# Patient Record
Sex: FEMALE | Race: BLACK OR AFRICAN AMERICAN | HISPANIC OR LATINO | Employment: OTHER | URBAN - METROPOLITAN AREA
[De-identification: names, ages, dates, MRNs, and addresses within clinical notes are randomized per-mention and may not be internally consistent; named-entity substitution may affect disease eponyms.]

---

## 2018-06-09 ENCOUNTER — HOSPITAL ENCOUNTER (EMERGENCY)
Facility: OTHER | Age: 43
Discharge: HOME | End: 2018-06-09
Attending: EMERGENCY MEDICINE
Payer: MEDICARE

## 2018-06-09 VITALS
BODY MASS INDEX: 38.89 KG/M2 | WEIGHT: 242 LBS | RESPIRATION RATE: 17 BRPM | TEMPERATURE: 98 F | DIASTOLIC BLOOD PRESSURE: 83 MMHG | HEIGHT: 66 IN | HEART RATE: 92 BPM | SYSTOLIC BLOOD PRESSURE: 151 MMHG | OXYGEN SATURATION: 97 %

## 2018-06-09 DIAGNOSIS — F41.9 ANXIETY: ICD-10-CM

## 2018-06-09 DIAGNOSIS — J45.901 EXACERBATION OF ASTHMA, UNSPECIFIED ASTHMA SEVERITY, UNSPECIFIED WHETHER PERSISTENT: Primary | ICD-10-CM

## 2018-06-09 DIAGNOSIS — R05.9 COUGH: ICD-10-CM

## 2018-06-09 PROCEDURE — 99284 EMERGENCY DEPT VISIT MOD MDM: CPT | Mod: 25

## 2018-06-09 PROCEDURE — 25000003 PHARM REV CODE 250: Performed by: EMERGENCY MEDICINE

## 2018-06-09 PROCEDURE — 25000242 PHARM REV CODE 250 ALT 637 W/ HCPCS: Performed by: EMERGENCY MEDICINE

## 2018-06-09 PROCEDURE — 63600175 PHARM REV CODE 636 W HCPCS: Performed by: EMERGENCY MEDICINE

## 2018-06-09 PROCEDURE — 94640 AIRWAY INHALATION TREATMENT: CPT

## 2018-06-09 PROCEDURE — 96372 THER/PROPH/DIAG INJ SC/IM: CPT

## 2018-06-09 RX ORDER — METHYLPREDNISOLONE SOD SUCC 125 MG
125 VIAL (EA) INJECTION
Status: DISCONTINUED | OUTPATIENT
Start: 2018-06-09 | End: 2018-06-09

## 2018-06-09 RX ORDER — IPRATROPIUM BROMIDE AND ALBUTEROL SULFATE 2.5; .5 MG/3ML; MG/3ML
3 SOLUTION RESPIRATORY (INHALATION)
Status: COMPLETED | OUTPATIENT
Start: 2018-06-09 | End: 2018-06-09

## 2018-06-09 RX ORDER — PREDNISONE 20 MG/1
40 TABLET ORAL DAILY
Qty: 10 TABLET | Refills: 0 | Status: SHIPPED | OUTPATIENT
Start: 2018-06-09 | End: 2018-06-14

## 2018-06-09 RX ORDER — LORAZEPAM 1 MG/1
1 TABLET ORAL
Status: COMPLETED | OUTPATIENT
Start: 2018-06-09 | End: 2018-06-09

## 2018-06-09 RX ORDER — METHYLPREDNISOLONE SOD SUCC 125 MG
125 VIAL (EA) INJECTION
Status: COMPLETED | OUTPATIENT
Start: 2018-06-09 | End: 2018-06-09

## 2018-06-09 RX ADMIN — IPRATROPIUM BROMIDE AND ALBUTEROL SULFATE 3 ML: .5; 3 SOLUTION RESPIRATORY (INHALATION) at 09:06

## 2018-06-09 RX ADMIN — METHYLPREDNISOLONE SODIUM SUCCINATE 125 MG: 125 INJECTION, POWDER, FOR SOLUTION INTRAMUSCULAR; INTRAVENOUS at 10:06

## 2018-06-09 RX ADMIN — LORAZEPAM 1 MG: 1 TABLET ORAL at 10:06

## 2018-06-10 NOTE — ED PROVIDER NOTES
"Encounter Date: 6/9/2018    SCRIBE #1 NOTE: I, Sandra Crawford, am scribing for, and in the presence of, Dr. Srinivasan.       History     Chief Complaint   Patient presents with    Asthma     Pt CO asthma attack Xs 3 days, worsening tonight.      Time seen by provider: 9:52 PM    This is a 42 y.o. Female, with history of asthma, HTN, and DM, who presents with complaint of shortness of breath that began three days ago. SOB worsened one hour ago. Pt reports productive cough and wheezing. She also reports "foaming from the mouth while trying to breath" prior to arrival. She reports using a nebulizer machine and inhaler with no improvement. Pt last used steroids approximately one year ago, and is compliant with medication. She denies prior intubation. She denies use of tobacco, but is exposed to second hand smoke. Pt denies fever, chills, congestion, rhinorrhea, nausea, vomiting, or leg swelling.      The history is provided by the patient.     Review of patient's allergies indicates:  No Known Allergies  Past Medical History:   Diagnosis Date    Anxiety     Diabetes mellitus     Hypertension      Past Surgical History:   Procedure Laterality Date    HYSTERECTOMY       No family history on file.  Social History   Substance Use Topics    Smoking status: Never Smoker    Smokeless tobacco: Not on file    Alcohol use Yes      Comment: social     Review of Systems   Constitutional: Negative for activity change, appetite change, chills, diaphoresis and fever.   HENT: Negative for congestion, rhinorrhea, sore throat and trouble swallowing.    Eyes: Negative for photophobia and visual disturbance.   Respiratory: Positive for cough, shortness of breath and wheezing. Negative for chest tightness.         Positive for dyspnea.    Cardiovascular: Negative for chest pain and leg swelling.   Gastrointestinal: Negative for abdominal pain, nausea and vomiting.   Endocrine: Negative for polydipsia and polyuria.   Genitourinary: " Negative for difficulty urinating and flank pain.   Musculoskeletal: Negative for back pain and neck pain.   Skin: Negative for rash.   Neurological: Negative for weakness and headaches.   Psychiatric/Behavioral: Negative for confusion.       Physical Exam     Initial Vitals [06/09/18 2146]   BP Pulse Resp Temp SpO2   (!) 155/102 98 (!) 28 97.9 °F (36.6 °C) 99 %      MAP       119.67         Physical Exam    Nursing note and vitals reviewed.  Constitutional: She appears well-developed and well-nourished. She is not diaphoretic.   Anxious appearing. Tearful. Obese.    HENT:   Head: Normocephalic and atraumatic.   Right Ear: External ear normal.   Left Ear: External ear normal.   Nose: Nose normal.   Mouth/Throat: Oropharynx is clear and moist.   Eyes: Conjunctivae and EOM are normal. Pupils are equal, round, and reactive to light. No scleral icterus.   Neck: Normal range of motion. Neck supple. No JVD present.   Cardiovascular: Normal rate, regular rhythm, normal heart sounds and intact distal pulses. Exam reveals no gallop and no friction rub.    No murmur heard.  Pulmonary/Chest: No stridor.   Speaking full sentences. Intermittent hyperventilation. Diminished air entry to bilateral bases.   Abdominal: Soft.   Musculoskeletal: Normal range of motion. She exhibits no edema or tenderness.   Neurological: She is alert and oriented to person, place, and time. She has normal strength. No cranial nerve deficit.   Skin: Skin is warm and dry. Capillary refill takes less than 2 seconds. No rash noted. No pallor.   Psychiatric: She has a normal mood and affect. Her behavior is normal. Judgment and thought content normal.         ED Course   Procedures  Labs Reviewed - No data to display       X-Ray Chest 1 View   Final Result      No acute findings in the chest.         Electronically signed by: Bashir Landa MD   Date:    06/09/2018   Time:    22:59        Imaging Results          X-Ray Chest 1 View (Final result)  Result  time 06/09/18 22:59:43    Final result by Bashir Landa MD (06/09/18 22:59:43)                 Impression:      No acute findings in the chest.      Electronically signed by: Bashir Landa MD  Date:    06/09/2018  Time:    22:59             Narrative:    EXAMINATION:  XR CHEST 1 VIEW    CLINICAL HISTORY:  Cough    TECHNIQUE:  Single frontal view of the chest was performed.    COMPARISON:  March 27, 2015.    FINDINGS:  No consolidation, pleural effusion or pneumothorax.    Cardiomediastinal silhouette is unremarkable.                                X-Rays:   Independently Interpreted Readings:   Chest X-Ray: Normal heart size.  No infiltrates.  No acute abnormalities.     Medical Decision Making:   Initial Assessment:   Urgent evaluation a 42-year-old female with asthma here 3 days of worsening shortness of breath, wheezing unimproved with home inhaler treatments, sensation of throat closing this afternoon.  Patient denies fevers, has a history of anxiety, on exam is able to speak full sentences, but intermittently hyperventilating, tearful.  Suspect anxiety component given lack of conversational dyspnea, w acute URI.  Will administer steroids, nebulizers, and reasses.  Clinical Tests:   Radiological Study: Ordered and Reviewed  ED Management:  Patient feeling improved, and calmed after medications, chest x-ray without infiltrate, vital signs without tachycardia or hypoxia.            Scribe Attestation:   Scribe #1: I performed the above scribed service and the documentation accurately describes the services I performed. I attest to the accuracy of the note.    Attending Attestation:           Physician Attestation for Scribe:  Physician Attestation Statement for Scribe #1: I, Dr. Srinivasan, reviewed documentation, as scribed by Sandra Crawford in my presence, and it is both accurate and complete.                    Clinical Impression:     1. Exacerbation of asthma, unspecified asthma severity, unspecified  whether persistent    2. Cough    3. Anxiety            Disposition:   Disposition: Discharged  Condition: Stable                        Odalys Srinivasan MD  06/10/18 0231       Odalys Srinivasan MD  06/10/18 0624

## 2018-06-10 NOTE — ED NOTES
Pt states that she has had a cough and been short of breath for 3 days. She has used her inhaler and nebulizer without relief.  LOC: Pt is awake alert and aware of environment, oriented X3 and speaking in complete sentences  Appearance: Pt is in no acute distress, Pt is well groomed and clean, pt is tearful unsure why  Skin: skin is warm and dry with normal turgor, mucus membranes are moist and pink, skin is intact with no bruising or breakdown  Muskuloskeletal: Pt moves all extremities well, there is no obvious swelling or deformities noted, pulses are intact.  Respiratory: Airway is open and patent, respirations are spontaneous and even, wheezes noted bilateraly, + cough, pt states productive   Cardiac: normal rate, no edema and cap refill is <3sec  Neuro: Pt follows commands easily and has no obvious deficits

## 2018-06-10 NOTE — ED NOTES
Pt laying in bed with HOB elevated, resting with eyes close, breathing unlabored. X-ray at bedside. Denies any discomfort at this time. Will continue to monitor.

## 2018-07-02 DIAGNOSIS — J30.1 ALLERGIC RHINITIS DUE TO POLLEN: Primary | ICD-10-CM

## 2018-07-02 DIAGNOSIS — J45.40 MODERATE PERSISTENT ASTHMA: ICD-10-CM

## 2018-07-02 DIAGNOSIS — R31.29 HEMATURIA, MICROSCOPIC: ICD-10-CM

## 2018-07-31 ENCOUNTER — OFFICE VISIT (OUTPATIENT)
Dept: PULMONOLOGY | Facility: CLINIC | Age: 43
End: 2018-07-31
Payer: MEDICARE

## 2018-07-31 ENCOUNTER — LAB VISIT (OUTPATIENT)
Dept: LAB | Facility: HOSPITAL | Age: 43
End: 2018-07-31
Attending: INTERNAL MEDICINE
Payer: MEDICARE

## 2018-07-31 ENCOUNTER — HOSPITAL ENCOUNTER (OUTPATIENT)
Dept: PULMONOLOGY | Facility: CLINIC | Age: 43
Discharge: HOME | End: 2018-07-31
Payer: MEDICARE

## 2018-07-31 VITALS
BODY MASS INDEX: 40.02 KG/M2 | OXYGEN SATURATION: 99 % | HEART RATE: 66 BPM | SYSTOLIC BLOOD PRESSURE: 136 MMHG | WEIGHT: 249 LBS | DIASTOLIC BLOOD PRESSURE: 88 MMHG | HEIGHT: 66 IN

## 2018-07-31 DIAGNOSIS — R06.02 SOB (SHORTNESS OF BREATH): Primary | ICD-10-CM

## 2018-07-31 DIAGNOSIS — R06.02 SOB (SHORTNESS OF BREATH): ICD-10-CM

## 2018-07-31 DIAGNOSIS — R31.29 HEMATURIA, MICROSCOPIC: ICD-10-CM

## 2018-07-31 DIAGNOSIS — R53.83 FATIGUE, UNSPECIFIED TYPE: ICD-10-CM

## 2018-07-31 DIAGNOSIS — D64.9 ANEMIA, UNSPECIFIED TYPE: ICD-10-CM

## 2018-07-31 DIAGNOSIS — R06.9 UNSPECIFIED ABNORMALITIES OF BREATHING: ICD-10-CM

## 2018-07-31 DIAGNOSIS — J45.40 MODERATE PERSISTENT ASTHMA: ICD-10-CM

## 2018-07-31 DIAGNOSIS — E66.2 MORBID (SEVERE) OBESITY WITH ALVEOLAR HYPOVENTILATION: ICD-10-CM

## 2018-07-31 DIAGNOSIS — J30.1 ALLERGIC RHINITIS DUE TO POLLEN: ICD-10-CM

## 2018-07-31 LAB
ALBUMIN SERPL BCP-MCNC: 3.5 G/DL
ALP SERPL-CCNC: 85 U/L
ALT SERPL W/O P-5'-P-CCNC: 13 U/L
ANION GAP SERPL CALC-SCNC: 8 MMOL/L
AST SERPL-CCNC: 14 U/L
BASOPHILS # BLD AUTO: 0.02 K/UL
BASOPHILS NFR BLD: 0.3 %
BILIRUB SERPL-MCNC: 0.3 MG/DL
BNP SERPL-MCNC: 19 PG/ML
BUN SERPL-MCNC: 7 MG/DL
CALCIUM SERPL-MCNC: 9.3 MG/DL
CHLORIDE SERPL-SCNC: 102 MMOL/L
CO2 SERPL-SCNC: 25 MMOL/L
CREAT SERPL-MCNC: 0.7 MG/DL
DIFFERENTIAL METHOD: ABNORMAL
EOSINOPHIL # BLD AUTO: 0.4 K/UL
EOSINOPHIL NFR BLD: 6 %
ERYTHROCYTE [DISTWIDTH] IN BLOOD BY AUTOMATED COUNT: 15.1 %
EST. GFR  (AFRICAN AMERICAN): >60 ML/MIN/1.73 M^2
EST. GFR  (NON AFRICAN AMERICAN): >60 ML/MIN/1.73 M^2
FERRITIN SERPL-MCNC: 24 NG/ML
GLUCOSE SERPL-MCNC: 139 MG/DL
HCT VFR BLD AUTO: 38.2 %
HGB BLD-MCNC: 11.3 G/DL
IMM GRANULOCYTES # BLD AUTO: 0.02 K/UL
IMM GRANULOCYTES NFR BLD AUTO: 0.3 %
IRON SERPL-MCNC: 81 UG/DL
LYMPHOCYTES # BLD AUTO: 1.9 K/UL
LYMPHOCYTES NFR BLD: 28.1 %
MCH RBC QN AUTO: 25.1 PG
MCHC RBC AUTO-ENTMCNC: 29.6 G/DL
MCV RBC AUTO: 85 FL
MONOCYTES # BLD AUTO: 0.5 K/UL
MONOCYTES NFR BLD: 7.2 %
NEUTROPHILS # BLD AUTO: 3.9 K/UL
NEUTROPHILS NFR BLD: 58.1 %
NRBC BLD-RTO: 0 /100 WBC
PLATELET # BLD AUTO: 244 K/UL
PMV BLD AUTO: 12.5 FL
POST FEV1 FVC: 0.9
POST FEV1: 2.22
POST FVC: 2.47
POTASSIUM SERPL-SCNC: 3.6 MMOL/L
PRE FEV1 FVC: 88
PRE FEV1: 2.31
PRE FVC: 2.64
PREDICTED FEV1 FVC: 82
PREDICTED FEV1: 3.03
PREDICTED FVC: 3.67
PROT SERPL-MCNC: 7.2 G/DL
RBC # BLD AUTO: 4.51 M/UL
SATURATED IRON: 17 %
SODIUM SERPL-SCNC: 135 MMOL/L
T4 FREE SERPL-MCNC: 0.99 NG/DL
TOTAL IRON BINDING CAPACITY: 469 UG/DL
TRANSFERRIN SERPL-MCNC: 317 MG/DL
TSH SERPL DL<=0.005 MIU/L-ACNC: 1.16 UIU/ML
WBC # BLD AUTO: 6.63 K/UL

## 2018-07-31 PROCEDURE — 84443 ASSAY THYROID STIM HORMONE: CPT

## 2018-07-31 PROCEDURE — 94729 DIFFUSING CAPACITY: CPT | Mod: PBBFAC | Performed by: INTERNAL MEDICINE

## 2018-07-31 PROCEDURE — 94060 EVALUATION OF WHEEZING: CPT | Mod: 26,S$PBB,, | Performed by: INTERNAL MEDICINE

## 2018-07-31 PROCEDURE — 94060 EVALUATION OF WHEEZING: CPT | Mod: PBBFAC | Performed by: INTERNAL MEDICINE

## 2018-07-31 PROCEDURE — 83880 ASSAY OF NATRIURETIC PEPTIDE: CPT

## 2018-07-31 PROCEDURE — 84439 ASSAY OF FREE THYROXINE: CPT

## 2018-07-31 PROCEDURE — 94729 DIFFUSING CAPACITY: CPT | Mod: 26,S$PBB,, | Performed by: INTERNAL MEDICINE

## 2018-07-31 PROCEDURE — 99999 PR PBB SHADOW E&M-EST. PATIENT-LVL III: CPT | Mod: PBBFAC,,, | Performed by: INTERNAL MEDICINE

## 2018-07-31 PROCEDURE — 94727 GAS DIL/WSHOT DETER LNG VOL: CPT | Mod: PBBFAC | Performed by: INTERNAL MEDICINE

## 2018-07-31 PROCEDURE — 99213 OFFICE O/P EST LOW 20 MIN: CPT | Mod: PBBFAC,25 | Performed by: INTERNAL MEDICINE

## 2018-07-31 PROCEDURE — 85025 COMPLETE CBC W/AUTO DIFF WBC: CPT

## 2018-07-31 PROCEDURE — 94727 GAS DIL/WSHOT DETER LNG VOL: CPT | Mod: 26,S$PBB,, | Performed by: INTERNAL MEDICINE

## 2018-07-31 PROCEDURE — 83540 ASSAY OF IRON: CPT

## 2018-07-31 PROCEDURE — 36415 COLL VENOUS BLD VENIPUNCTURE: CPT

## 2018-07-31 PROCEDURE — 82728 ASSAY OF FERRITIN: CPT

## 2018-07-31 PROCEDURE — 80053 COMPREHEN METABOLIC PANEL: CPT

## 2018-07-31 PROCEDURE — 99204 OFFICE O/P NEW MOD 45 MIN: CPT | Mod: 25,S$PBB,, | Performed by: INTERNAL MEDICINE

## 2018-07-31 RX ORDER — HYDROCODONE POLISTIREX AND CHLORPHENIRAMINE POLISTIREX 10; 8 MG/5ML; MG/5ML
SUSPENSION, EXTENDED RELEASE ORAL
Refills: 0 | COMMUNITY
Start: 2018-06-21 | End: 2019-04-25

## 2018-07-31 RX ORDER — FLUTICASONE PROPIONATE 50 MCG
SPRAY, SUSPENSION (ML) NASAL
Refills: 0 | COMMUNITY
Start: 2018-06-21 | End: 2020-03-24

## 2018-07-31 NOTE — PROGRESS NOTES
Subjective:       Patient ID: Juan Carlos Turner is a 42 y.o. female.    Chief Complaint: Asthma and Shortness of Breath    HPI   Juan Carlos Turner 42 y.o. female    has a past medical history of Anxiety; Diabetes mellitus; and Hypertension.    has a past surgical history that includes Hysterectomy.   reports that she has never smoked. She does not have any smokeless tobacco history on file. She reports that she drinks alcohol. She reports that she does not use drugs.  Referred by: Jacquelin Alva  Who had concerns including Asthma and Shortness of Breath.  The patient's last visit with me was on Visit date not found.    Rushed to er, for sob, unable to lie flat- seen at [facility] and [facility]  Given dx of asthma    Taking Breo daily no improvement with albuterol or felisha  Using ventolin as needed- not every day  Issues for over a year  Went to [facility]- dx with asthma  Can walk 2 blocks  And gets sob, getting worse  No fever chills, wt changes, nausea, vomiting, diarrhea, constipation, chest pain, tightness, pressure  No prior medical problems, recent arthritis,   and sob   HA+, NS, occ fever    Review of Systems   All other systems reviewed and are negative.      Objective:      Physical Exam   Constitutional: She is oriented to person, place, and time. She appears well-developed and well-nourished. She appears not cachectic. No distress. She is obese.   HENT:   Head: Normocephalic.   Nose: Nose normal. No mucosal edema.   Mouth/Throat: Normal dentition. No oropharyngeal exudate.   Neck: Normal range of motion. Neck supple. No tracheal deviation present.   Cardiovascular: Normal rate, regular rhythm, normal heart sounds and intact distal pulses.  Exam reveals no gallop and no friction rub.    No murmur heard.  Pulmonary/Chest: Normal expansion, symmetric chest wall expansion, effort normal and breath sounds normal. No stridor.   Abdominal: Soft. Bowel sounds are normal.   Musculoskeletal: Normal range of motion.  She exhibits no edema, tenderness or deformity.   Lymphadenopathy: No supraclavicular adenopathy is present.     She has no cervical adenopathy.   Neurological: She is alert and oriented to person, place, and time. No cranial nerve deficit. Gait normal.   Skin: Skin is warm and dry. No rash noted. She is not diaphoretic. No cyanosis or erythema. No pallor. Nails show no clubbing.   Psychiatric: She has a normal mood and affect. Her behavior is normal. Judgment and thought content normal.     Personal Diagnostic Review    No flowsheet data found.      Assessment:       1. SOB (shortness of breath)    2. Unspecified abnormalities of breathing    3. Fatigue, unspecified type    4. Anemia, unspecified type    5. Morbid (severe) obesity with alveolar hypoventilation         Outpatient Encounter Prescriptions as of 7/31/2018   Medication Sig Dispense Refill    amlodipine (NORVASC) 10 MG tablet Take 10 mg by mouth once daily.      escitalopram oxalate (LEXAPRO) 20 MG tablet Take 20 mg by mouth once daily.      fluticasone (FLONASE) 50 mcg/actuation nasal spray SPRAY ONCE IEN ONCE D  0    hydrocodone-chlorpheniramine (TUSSIONEX) 10-8 mg/5 mL suspension TK 10 ML PO Q 12 H PRN FOR COUGH FOR UP TO 10 DAYS  0    metformin (GLUMETZA) 500 MG (MOD) 24 hr tablet Take 1,000 mg by mouth 2 (two) times daily with meals.       [DISCONTINUED] naproxen (NAPROSYN) 500 MG tablet Take 1 tablet (500 mg total) by mouth 2 (two) times daily with meals. 20 tablet 0     No facility-administered encounter medications on file as of 7/31/2018.      Orders Placed This Encounter   Procedures    Home Sleep Studies     Standing Status:   Future     Standing Expiration Date:   7/31/2019    CT Chest Without Contrast     Standing Status:   Future     Standing Expiration Date:   7/31/2019    CBC auto differential     Standing Status:   Future     Number of Occurrences:   1     Standing Expiration Date:   7/31/2019    Comprehensive metabolic panel      Standing Status:   Future     Number of Occurrences:   1     Standing Expiration Date:   7/31/2019    Brain natriuretic peptide     Standing Status:   Future     Number of Occurrences:   1     Standing Expiration Date:   7/31/2019    TSH     Standing Status:   Future     Number of Occurrences:   1     Standing Expiration Date:   9/29/2019    T4, free     Standing Status:   Future     Number of Occurrences:   1     Standing Expiration Date:   9/29/2019    Iron and TIBC     Standing Status:   Future     Number of Occurrences:   1     Standing Expiration Date:   9/29/2019    Ferritin     Standing Status:   Future     Number of Occurrences:   1     Standing Expiration Date:   9/29/2019    2D echo with color flow doppler     Standing Status:   Future     Standing Expiration Date:   7/31/2019    Stress test, pulmonary     Standing Status:   Future     Standing Expiration Date:   7/31/2019     Plan:           I personally reviewed the      1. Pfts wnl, nl spirometry, nl dlco, tlc decreased at 50%    Assessment:  Juan Carlos was seen today for asthma and shortness of breath.    Diagnoses and all orders for this visit:    SOB (shortness of breath)  -     CT Chest Without Contrast; Future  -     2D echo with color flow doppler; Future  -     CBC auto differential; Future  -     Comprehensive metabolic panel; Future  -     Brain natriuretic peptide; Future  -     TSH; Future  -     T4, free; Future  -     Iron and TIBC; Future  -     Ferritin; Future  -     Stress test, pulmonary; Future  -     Home Sleep Studies; Future    Unspecified abnormalities of breathing  -     CT Chest Without Contrast; Future  -     2D echo with color flow doppler; Future  -     CBC auto differential; Future  -     Comprehensive metabolic panel; Future  -     Brain natriuretic peptide; Future  -     TSH; Future  -     T4, free; Future  -     Iron and TIBC; Future  -     Ferritin; Future  -     Stress test, pulmonary; Future  -     Home Sleep  Studies; Future    Fatigue, unspecified type  -     CT Chest Without Contrast; Future  -     2D echo with color flow doppler; Future  -     CBC auto differential; Future  -     Comprehensive metabolic panel; Future  -     Brain natriuretic peptide; Future  -     TSH; Future  -     T4, free; Future  -     Iron and TIBC; Future  -     Ferritin; Future  -     Stress test, pulmonary; Future  -     Home Sleep Studies; Future    Anemia, unspecified type  -     CT Chest Without Contrast; Future  -     2D echo with color flow doppler; Future  -     CBC auto differential; Future  -     Comprehensive metabolic panel; Future  -     Brain natriuretic peptide; Future  -     TSH; Future  -     T4, free; Future  -     Iron and TIBC; Future  -     Ferritin; Future  -     Stress test, pulmonary; Future  -     Home Sleep Studies; Future    Morbid (severe) obesity with alveolar hypoventilation   -     Home Sleep Studies; Future        Plan:  Initiate workup   Return in 4 weeks    Follow-up in about 4 weeks (around 8/28/2018).    There are no Patient Instructions on file for this visit.      There is no immunization history on file for this patient.

## 2018-07-31 NOTE — LETTER
August 2, 2018      Jacquelin Alva NP  [address] [facility] [place] [address]           [facility]  [address]  [place] [address]  Phone: [phone]          Patient: Juan Carlos Turner   MR Number: [id number]   YOB: 1975   Date of Visit: 7/31/2018       Dear Jacquelin Alva:    Thank you for referring Juan Carlos Turner to me for evaluation. Attached you will find relevant portions of my assessment and plan of care.    If you have questions, please do not hesitate to call me. I look forward to following Juan Carlos Turner along with you.    Sincerely,    Chandler Baeza MD    Enclosure  CC:  No Recipients    If you would like to receive this communication electronically, please contact [email or web address] or [phone] to request more information on [facility] [employer] access.    For providers and/or their staff who would like to refer a patient to [facility], please contact us through our one-stop-shop provider referral line, [facility], at [phone].    If you feel you have received this communication in error or would no longer like to receive these types of communications, please e-mail [email or web address]

## 2018-08-03 ENCOUNTER — TELEPHONE (OUTPATIENT)
Dept: PULMONOLOGY | Facility: CLINIC | Age: 43
End: 2018-08-03

## 2018-08-03 NOTE — TELEPHONE ENCOUNTER
I left the pt a voicemail to let her know that she is slightly anemic and Dr Baeza would like for her to start OTC iron- ferrous sulfate 325 tid per Dr Baeza. Also, I left the office number, if the pt has any question's.

## 2018-08-07 ENCOUNTER — HOSPITAL ENCOUNTER (OUTPATIENT)
Dept: PULMONOLOGY | Facility: CLINIC | Age: 43
Discharge: HOME | End: 2018-08-07
Payer: MEDICARE

## 2018-08-07 ENCOUNTER — HOSPITAL ENCOUNTER (OUTPATIENT)
Dept: RADIOLOGY | Facility: HOSPITAL | Age: 43
Discharge: HOME | End: 2018-08-07
Attending: INTERNAL MEDICINE
Payer: MEDICARE

## 2018-08-07 ENCOUNTER — HOSPITAL ENCOUNTER (OUTPATIENT)
Dept: CARDIOLOGY | Facility: CLINIC | Age: 43
Discharge: HOME | End: 2018-08-07
Attending: INTERNAL MEDICINE
Payer: MEDICARE

## 2018-08-07 VITALS — HEIGHT: 66 IN | BODY MASS INDEX: 40.02 KG/M2 | WEIGHT: 249 LBS

## 2018-08-07 DIAGNOSIS — D64.9 ANEMIA, UNSPECIFIED TYPE: ICD-10-CM

## 2018-08-07 DIAGNOSIS — R06.9 UNSPECIFIED ABNORMALITIES OF BREATHING: ICD-10-CM

## 2018-08-07 DIAGNOSIS — R06.02 SOB (SHORTNESS OF BREATH): ICD-10-CM

## 2018-08-07 DIAGNOSIS — R53.83 FATIGUE, UNSPECIFIED TYPE: ICD-10-CM

## 2018-08-07 LAB
AORTIC VALVE REGURGITATION: NORMAL
DIASTOLIC DYSFUNCTION: NO
ESTIMATED PA SYSTOLIC PRESSURE: 26.04
MITRAL VALVE REGURGITATION: NORMAL
RETIRED EF AND QEF - SEE NOTES: 65 (ref 55–65)
TRICUSPID VALVE REGURGITATION: NORMAL

## 2018-08-07 PROCEDURE — 94618 PULMONARY STRESS TESTING: CPT | Mod: 26,S$PBB,, | Performed by: INTERNAL MEDICINE

## 2018-08-07 PROCEDURE — 71250 CT THORAX DX C-: CPT | Mod: TC

## 2018-08-07 PROCEDURE — 71250 CT THORAX DX C-: CPT | Mod: 26,,, | Performed by: RADIOLOGY

## 2018-08-07 PROCEDURE — 93306 TTE W/DOPPLER COMPLETE: CPT | Mod: PBBFAC | Performed by: INTERNAL MEDICINE

## 2018-08-07 PROCEDURE — 94618 PULMONARY STRESS TESTING: CPT | Mod: PBBFAC | Performed by: INTERNAL MEDICINE

## 2018-08-08 NOTE — PROCEDURES
Juan Carlos Turner is a 42 y.o.  female patient, who presents for a 6 minute walk test ordered by Aryan GALLOWAY.  The diagnosis is Shortness of Breath.  The patient's BMI is 40.3 kg/m2.  Predicted distance (lower limit of normal) is 387.5 meters.      Test Results:    The test was completed without stopping.    The total time walked was 360 seconds.  During walking, the patient reported:  Chest pain, Dizziness, Dyspnea, Leg pain. The patient used no assistive devices  during testing.     08/07/2018---------Distance: 389.23 meters (1277 feet)     O2 Sat % Supplemental Oxygen Heart Rate Blood Pressure Yajaira Scale   Pre-exercise  (Resting) 99 % Room Air 84 bpm 126/84 mmHg 0.5   During Exercise 99 % Room Air 110 bpm 128/81 mmHg 5-6   Post-exercise  (Recovery) 99 % Room Air  90 bpm   mmHg       Recovery Time: 68 seconds    Performing nurse/tech: Polo RM      PREVIOUS STUDY:   The patient has not had a previous study.      CLINICAL INTERPRETATION:  Six minute walk distance is 389.23 meters (1277 feet) with heavy dyspnea.  During exercise, there was no significant desaturation while breathing room air.  Blood pressure remained stable and Heart rate increased significantly with walking.  This may represent a tachycardic response to exercise.  The patient reported non-pulmonary symptoms during exercise.  No previous study performed.  Based upon age and body mass index, exercise capacity is normal.

## 2018-08-10 ENCOUNTER — TELEPHONE (OUTPATIENT)
Dept: PULMONOLOGY | Facility: CLINIC | Age: 43
End: 2018-08-10

## 2018-08-10 NOTE — TELEPHONE ENCOUNTER
I spoke to the patient to let her know that echo was normal per Dr Baeza. Pt verbalized understanding.    Pt would like results of ct scan and 6min walk test.

## 2018-08-16 ENCOUNTER — TELEPHONE (OUTPATIENT)
Dept: PULMONOLOGY | Facility: CLINIC | Age: 43
End: 2018-08-16

## 2018-08-16 NOTE — TELEPHONE ENCOUNTER
----- Message from Aftab Arriaza sent at 8/16/2018  2:20 PM CDT -----  Contact: Pt  PT is calling for test results    Pt can be reached at [phone].    Thanks

## 2018-08-20 ENCOUNTER — TELEPHONE (OUTPATIENT)
Dept: PULMONOLOGY | Facility: CLINIC | Age: 43
End: 2018-08-20

## 2018-08-20 NOTE — TELEPHONE ENCOUNTER
I left the pt a voicemail to let her know that both 6mwd was normal. Ct chest- lungs were normal, but the liver showed some congestion that is common but she should followup with her pcp or a liver doctor in the future per Dr Baeza.

## 2018-08-28 ENCOUNTER — TELEPHONE (OUTPATIENT)
Dept: SLEEP MEDICINE | Facility: OTHER | Age: 43
End: 2018-08-28

## 2018-09-10 ENCOUNTER — TELEPHONE (OUTPATIENT)
Dept: SLEEP MEDICINE | Facility: OTHER | Age: 43
End: 2018-09-10

## 2018-09-11 ENCOUNTER — HOSPITAL ENCOUNTER (OUTPATIENT)
Dept: SLEEP MEDICINE | Facility: OTHER | Age: 43
Discharge: HOME | End: 2018-09-11
Attending: INTERNAL MEDICINE
Payer: MEDICARE

## 2018-09-11 DIAGNOSIS — D64.9 ANEMIA, UNSPECIFIED TYPE: ICD-10-CM

## 2018-09-11 DIAGNOSIS — R06.02 SOB (SHORTNESS OF BREATH): ICD-10-CM

## 2018-09-11 DIAGNOSIS — R06.9 UNSPECIFIED ABNORMALITIES OF BREATHING: ICD-10-CM

## 2018-09-11 DIAGNOSIS — E66.2 MORBID (SEVERE) OBESITY WITH ALVEOLAR HYPOVENTILATION: ICD-10-CM

## 2018-09-11 DIAGNOSIS — R53.83 FATIGUE, UNSPECIFIED TYPE: ICD-10-CM

## 2018-09-11 DIAGNOSIS — G47.33 OSA (OBSTRUCTIVE SLEEP APNEA): ICD-10-CM

## 2018-09-11 PROCEDURE — 95800 SLP STDY UNATTENDED: CPT | Mod: 26,,, | Performed by: PSYCHIATRY & NEUROLOGY

## 2018-09-11 PROCEDURE — 95800 SLP STDY UNATTENDED: CPT

## 2018-09-20 ENCOUNTER — OFFICE VISIT (OUTPATIENT)
Dept: SPINE | Facility: CLINIC | Age: 43
End: 2018-09-20
Attending: ANESTHESIOLOGY
Payer: MEDICARE

## 2018-09-20 VITALS
SYSTOLIC BLOOD PRESSURE: 130 MMHG | HEIGHT: 66 IN | HEART RATE: 83 BPM | BODY MASS INDEX: 40.02 KG/M2 | DIASTOLIC BLOOD PRESSURE: 78 MMHG | TEMPERATURE: 99 F | WEIGHT: 249 LBS

## 2018-09-20 DIAGNOSIS — M51.16 LUMBAR DISC HERNIATION WITH RADICULOPATHY: ICD-10-CM

## 2018-09-20 PROCEDURE — 99214 OFFICE O/P EST MOD 30 MIN: CPT | Mod: PBBFAC | Performed by: ANESTHESIOLOGY

## 2018-09-20 PROCEDURE — 99999 PR PBB SHADOW E&M-EST. PATIENT-LVL IV: CPT | Mod: PBBFAC,,, | Performed by: ANESTHESIOLOGY

## 2018-09-20 PROCEDURE — 99204 OFFICE O/P NEW MOD 45 MIN: CPT | Mod: S$PBB,,, | Performed by: ANESTHESIOLOGY

## 2018-09-20 RX ORDER — CYCLOBENZAPRINE HCL 10 MG
10 TABLET ORAL 3 TIMES DAILY PRN
Qty: 30 TABLET | Refills: 0 | Status: SHIPPED | OUTPATIENT
Start: 2018-09-20 | End: 2018-09-30

## 2018-09-20 RX ORDER — SERTRALINE HYDROCHLORIDE 100 MG/1
TABLET, FILM COATED ORAL
COMMUNITY
Start: 2018-09-11 | End: 2019-05-08

## 2018-09-20 RX ORDER — METHYLPREDNISOLONE 4 MG/1
TABLET ORAL
Qty: 1 PACKAGE | Refills: 0 | Status: SHIPPED | OUTPATIENT
Start: 2018-09-20 | End: 2018-10-11

## 2018-09-20 RX ORDER — ATORVASTATIN CALCIUM 80 MG/1
80 TABLET, FILM COATED ORAL DAILY
COMMUNITY
Start: 2018-06-15 | End: 2020-03-10

## 2018-09-20 RX ORDER — ALBUTEROL SULFATE 0.83 MG/ML
2.5 SOLUTION RESPIRATORY (INHALATION)
Refills: 3 | COMMUNITY
Start: 2018-08-15

## 2018-09-20 RX ORDER — ALBUTEROL SULFATE 90 UG/1
1-2 AEROSOL, METERED RESPIRATORY (INHALATION)
COMMUNITY
Start: 2018-09-11

## 2018-09-20 NOTE — LETTER
September 21, 2018      Jacquelin Alva NP  [address] [facility] [place] [address]           [facility]  [address], [address]  [facility] [address]  Phone: [phone]  Fax: [phone]          Patient: Juan Carlos [facility]ner   MR Number: [id number]   YOB: 1975   Date of Visit: 9/20/2018       Dear Jacquelin Alva:    Thank you for referring Juan Carlos Turner to me for evaluation. Attached you will find relevant portions of my assessment and plan of care.    If you have questions, please do not hesitate to call me. I look forward to following Juan Carlos Turner along with you.    Sincerely,    Ed Pina MD    Enclosure  CC:  No Recipients    If you would like to receive this communication electronically, please contact [email or web address] or [phone] to request more information on [facility] access.    For providers and/or their staff who would like to refer a patient to [facility], please contact us through our one-stop-shop provider referral line, [facility], at [phone].    If you feel you have received this communication in error or would no longer like to receive these types of communications, please e-mail [email or web address]

## 2018-09-20 NOTE — PROGRESS NOTES
Chronic Pain - New Consult    Referring Physician: Jacquelin Alva NP    Chief Complaint:   Chief Complaint   Patient presents with    Low-back Pain        SUBJECTIVE: Disclaimer: This note has been generated using voice-recognition software. There may be typographical errors that have been missed during proof-reading    Initial encounter:    Juan Carlos Turner presents to the clinic for the evaluation of lower back pain. The pain started 3 years ago and symptoms have been worsening.    Brief history:  Previous MRI 1 year ago, stated to have disk herniations, but imaging not brought with the patient today    Pain Description:    The pain is located in the lower back area and radiates to the legs and buttock.      At BEST  10/10     At WORST  10/10 on the WORST day.      On average pain is rated as 10/10.     Today the pain is rated as 10/10    The pain is described as shooting, throbbing and tight band      Symptoms interfere with daily activity, sleeping and work.     Exacerbating factors: Sitting, Standing, Laying, Walking, Night Time, Morning, Lifting and Getting out of bed/chair.      Mitigating factors medications and physical therapy.     Patient denies night fever/night sweats, urinary incontinence, bowel incontinence, significant weight loss, significant motor weakness and loss of sensations.  Patient denies any suicidal or homicidal ideations    Pain Medications:  Current:  Tylenol      Tried in Past:  NSAIDs -Aspirin  TCA -Never  SNRI -Never  Anti-convulsants -Never  Muscle Relaxants -Never  Opioids-tramadol    Physical Therapy/Home Exercise: yes       report:  Reviewed and consistent with medication use as prescribed.    Pain Procedures: No    Chiropractor -never  Acupuncture - never  TENS unit -never  Spinal decompression -never  Joint replacement -never, previous right knee arthroscopy    Imaging:  None available for review.      Past Medical History:   Diagnosis Date    Anxiety     Diabetes  mellitus     Hypertension      Past Surgical History:   Procedure Laterality Date    HYSTERECTOMY       Social History     Socioeconomic History    Marital status: Single     Spouse name: Not on file    Number of children: Not on file    Years of education: Not on file    Highest education level: Not on file   Social Needs    Financial resource strain: Not on file    Food insecurity - worry: Not on file    Food insecurity - inability: Not on file    Transportation needs - medical: Not on file    Transportation needs - non-medical: Not on file   Occupational History    Not on file   Tobacco Use    Smoking status: Never Smoker   Substance and Sexual Activity    Alcohol use: Yes     Comment: social    Drug use: No    Sexual activity: Not on file   Other Topics Concern    Not on file   Social History Narrative    Not on file     History reviewed. No pertinent family history.    Review of patient's allergies indicates:  No Known Allergies    Current Outpatient Medications   Medication Sig    albuterol (PROVENTIL) 2.5 mg /3 mL (0.083 %) nebulizer solution     amlodipine (NORVASC) 10 MG tablet Take 10 mg by mouth once daily.    atorvastatin (LIPITOR) 80 MG tablet     escitalopram oxalate (LEXAPRO) 20 MG tablet Take 20 mg by mouth once daily.    fluticasone (FLONASE) 50 mcg/actuation nasal spray SPRAY ONCE IEN ONCE D    hydrocodone-chlorpheniramine (TUSSIONEX) 10-8 mg/5 mL suspension TK 10 ML PO Q 12 H PRN FOR COUGH FOR UP TO 10 DAYS    metformin (GLUMETZA) 500 MG (MOD) 24 hr tablet Take 1,000 mg by mouth 2 (two) times daily with meals.     ONETOUCH DELICA LANCETS 33 gauge Misc TEST ONCE D    ONETOUCH ULTRA BLUE TEST STRIP Strp TEST ONCE D BEFORE BREAKFAST    ONETOUCH ULTRAMINI kit UTD    sertraline (ZOLOFT) 100 MG tablet     VENTOLIN HFA 90 mcg/actuation inhaler      No current facility-administered medications for this visit.        REVIEW OF SYSTEMS:    GENERAL:  No weight loss, malaise or  "fevers.  HEENT:   No recent changes in vision or hearing  NECK:  Negative for lumps, no difficulty with swallowing.  RESPIRATORY:  Negative for cough, wheezing or shortness of breath, patient denies any recent URI.  CARDIOVASCULAR:  Negative for chest pain, leg swelling or palpitations.  GI:  Negative for abdominal discomfort, blood in stools or black stools or change in bowel habits.  MUSCULOSKELETAL:  See HPI.  SKIN:  Negative for lesions, rash, and itching.  PSYCH:  No mood disorder or recent psychosocial stressors.  Patients sleep is  disturbed secondary to pain.  HEMATOLOGY/LYMPHOLOGY:  Negative for prolonged bleeding, bruising easily or swollen nodes.  Patient is not currently taking any anti-coagulants  ENDO: DM2  NEURO:   No history of headaches, syncope, paralysis, seizures or tremors.  All other reviewed and negative other than HPI.    OBJECTIVE:    /78   Pulse 83   Temp 98.6 °F (37 °C)   Ht 5' 6" (1.676 m)   Wt 112.9 kg (249 lb)   BMI 40.19 kg/m²     PHYSICAL EXAMINATION:    GENERAL: Well appearing, in no acute distress, alert and oriented x3.  PSYCH:  Mood and affect appropriate.  SKIN: Skin color, texture, turgor normal, no rashes or lesions. Wearing right knee brace  HEAD/FACE:  Normocephalic, atraumatic. Cranial nerves grossly intact.  CV: RRR with palpation of the radial artery.  PULM: No evidence of respiratory difficulty, symmetric chest rise.  BACK: Patient acutely uncomfortable, deferred straight leg raising. There is pain with palpation over the facet joints of the lumbar spine bilaterally. There is decreased range of motion with extension to 15 degrees, and facet loading maneuvers cause reproducible pain.    EXTREMITIES: Peripheral joint ROM is full and pain free without obvious instability or laxity in all four extremities. No deformities, edema, or skin discoloration. Good capillary refill.  MUSCULOSKELETAL: Hip, and knee provocative maneuvers are negative.  There is  pain with " palpation over the sacroiliac joints bilaterally.  There is no pain to palpation over the greater trochanteric bursa bilaterally.   Strength examination not performed secondary to acute discomfort.  No atrophy or tone abnormalities are noted.  NEURO: Bilateral lower extremity coordination and muscle stretch reflexes are physiologic and symmetric.  Plantar response are downgoing. No clonus.  No loss of sensation is noted.  GAIT: Antalgic, ambulates without assistance     ASSESSMENT: 42 y.o. year old female with pain, consistent with     Encounter Diagnosis   Name Primary?    Lumbar disc herniation with radiculopathy        PLAN:   Medrol dose andreas    Flexeril trial    Flex/ext xray lumbar spine, MRI    F/u in 1 week to review imaging and response to medrol dose andreas    In the future patient may be a candidate for epidural steroid injection vs PT.    Ed Pina 09/21/2018     The above plan and management options were discussed at length with patient. Patient is in agreement with the above and verbalized understanding. It will be communicated with the referring physician via electronic record, fax, or mail.

## 2018-09-21 ENCOUNTER — TELEPHONE (OUTPATIENT)
Dept: CRITICAL CARE MEDICINE | Facility: HOSPITAL | Age: 43
End: 2018-09-21

## 2018-09-21 DIAGNOSIS — G47.33 OSA (OBSTRUCTIVE SLEEP APNEA): Primary | ICD-10-CM

## 2018-09-21 NOTE — TELEPHONE ENCOUNTER
Please let patient know that sleep study showed sleep apnea and we are referring her to a sleep specialist.   Sincerely,  Suni Baeza

## 2018-09-26 ENCOUNTER — HOSPITAL ENCOUNTER (OUTPATIENT)
Dept: RADIOLOGY | Facility: OTHER | Age: 43
Discharge: HOME | End: 2018-09-26
Attending: ANESTHESIOLOGY
Payer: MEDICARE

## 2018-09-26 ENCOUNTER — TELEPHONE (OUTPATIENT)
Dept: PULMONOLOGY | Facility: CLINIC | Age: 43
End: 2018-09-26

## 2018-09-26 DIAGNOSIS — M51.16 LUMBAR DISC HERNIATION WITH RADICULOPATHY: ICD-10-CM

## 2018-09-26 PROCEDURE — 72148 MRI LUMBAR SPINE W/O DYE: CPT | Mod: 26,,, | Performed by: RADIOLOGY

## 2018-09-26 PROCEDURE — 72110 X-RAY EXAM L-2 SPINE 4/>VWS: CPT | Mod: TC,FY

## 2018-09-26 PROCEDURE — 72148 MRI LUMBAR SPINE W/O DYE: CPT | Mod: TC

## 2018-09-26 PROCEDURE — 72110 X-RAY EXAM L-2 SPINE 4/>VWS: CPT | Mod: 26,,, | Performed by: RADIOLOGY

## 2018-09-26 NOTE — TELEPHONE ENCOUNTER
I spoke to the pt to let her know that sleep study showed sleep apnea and we are referring her to a sleep specialist per Dr Baeza. Pt verbalized understanding and pt scheduled for 10/22/18 at 11:20am at [facility], [place], operated by [facility] with Dr Guerra.

## 2018-10-03 ENCOUNTER — OFFICE VISIT (OUTPATIENT)
Dept: PAIN MEDICINE | Facility: CLINIC | Age: 43
End: 2018-10-03
Payer: MEDICARE

## 2018-10-03 VITALS
HEIGHT: 66 IN | TEMPERATURE: 98 F | WEIGHT: 248.25 LBS | SYSTOLIC BLOOD PRESSURE: 136 MMHG | DIASTOLIC BLOOD PRESSURE: 88 MMHG | BODY MASS INDEX: 39.9 KG/M2 | HEART RATE: 84 BPM

## 2018-10-03 DIAGNOSIS — M54.16 LUMBAR RADICULOPATHY: Primary | ICD-10-CM

## 2018-10-03 DIAGNOSIS — M54.17 LUMBOSACRAL RADICULOPATHY: Primary | ICD-10-CM

## 2018-10-03 DIAGNOSIS — M48.061 SPINAL STENOSIS OF LUMBAR REGION, UNSPECIFIED WHETHER NEUROGENIC CLAUDICATION PRESENT: ICD-10-CM

## 2018-10-03 DIAGNOSIS — M47.816 LUMBAR SPONDYLOSIS: ICD-10-CM

## 2018-10-03 PROCEDURE — 99213 OFFICE O/P EST LOW 20 MIN: CPT | Mod: S$PBB,,, | Performed by: NURSE PRACTITIONER

## 2018-10-03 PROCEDURE — 99213 OFFICE O/P EST LOW 20 MIN: CPT | Mod: PBBFAC | Performed by: NURSE PRACTITIONER

## 2018-10-03 PROCEDURE — 99999 PR PBB SHADOW E&M-EST. PATIENT-LVL III: CPT | Mod: PBBFAC,,, | Performed by: NURSE PRACTITIONER

## 2018-10-03 NOTE — PROGRESS NOTES
Chronic Pain - New Consult    Referring Physician: No ref. provider found    Chief Complaint:   Chief Complaint   Patient presents with    Follow-up     1 week        SUBJECTIVE: Disclaimer: This note has been generated using voice-recognition software. There may be typographical errors that have been missed during proof-reading    Interval History 10/3/2018:  The patient returns for follow up and imaging review.  She continues to report pain across the lower back.  She reports radiation down there posterolateral aspect of both legs, right greater than left with accompanied numbness.  She also reports pain and numbness to both upper extremities.  She denies any significant weakness.  She reports no relief with recent steroid pack.  She has been taking [place] from [facility] and requests a refill of this.  Her recent MRI shows disc bulges with mild NF narrowing.  Her pain today is 10/10.  The patient denies any bowel or bladder incontinence or signs of saddle paresthesia.  The patient denies any major medical changes since last office visit.    Initial encounter:    Juan Carlos Turner presents to the clinic for the evaluation of lower back pain. The pain started 3 years ago and symptoms have been worsening.    Brief history:  Previous MRI 1 year ago, stated to have disk herniations, but imaging not brought with the patient today    Pain Description:    The pain is located in the lower back area and radiates to the legs and buttock.      At BEST  10/10     At WORST  10/10 on the WORST day.      On average pain is rated as 10/10.     Today the pain is rated as 10/10    The pain is described as shooting, throbbing and tight band      Symptoms interfere with daily activity, sleeping and work.     Exacerbating factors: Sitting, Standing, Laying, Walking, Night Time, Morning, Lifting and Getting out of bed/chair.      Mitigating factors medications and physical therapy.     Patient denies night fever/night sweats, urinary  incontinence, bowel incontinence, significant weight loss, significant motor weakness and loss of sensations.  Patient denies any suicidal or homicidal ideations    Pain Medications:  Current:  Tylenol      Tried in Past:  NSAIDs -Aspirin  TCA -Never  SNRI -Never  Anti-convulsants - gabapentin and Lyrica- side effects  Muscle Relaxants - flexeril (no help)  Opioids-tramadol and norco    Physical Therapy/Home Exercise: yes       report:  Reviewed and consistent with medication use as prescribed.    Pain Procedures:   None    Chiropractor -never  Acupuncture - never  TENS unit -never  Spinal decompression -never  Joint replacement -never, previous right knee arthroscopy    Imaging:      Narrative     EXAMINATION:  MRI LUMBAR SPINE WITHOUT CONTRAST    CLINICAL HISTORY:  lower back pain with bilateral lower extremity radiculopathy; Intervertebral disc disorders with radiculopathy, lumbar region    TECHNIQUE:  Multiplanar, multisequence MR images were acquired from the thoracolumbar junction to the sacrum without the administration of contrast.    COMPARISON:  Same-day radiograph    FINDINGS:  Alignment: Normal.    Vertebrae: Normal marrow signal. No fracture.    Discs: Normal height and signal.    Cord: Normal.  Conus terminates at L1-L2    Degenerative findings:    T12-L1: Unremarkable.    L1-L2: Unremarkable.    L2-L3: Minimal circumferential disc bulge.  No compressive sequelae.    L3-L4: Circumferential disc bulge and minimal facet hypertrophy.  Mild bilateral neural foraminal narrowing.  No canal stenosis.    L4-L5: Circumferential disc bulge and mild facet hypertrophy.  Mild bilateral neural foraminal narrowing.  No canal stenosis.    L5-S1: Minimal circumferential disc bulge without compressive sequelae.    Paraspinal muscles & soft tissues: Unremarkable.      Impression       Mild degenerative change at L3-4 and L4-5 as above.     Narrative     EXAMINATION:  XR LUMBAR SPINE 5 VIEW WITH FLEX AND  EXT    CLINICAL HISTORY:  evaluate for facet arthropathy and r/o instability;  Intervertebral disc disorders with radiculopathy, lumbar region    TECHNIQUE:  Five views of the lumbar spine plus flexion extension views were performed.    COMPARISON:  None.    FINDINGS:  Five non-rib-bearing lumbar type vertebral bodies.  No acute fracture.  Alignment is maintained.  No instability.  No facet arthropathy.  No significant intervertebral disc height loss by radiograph.      Impression       No significant degenerative findings or instability.           Past Medical History:   Diagnosis Date    Anxiety     Diabetes mellitus     Hypertension      Past Surgical History:   Procedure Laterality Date    HYSTERECTOMY       Social History     Socioeconomic History    Marital status: Single     Spouse name: Not on file    Number of children: Not on file    Years of education: Not on file    Highest education level: Not on file   Social Needs    Financial resource strain: Not on file    Food insecurity - worry: Not on file    Food insecurity - inability: Not on file    Transportation needs - medical: Not on file    Transportation needs - non-medical: Not on file   Occupational History    Not on file   Tobacco Use    Smoking status: Never Smoker   Substance and Sexual Activity    Alcohol use: Yes     Comment: social    Drug use: No    Sexual activity: Not on file   Other Topics Concern    Not on file   Social History Narrative    Not on file     History reviewed. No pertinent family history.    Review of patient's allergies indicates:  No Known Allergies    Current Outpatient Medications   Medication Sig    albuterol (PROVENTIL) 2.5 mg /3 mL (0.083 %) nebulizer solution     amlodipine (NORVASC) 10 MG tablet Take 10 mg by mouth once daily.    atorvastatin (LIPITOR) 80 MG tablet     escitalopram oxalate (LEXAPRO) 20 MG tablet Take 20 mg by mouth once daily.    fluticasone (FLONASE) 50 mcg/actuation nasal  "spray SPRAY ONCE IEN ONCE D    hydrocodone-chlorpheniramine (TUSSIONEX) 10-8 mg/5 mL suspension TK 10 ML PO Q 12 H PRN FOR COUGH FOR UP TO 10 DAYS    metformin (GLUMETZA) 500 MG (MOD) 24 hr tablet Take 1,000 mg by mouth 2 (two) times daily with meals.     methylPREDNISolone (MEDROL DOSEPACK) 4 mg tablet use as directed    ONETOUCH DELICA LANCETS 33 gauge Misc TEST ONCE D    ONETOUCH ULTRA BLUE TEST STRIP Strp TEST ONCE D BEFORE BREAKFAST    ONETOUCH ULTRAMINI kit UTD    sertraline (ZOLOFT) 100 MG tablet     VENTOLIN HFA 90 mcg/actuation inhaler      No current facility-administered medications for this visit.        REVIEW OF SYSTEMS:    GENERAL:  No weight loss, malaise or fevers.  HEENT:   No recent changes in vision or hearing  NECK:  Negative for lumps, no difficulty with swallowing.  RESPIRATORY:  Negative for cough, wheezing or shortness of breath, patient denies any recent URI.  CARDIOVASCULAR:  Negative for chest pain, leg swelling or palpitations.  GI:  Negative for abdominal discomfort, blood in stools or black stools or change in bowel habits.  MUSCULOSKELETAL:  See HPI.  SKIN:  Negative for lesions, rash, and itching.  PSYCH:  No mood disorder or recent psychosocial stressors.  Patient'sleep is disturbed secondary to pain.  HEMATOLOGY/LYMPHOLOGY:  Negative for prolonged bleeding, bruising easily or swollen nodes.  Patient is not currently taking any anti-coagulants  ENDO: DM2  NEURO:   No history of headaches, syncope, paralysis, seizures or tremors.  All other reviewed and negative other than HPI.    OBJECTIVE:    /88   Pulse 84   Temp 98.4 °F (36.9 °C)   Ht 5' 6" (1.676 m)   Wt 112.6 kg (248 lb 3.8 oz)   BMI 40.07 kg/m²     PHYSICAL EXAMINATION:    GENERAL: Well appearing, in no acute distress, alert and oriented x3.  PSYCH:  Mood and affect appropriate.  SKIN: Skin color, texture, turgor normal, no rashes or lesions.   HEAD/FACE:  Normocephalic, atraumatic. Cranial nerves grossly " intact.  CV: RRR with palpation of the radial artery.  PULM: No evidence of respiratory difficulty, symmetric chest rise.  BACK: Negative straight leg raising. There is pain with palpation over the facet joints of the lumbar spine bilaterally. There is decreased range of motion with extension to 15 degrees, and facet loading maneuvers cause reproducible pain.    EXTREMITIES: Peripheral joint ROM is full and pain free without obvious instability or laxity in all four extremities. No deformities, edema, or skin discoloration. Good capillary refill.  MUSCULOSKELETAL: Hip, and knee provocative maneuvers are negative.  There is pain with palpation over the sacroiliac joints bilaterally.  There is no pain to palpation over the greater trochanteric bursa bilaterally.  5/5 strength in right ankle with plantar and dorsiflexion. 5/5 strength in left ankle with plantar and dorsiflexion. 5/5 strength with right knee flexion and extension. 5/5 strength with left knee flexion and extension. 5/5 strength in right EHL, 5/5 strength in left EHL.  No atrophy or tone abnormalities are noted.  NEURO: Bilateral lower extremity coordination and muscle stretch reflexes are physiologic and symmetric.  Plantar response are downgoing. No clonus.  No loss of sensation is noted.  GAIT: Antalgic, ambulates without assistance.    ASSESSMENT: 42 y.o. year old female with pain, consistent with     Encounter Diagnoses   Name Primary?    Lumbosacral radiculopathy Yes    Lumbar spondylosis     Spinal stenosis of lumbar region, unspecified whether neurogenic claudication present        PLAN:     - Recent lumbar MRI and XRAYs reviewed in detail with patient today.    - D/C Flexeril due to limited benefit.  She declined any other adjuvant medications today.    - She requested Norco and I informed her that we do not recommend opioid medications for her pain.    - Schedule for L4/5 [place] TESS.    - Consider PT in the future.    - RTC in 2 weeks.      The  above plan and management options were discussed at length with patient. Patient is in agreement with the above and verbalized understanding.     Selena Mcgarry    10/03/2018

## 2018-10-19 ENCOUNTER — OFFICE VISIT (OUTPATIENT)
Dept: PAIN MEDICINE | Facility: CLINIC | Age: 43
End: 2018-10-19
Payer: MEDICARE

## 2018-10-19 DIAGNOSIS — M54.16 LUMBAR RADICULOPATHY: Primary | ICD-10-CM

## 2018-10-19 PROCEDURE — 99213 OFFICE O/P EST LOW 20 MIN: CPT | Mod: PBBFAC | Performed by: NURSE PRACTITIONER

## 2018-10-19 PROCEDURE — 99999 PR PBB SHADOW E&M-EST. PATIENT-LVL III: CPT | Mod: PBBFAC,,, | Performed by: NURSE PRACTITIONER

## 2018-10-19 PROCEDURE — 99499 UNLISTED E&M SERVICE: CPT | Mod: S$PBB,,, | Performed by: NURSE PRACTITIONER

## 2018-10-19 NOTE — H&P (VIEW-ONLY)
Chronic Pain - New Consult    Referring Physician: No ref. provider found    Chief Complaint:   Chief Complaint   Patient presents with    Low-back Pain        SUBJECTIVE: Disclaimer: This note has been generated using voice-recognition software. There may be typographical errors that have been missed during proof-reading    Interval History 10/3/2018:  The patient returns for follow up and imaging review.  She continues to report pain across the lower back.  She reports radiation down there posterolateral aspect of both legs, right greater than left with accompanied numbness.  She also reports pain and numbness to both upper extremities.  She denies any significant weakness.  She reports no relief with recent steroid pack.  She has been taking [place] from [facility] and requests a refill of this.  Her recent MRI shows disc bulges with mild NF narrowing.  Her pain today is 10/10.  The patient denies any bowel or bladder incontinence or signs of saddle paresthesia.  The patient denies any major medical changes since last office visit.    Initial encounter:    Juan Carlos Turner presents to the clinic for the evaluation of lower back pain. The pain started 3 years ago and symptoms have been worsening.    Brief history:  Previous MRI 1 year ago, stated to have disk herniations, but imaging not brought with the patient today    Pain Description:    The pain is located in the lower back area and radiates to the legs and buttock.      At BEST  10/10     At WORST  10/10 on the WORST day.      On average pain is rated as 10/10.     Today the pain is rated as 10/10    The pain is described as shooting, throbbing and tight band      Symptoms interfere with daily activity, sleeping and work.     Exacerbating factors: Sitting, Standing, Laying, Walking, Night Time, Morning, Lifting and Getting out of bed/chair.      Mitigating factors medications and physical therapy.     Patient denies night fever/night sweats, urinary  incontinence, bowel incontinence, significant weight loss, significant motor weakness and loss of sensations.  Patient denies any suicidal or homicidal ideations    Pain Medications:  Current:  Tylenol      Tried in Past:  NSAIDs -Aspirin  TCA -Never  SNRI -Never  Anti-convulsants - gabapentin and Lyrica- side effects  Muscle Relaxants - flexeril (no help)  Opioids-tramadol and norco    Physical Therapy/Home Exercise: yes       report:  Reviewed and consistent with medication use as prescribed.    Pain Procedures:   None    Chiropractor -never  Acupuncture - never  TENS unit -never  Spinal decompression -never  Joint replacement -never, previous right knee arthroscopy    Imaging:      Narrative     EXAMINATION:  MRI LUMBAR SPINE WITHOUT CONTRAST    CLINICAL HISTORY:  lower back pain with bilateral lower extremity radiculopathy; Intervertebral disc disorders with radiculopathy, lumbar region    TECHNIQUE:  Multiplanar, multisequence MR images were acquired from the thoracolumbar junction to the sacrum without the administration of contrast.    COMPARISON:  Same-day radiograph    FINDINGS:  Alignment: Normal.    Vertebrae: Normal marrow signal. No fracture.    Discs: Normal height and signal.    Cord: Normal.  Conus terminates at L1-L2    Degenerative findings:    T12-L1: Unremarkable.    L1-L2: Unremarkable.    L2-L3: Minimal circumferential disc bulge.  No compressive sequelae.    L3-L4: Circumferential disc bulge and minimal facet hypertrophy.  Mild bilateral neural foraminal narrowing.  No canal stenosis.    L4-L5: Circumferential disc bulge and mild facet hypertrophy.  Mild bilateral neural foraminal narrowing.  No canal stenosis.    L5-S1: Minimal circumferential disc bulge without compressive sequelae.    Paraspinal muscles & soft tissues: Unremarkable.      Impression       Mild degenerative change at L3-4 and L4-5 as above.     Narrative     EXAMINATION:  XR LUMBAR SPINE 5 VIEW WITH FLEX AND  EXT    CLINICAL HISTORY:  evaluate for facet arthropathy and r/o instability;  Intervertebral disc disorders with radiculopathy, lumbar region    TECHNIQUE:  Five views of the lumbar spine plus flexion extension views were performed.    COMPARISON:  None.    FINDINGS:  Five non-rib-bearing lumbar type vertebral bodies.  No acute fracture.  Alignment is maintained.  No instability.  No facet arthropathy.  No significant intervertebral disc height loss by radiograph.      Impression       No significant degenerative findings or instability.           Past Medical History:   Diagnosis Date    Anxiety     Diabetes mellitus     Hypertension      Past Surgical History:   Procedure Laterality Date    HYSTERECTOMY       Social History     Socioeconomic History    Marital status: Single     Spouse name: Not on file    Number of children: Not on file    Years of education: Not on file    Highest education level: Not on file   Social Needs    Financial resource strain: Not on file    Food insecurity - worry: Not on file    Food insecurity - inability: Not on file    Transportation needs - medical: Not on file    Transportation needs - non-medical: Not on file   Occupational History    Not on file   Tobacco Use    Smoking status: Never Smoker   Substance and Sexual Activity    Alcohol use: Yes     Comment: social    Drug use: No    Sexual activity: Not on file   Other Topics Concern    Not on file   Social History Narrative    Not on file     No family history on file.    Review of patient's allergies indicates:  No Known Allergies    Current Outpatient Medications   Medication Sig    albuterol (PROVENTIL) 2.5 mg /3 mL (0.083 %) nebulizer solution     amlodipine (NORVASC) 10 MG tablet Take 10 mg by mouth once daily.    atorvastatin (LIPITOR) 80 MG tablet     escitalopram oxalate (LEXAPRO) 20 MG tablet Take 20 mg by mouth once daily.    fluticasone (FLONASE) 50 mcg/actuation nasal spray SPRAY ONCE IEN  "ONCE D    hydrocodone-chlorpheniramine (TUSSIONEX) 10-8 mg/5 mL suspension TK 10 ML PO Q 12 H PRN FOR COUGH FOR UP TO 10 DAYS    metformin (GLUMETZA) 500 MG (MOD) 24 hr tablet Take 1,000 mg by mouth 2 (two) times daily with meals.     ONETOUCH DELICA LANCETS 33 gauge Misc TEST ONCE D    ONETOUCH ULTRA BLUE TEST STRIP Strp TEST ONCE D BEFORE BREAKFAST    ONETOUCH ULTRAMINI kit UTD    sertraline (ZOLOFT) 100 MG tablet     VENTOLIN HFA 90 mcg/actuation inhaler      No current facility-administered medications for this visit.        REVIEW OF SYSTEMS:    GENERAL:  No weight loss, malaise or fevers.  HEENT:   No recent changes in vision or hearing  NECK:  Negative for lumps, no difficulty with swallowing.  RESPIRATORY:  Negative for cough, wheezing or shortness of breath, patient denies any recent URI.  CARDIOVASCULAR:  Negative for chest pain, leg swelling or palpitations.  GI:  Negative for abdominal discomfort, blood in stools or black stools or change in bowel habits.  MUSCULOSKELETAL:  See HPI.  SKIN:  Negative for lesions, rash, and itching.  PSYCH:  No mood disorder or recent psychosocial stressors.  Patient'sleep is disturbed secondary to pain.  HEMATOLOGY/LYMPHOLOGY:  Negative for prolonged bleeding, bruising easily or swollen nodes.  Patient is not currently taking any anti-coagulants  ENDO: DM2  NEURO:   No history of headaches, syncope, paralysis, seizures or tremors.  All other reviewed and negative other than HPI.    OBJECTIVE:    Resp 18   Ht 5' 6" (1.676 m)   Wt 113 kg (249 lb 1.6 oz)   BMI 40.21 kg/m²     PHYSICAL EXAMINATION:    GENERAL: Well appearing, in no acute distress, alert and oriented x3.  PSYCH:  Mood and affect appropriate.  SKIN: Skin color, texture, turgor normal, no rashes or lesions.   HEAD/FACE:  Normocephalic, atraumatic. Cranial nerves grossly intact.  CV: RRR with palpation of the radial artery.  PULM: No evidence of respiratory difficulty, symmetric chest rise.  BACK: " Negative straight leg raising. There is pain with palpation over the facet joints of the lumbar spine bilaterally. There is decreased range of motion with extension to 15 degrees, and facet loading maneuvers cause reproducible pain.    EXTREMITIES: Peripheral joint ROM is full and pain free without obvious instability or laxity in all four extremities. No deformities, edema, or skin discoloration. Good capillary refill.  MUSCULOSKELETAL: Hip, and knee provocative maneuvers are negative.  There is pain with palpation over the sacroiliac joints bilaterally.  There is no pain to palpation over the greater trochanteric bursa bilaterally.  5/5 strength in right ankle with plantar and dorsiflexion. 5/5 strength in left ankle with plantar and dorsiflexion. 5/5 strength with right knee flexion and extension. 5/5 strength with left knee flexion and extension. 5/5 strength in right EHL, 5/5 strength in left EHL.  No atrophy or tone abnormalities are noted.  NEURO: Bilateral lower extremity coordination and muscle stretch reflexes are physiologic and symmetric.  Plantar response are downgoing. No clonus.  No loss of sensation is noted.  GAIT: Antalgic, ambulates without assistance.    ASSESSMENT: 42 y.o. year old female with pain, consistent with     No diagnosis found.    PLAN:     - Recent lumbar MRI and XRAYs reviewed in detail with patient today.    - D/C Flexeril due to limited benefit.  She declined any other adjuvant medications today.    - She requested Norco and I informed her that we do not recommend opioid medications for her pain.    - Schedule for L4/5 IL TESS.    - Consider PT in the future.    - RTC in 2 weeks.      The above plan and management options were discussed at length with patient. Patient is in agreement with the above and verbalized understanding.     Selena Mcgarry    10/19/2018

## 2018-10-19 NOTE — PROGRESS NOTES
Chronic Pain - New Consult    Referring Physician: No ref. provider found    Chief Complaint:   Chief Complaint   Patient presents with    Low-back Pain        SUBJECTIVE: Disclaimer: This note has been generated using voice-recognition software. There may be typographical errors that have been missed during proof-reading    Interval History 10/3/2018:  The patient returns for follow up and imaging review.  She continues to report pain across the lower back.  She reports radiation down there posterolateral aspect of both legs, right greater than left with accompanied numbness.  She also reports pain and numbness to both upper extremities.  She denies any significant weakness.  She reports no relief with recent steroid pack.  She has been taking [place] from [facility] and requests a refill of this.  Her recent MRI shows disc bulges with mild NF narrowing.  Her pain today is 10/10.  The patient denies any bowel or bladder incontinence or signs of saddle paresthesia.  The patient denies any major medical changes since last office visit.    Initial encounter:    Juan Carlos Turner presents to the clinic for the evaluation of lower back pain. The pain started 3 years ago and symptoms have been worsening.    Brief history:  Previous MRI 1 year ago, stated to have disk herniations, but imaging not brought with the patient today    Pain Description:    The pain is located in the lower back area and radiates to the legs and buttock.      At BEST  10/10     At WORST  10/10 on the WORST day.      On average pain is rated as 10/10.     Today the pain is rated as 10/10    The pain is described as shooting, throbbing and tight band      Symptoms interfere with daily activity, sleeping and work.     Exacerbating factors: Sitting, Standing, Laying, Walking, Night Time, Morning, Lifting and Getting out of bed/chair.      Mitigating factors medications and physical therapy.     Patient denies night fever/night sweats, urinary  incontinence, bowel incontinence, significant weight loss, significant motor weakness and loss of sensations.  Patient denies any suicidal or homicidal ideations    Pain Medications:  Current:  Tylenol      Tried in Past:  NSAIDs -Aspirin  TCA -Never  SNRI -Never  Anti-convulsants - gabapentin and Lyrica- side effects  Muscle Relaxants - flexeril (no help)  Opioids-tramadol and norco    Physical Therapy/Home Exercise: yes       report:  Reviewed and consistent with medication use as prescribed.    Pain Procedures:   None    Chiropractor -never  Acupuncture - never  TENS unit -never  Spinal decompression -never  Joint replacement -never, previous right knee arthroscopy    Imaging:      Narrative     EXAMINATION:  MRI LUMBAR SPINE WITHOUT CONTRAST    CLINICAL HISTORY:  lower back pain with bilateral lower extremity radiculopathy; Intervertebral disc disorders with radiculopathy, lumbar region    TECHNIQUE:  Multiplanar, multisequence MR images were acquired from the thoracolumbar junction to the sacrum without the administration of contrast.    COMPARISON:  Same-day radiograph    FINDINGS:  Alignment: Normal.    Vertebrae: Normal marrow signal. No fracture.    Discs: Normal height and signal.    Cord: Normal.  Conus terminates at L1-L2    Degenerative findings:    T12-L1: Unremarkable.    L1-L2: Unremarkable.    L2-L3: Minimal circumferential disc bulge.  No compressive sequelae.    L3-L4: Circumferential disc bulge and minimal facet hypertrophy.  Mild bilateral neural foraminal narrowing.  No canal stenosis.    L4-L5: Circumferential disc bulge and mild facet hypertrophy.  Mild bilateral neural foraminal narrowing.  No canal stenosis.    L5-S1: Minimal circumferential disc bulge without compressive sequelae.    Paraspinal muscles & soft tissues: Unremarkable.      Impression       Mild degenerative change at L3-4 and L4-5 as above.     Narrative     EXAMINATION:  XR LUMBAR SPINE 5 VIEW WITH FLEX AND  EXT    CLINICAL HISTORY:  evaluate for facet arthropathy and r/o instability;  Intervertebral disc disorders with radiculopathy, lumbar region    TECHNIQUE:  Five views of the lumbar spine plus flexion extension views were performed.    COMPARISON:  None.    FINDINGS:  Five non-rib-bearing lumbar type vertebral bodies.  No acute fracture.  Alignment is maintained.  No instability.  No facet arthropathy.  No significant intervertebral disc height loss by radiograph.      Impression       No significant degenerative findings or instability.           Past Medical History:   Diagnosis Date    Anxiety     Diabetes mellitus     Hypertension      Past Surgical History:   Procedure Laterality Date    HYSTERECTOMY       Social History     Socioeconomic History    Marital status: Single     Spouse name: Not on file    Number of children: Not on file    Years of education: Not on file    Highest education level: Not on file   Social Needs    Financial resource strain: Not on file    Food insecurity - worry: Not on file    Food insecurity - inability: Not on file    Transportation needs - medical: Not on file    Transportation needs - non-medical: Not on file   Occupational History    Not on file   Tobacco Use    Smoking status: Never Smoker   Substance and Sexual Activity    Alcohol use: Yes     Comment: social    Drug use: No    Sexual activity: Not on file   Other Topics Concern    Not on file   Social History Narrative    Not on file     No family history on file.    Review of patient's allergies indicates:  No Known Allergies    Current Outpatient Medications   Medication Sig    albuterol (PROVENTIL) 2.5 mg /3 mL (0.083 %) nebulizer solution     amlodipine (NORVASC) 10 MG tablet Take 10 mg by mouth once daily.    atorvastatin (LIPITOR) 80 MG tablet     escitalopram oxalate (LEXAPRO) 20 MG tablet Take 20 mg by mouth once daily.    fluticasone (FLONASE) 50 mcg/actuation nasal spray SPRAY ONCE IEN  "ONCE D    hydrocodone-chlorpheniramine (TUSSIONEX) 10-8 mg/5 mL suspension TK 10 ML PO Q 12 H PRN FOR COUGH FOR UP TO 10 DAYS    metformin (GLUMETZA) 500 MG (MOD) 24 hr tablet Take 1,000 mg by mouth 2 (two) times daily with meals.     ONETOUCH DELICA LANCETS 33 gauge Misc TEST ONCE D    ONETOUCH ULTRA BLUE TEST STRIP Strp TEST ONCE D BEFORE BREAKFAST    ONETOUCH ULTRAMINI kit UTD    sertraline (ZOLOFT) 100 MG tablet     VENTOLIN HFA 90 mcg/actuation inhaler      No current facility-administered medications for this visit.        REVIEW OF SYSTEMS:    GENERAL:  No weight loss, malaise or fevers.  HEENT:   No recent changes in vision or hearing  NECK:  Negative for lumps, no difficulty with swallowing.  RESPIRATORY:  Negative for cough, wheezing or shortness of breath, patient denies any recent URI.  CARDIOVASCULAR:  Negative for chest pain, leg swelling or palpitations.  GI:  Negative for abdominal discomfort, blood in stools or black stools or change in bowel habits.  MUSCULOSKELETAL:  See HPI.  SKIN:  Negative for lesions, rash, and itching.  PSYCH:  No mood disorder or recent psychosocial stressors.  Patient'sleep is disturbed secondary to pain.  HEMATOLOGY/LYMPHOLOGY:  Negative for prolonged bleeding, bruising easily or swollen nodes.  Patient is not currently taking any anti-coagulants  ENDO: DM2  NEURO:   No history of headaches, syncope, paralysis, seizures or tremors.  All other reviewed and negative other than HPI.    OBJECTIVE:    Resp 18   Ht 5' 6" (1.676 m)   Wt 113 kg (249 lb 1.6 oz)   BMI 40.21 kg/m²     PHYSICAL EXAMINATION:    GENERAL: Well appearing, in no acute distress, alert and oriented x3.  PSYCH:  Mood and affect appropriate.  SKIN: Skin color, texture, turgor normal, no rashes or lesions.   HEAD/FACE:  Normocephalic, atraumatic. Cranial nerves grossly intact.  CV: RRR with palpation of the radial artery.  PULM: No evidence of respiratory difficulty, symmetric chest rise.  BACK: " Negative straight leg raising. There is pain with palpation over the facet joints of the lumbar spine bilaterally. There is decreased range of motion with extension to 15 degrees, and facet loading maneuvers cause reproducible pain.    EXTREMITIES: Peripheral joint ROM is full and pain free without obvious instability or laxity in all four extremities. No deformities, edema, or skin discoloration. Good capillary refill.  MUSCULOSKELETAL: Hip, and knee provocative maneuvers are negative.  There is pain with palpation over the sacroiliac joints bilaterally.  There is no pain to palpation over the greater trochanteric bursa bilaterally.  5/5 strength in right ankle with plantar and dorsiflexion. 5/5 strength in left ankle with plantar and dorsiflexion. 5/5 strength with right knee flexion and extension. 5/5 strength with left knee flexion and extension. 5/5 strength in right EHL, 5/5 strength in left EHL.  No atrophy or tone abnormalities are noted.  NEURO: Bilateral lower extremity coordination and muscle stretch reflexes are physiologic and symmetric.  Plantar response are downgoing. No clonus.  No loss of sensation is noted.  GAIT: Antalgic, ambulates without assistance.    ASSESSMENT: 42 y.o. year old female with pain, consistent with     No diagnosis found.    PLAN:     - Recent lumbar MRI and XRAYs reviewed in detail with patient today.    - D/C Flexeril due to limited benefit.  She declined any other adjuvant medications today.    - She requested Norco and I informed her that we do not recommend opioid medications for her pain.    - Schedule for L4/5 IL TESS.    - Consider PT in the future.    - RTC in 2 weeks.      The above plan and management options were discussed at length with patient. Patient is in agreement with the above and verbalized understanding.     Selena Mcgarry    10/19/2018

## 2018-10-23 ENCOUNTER — HOSPITAL ENCOUNTER (OUTPATIENT)
Facility: OTHER | Age: 43
Discharge: HOME | End: 2018-10-23
Attending: ANESTHESIOLOGY | Admitting: ANESTHESIOLOGY
Payer: MEDICARE

## 2018-10-23 VITALS
OXYGEN SATURATION: 98 % | TEMPERATURE: 99 F | WEIGHT: 251 LBS | DIASTOLIC BLOOD PRESSURE: 67 MMHG | HEART RATE: 76 BPM | SYSTOLIC BLOOD PRESSURE: 128 MMHG | HEIGHT: 66 IN | BODY MASS INDEX: 40.34 KG/M2 | RESPIRATION RATE: 18 BRPM

## 2018-10-23 DIAGNOSIS — M51.16 LUMBAR DISC HERNIATION WITH RADICULOPATHY: Primary | ICD-10-CM

## 2018-10-23 LAB — POCT GLUCOSE: 144 MG/DL (ref 70–110)

## 2018-10-23 PROCEDURE — 62323 NJX INTERLAMINAR LMBR/SAC: CPT | Performed by: ANESTHESIOLOGY

## 2018-10-23 PROCEDURE — 25500020 PHARM REV CODE 255: Performed by: ANESTHESIOLOGY

## 2018-10-23 PROCEDURE — 63600175 PHARM REV CODE 636 W HCPCS: Performed by: ANESTHESIOLOGY

## 2018-10-23 PROCEDURE — 25000003 PHARM REV CODE 250: Performed by: STUDENT IN AN ORGANIZED HEALTH CARE EDUCATION/TRAINING PROGRAM

## 2018-10-23 PROCEDURE — 25000003 PHARM REV CODE 250: Performed by: ANESTHESIOLOGY

## 2018-10-23 PROCEDURE — 99152 MOD SED SAME PHYS/QHP 5/>YRS: CPT | Mod: ,,, | Performed by: ANESTHESIOLOGY

## 2018-10-23 PROCEDURE — A4216 STERILE WATER/SALINE, 10 ML: HCPCS | Performed by: ANESTHESIOLOGY

## 2018-10-23 PROCEDURE — 82947 ASSAY GLUCOSE BLOOD QUANT: CPT | Performed by: ANESTHESIOLOGY

## 2018-10-23 PROCEDURE — 62323 NJX INTERLAMINAR LMBR/SAC: CPT | Mod: ,,, | Performed by: ANESTHESIOLOGY

## 2018-10-23 RX ORDER — METHYLPREDNISOLONE ACETATE 40 MG/ML
INJECTION, SUSPENSION INTRA-ARTICULAR; INTRALESIONAL; INTRAMUSCULAR; SOFT TISSUE
Status: DISCONTINUED | OUTPATIENT
Start: 2018-10-23 | End: 2018-10-23 | Stop reason: HOSPADM

## 2018-10-23 RX ORDER — BUPIVACAINE HYDROCHLORIDE 2.5 MG/ML
INJECTION, SOLUTION EPIDURAL; INFILTRATION; INTRACAUDAL
Status: DISCONTINUED | OUTPATIENT
Start: 2018-10-23 | End: 2018-10-23 | Stop reason: HOSPADM

## 2018-10-23 RX ORDER — FENTANYL CITRATE 50 UG/ML
INJECTION, SOLUTION INTRAMUSCULAR; INTRAVENOUS
Status: DISCONTINUED | OUTPATIENT
Start: 2018-10-23 | End: 2018-10-23 | Stop reason: HOSPADM

## 2018-10-23 RX ORDER — DEXAMETHASONE SODIUM PHOSPHATE 10 MG/ML
INJECTION INTRAMUSCULAR; INTRAVENOUS
Status: DISCONTINUED | OUTPATIENT
Start: 2018-10-23 | End: 2018-10-23 | Stop reason: HOSPADM

## 2018-10-23 RX ORDER — SODIUM CHLORIDE 9 MG/ML
INJECTION, SOLUTION INTRAVENOUS CONTINUOUS
Status: DISCONTINUED | OUTPATIENT
Start: 2018-10-23 | End: 2018-10-23 | Stop reason: HOSPADM

## 2018-10-23 RX ORDER — LIDOCAINE HYDROCHLORIDE 10 MG/ML
INJECTION INFILTRATION; PERINEURAL
Status: DISCONTINUED | OUTPATIENT
Start: 2018-10-23 | End: 2018-10-23 | Stop reason: HOSPADM

## 2018-10-23 RX ORDER — MIDAZOLAM HYDROCHLORIDE 1 MG/ML
INJECTION INTRAMUSCULAR; INTRAVENOUS
Status: DISCONTINUED | OUTPATIENT
Start: 2018-10-23 | End: 2018-10-23 | Stop reason: HOSPADM

## 2018-10-23 RX ORDER — SODIUM CHLORIDE 9 MG/ML
INJECTION, SOLUTION INTRAMUSCULAR; INTRAVENOUS; SUBCUTANEOUS
Status: DISCONTINUED | OUTPATIENT
Start: 2018-10-23 | End: 2018-10-23 | Stop reason: HOSPADM

## 2018-10-23 RX ADMIN — SODIUM CHLORIDE: 0.9 INJECTION, SOLUTION INTRAVENOUS at 11:10

## 2018-10-23 NOTE — OP NOTE
Lumbar Interlaminar Epidural Steroid Injection under Fluoroscopic Guidance.   Time-out taken to identify patient and procedure prior to starting the procedure.     10/23/2018    PROCEDURE: Interlaminar epidural steroid injection under fluoroscopic guidance.     Pre-Op diagnosis: Lumbar radiculopathy [M54.16]    Post-Op diagnosis: Lumbar radiculopathy [M54.16]    PHYSICIAN: SIDDHARTHA CHAPMAN     Assistants:  Fuentes Austin MD PGY-3  I was present and supervising all critical portions of the procedure      ESTIMATED BLOOD LOSS: none.     COMPLICATIONS: none.     SPECIMENS: none    TECHNIQUE: With the patient laying in a prone position, the area was prepped and draped in the usual sterile fashion using ChloraPrep and a fenestrated drape. 1% lidocaine was given using a 27-gauge needle by raising a wheal and going down to the hub of the needle over the L4/5 interlaminar space.  The interlaminar space was then approached with a 3.5 inch 18-gauge Touhy needle was introduced under fluoroscopic guidance in the AP and Lateral view. Once the Ligamentum flavum was encountered loss of resistance to saline was used to enter the epidural space. With positive loss of resistance and negative CSF or Blood, 3mL contrast dye Omnipaque (300mg/ml) was injected to confirm placement and there was no vascular runoff. Then 1ml 40mg/ml Depomedrol + 1mL 0.25% Bupivicaine + 8mL preservative free normal saline was injected slowly. Displacement of the radio opaque contrast after injection of the medication confirmed that the medication went into the area of the epidural space.  The patient tolerated the procedure well.     Conscious sedation provided by M.D    The patient was monitored with continuous pulse oximetry, EKG, and intermittent blood pressure monitors.  The patient was hemodynamically stable throughout the entire process was responsive to voice, and breathing spontaneously.  Supplemental O2 was provided at 2L/min via nasal cannula.   Patient was comfortable for the duration of the procedure. (See nurse documentation and case log for sedation time)    There was a total of 2mg IV Midazolam and 100mcg fentanyl was titrated for the procedure      The patient was monitored after the procedure.   They were given post-procedure and discharge instructions to follow at home.  The patient was discharged in a stable condition.

## 2018-10-23 NOTE — DISCHARGE SUMMARY
Discharge Note  Short Stay      SUMMARY     Admit Date: 10/23/2018    Attending Physician: Ed Pina      Discharge Physician: Ed Pina      Discharge Date: 10/23/2018 11:41 AM    Procedure(s) (LRB):  Injection, Steroid, Epidural LUMBAR L4/5 TESS (N/A)    Final Diagnosis: Lumbar radiculopathy [M54.16]    Disposition: Home or self care    Patient Instructions:   Current Discharge Medication List      CONTINUE these medications which have NOT CHANGED    Details   albuterol (PROVENTIL) 2.5 mg /3 mL (0.083 %) nebulizer solution Refills: 3      amlodipine (NORVASC) 10 MG tablet Take 10 mg by mouth once daily.      atorvastatin (LIPITOR) 80 MG tablet       escitalopram oxalate (LEXAPRO) 20 MG tablet Take 20 mg by mouth once daily.      fluticasone (FLONASE) 50 mcg/actuation nasal spray SPRAY ONCE IEN ONCE D  Refills: 0      hydrocodone-chlorpheniramine (TUSSIONEX) 10-8 mg/5 mL suspension TK 10 ML PO Q 12 H PRN FOR COUGH FOR UP TO 10 DAYS  Refills: 0      metformin (GLUMETZA) 500 MG (MOD) 24 hr tablet Take 1,000 mg by mouth 2 (two) times daily with meals.       ONETOUCH DELICA LANCETS 33 gauge Misc TEST ONCE D  Refills: 3      ONETOUCH ULTRA BLUE TEST STRIP Strp TEST ONCE D BEFORE BREAKFAST  Refills: 3      ONETOUCH ULTRAMINI kit UTD  Refills: 0      sertraline (ZOLOFT) 100 MG tablet       VENTOLIN HFA 90 mcg/actuation inhaler                  Discharge Diagnosis: Lumbar radiculopathy [M54.16]  Condition on Discharge: Stable with no complications to procedure   Diet on Discharge: Same as before.  Activity: as per instruction sheet.  Discharge to: Home with a responsible adult.  Follow up: 2-4 weeks

## 2018-10-23 NOTE — DISCHARGE INSTRUCTIONS
Adult Procedural Sedation Instructions    Recovery After Procedural Sedation (Adult)  You have been given medicine by vein to make you sleep during your surgery. This may have included both a pain medicine and sleeping medicine. Most of the effects have worn off. But you may still have some drowsiness for the next 6 to 8 hours.  Home care  Follow these guidelines when you get home:  · For the next 8 hours, you should be watched by a responsible adult. This person should make sure your condition is not getting worse.  · Don't drink any alcohol for the next 24 hours.  · Don't drive, operate dangerous machinery, or make important business or personal decisions during the next 24 hours.  Note: Your healthcare provider may tell you not to take any medicine by mouth for pain or sleep in the next 4 hours. These medicines may react with the medicines you were given in the hospital. This could cause a much stronger response than usual.  Follow-up care  Follow up with your healthcare provider if you are not alert and back to your usual level of activity within 12 hours.  When to seek medical advice  Call your healthcare provider right away if any of these occur:  · Drowsiness gets worse  · Weakness or dizziness gets worse  · Repeated vomiting  · You can't be awakened   Date Last Reviewed: 10/18/2016  © 2301-3130 [employer]. [address] [place] [address], [place] [address]. All rights reserved. This information is not intended as a substitute for professional medical care. Always follow your healthcare professional's instructions.       Thank you for allowing us to care for you today. You may receive a survey about the care we provided. Your feedback is valuable and helps us provide excellent care throughout the community.     Home Care Instructions for Pain Management:    1. DIET:   You may resume your normal diet today.   2. BATHING:   You may shower with luke warm water. No tub baths or anything that will soak  injection sites under water for the next 24 hours.  3. DRESSING:   You may remove your bandage today.   4. ACTIVITY LEVEL:   You may resume your normal activities 24 hrs after your procedure. Nothing strenuous today.  5. MEDICATIONS:   You may resume your normal medications today. To restart blood thinners, ask your doctor.  6. DRIVING    If you have received any sedatives by mouth today, you may not drive for 12 hours.    If you have received any sedation through your IV, you may not drive for 24 hrs.   7. SPECIAL INSTRUCTIONS:   No heat to the injection site for 24 hrs including, hot bath or shower, heating pad, moist heat, or hot tubs.    Use ice pack to injection site for any pain or discomfort.  Apply ice packs for 20 minute intervals as needed.    IF you have diabetes, be sure to monitor your blood sugar more closely. IF your injection contained steroids your blood sugar levels may become higher than normal.    If you are still having pain upon discharge:  Your pain may improve over the next 48 hours. The anesthetic (numbing medication) works immediately to 48 hours. IF your injection contained a steroid (anti-inflammatory medication), it takes approximately 3 days to start feeling relief and 7-10 days to see your greatest results from the medication. It is possible you may need subsequent injections. This would be discussed at your follow up appointment with pain management or your referring doctor.      PLEASE CALL YOUR DOCTOR IF:  1. Redness or swelling around the injection site.  2. Fever of 101 degrees or more  3. Drainage (pus) from the injection site.  4. For any continuous bleeding (some dried blood over the incision is normal.)    FOR EMERGENCIES:   If any unusual problems or difficulties occur during clinic hours, call [phone] or [phone].

## 2018-10-23 NOTE — INTERVAL H&P NOTE
The patient has been examined and the H&P has been reviewed:    I concur with the findings and no changes have occurred since H&P was written. Ms. Turner presents for lumbar IESI for lumbar radiculopathy mainly the right side. She denies changes in her health history.      Anesthesia/Surgery risks, benefits and alternative options discussed and understood by patient/family.          There are no hospital problems to display for this patient.

## 2018-11-16 ENCOUNTER — OFFICE VISIT (OUTPATIENT)
Dept: PAIN MEDICINE | Facility: CLINIC | Age: 43
End: 2018-11-16
Payer: MEDICARE

## 2018-11-16 VITALS
HEIGHT: 66 IN | TEMPERATURE: 98 F | SYSTOLIC BLOOD PRESSURE: 132 MMHG | BODY MASS INDEX: 39.68 KG/M2 | DIASTOLIC BLOOD PRESSURE: 84 MMHG | HEART RATE: 90 BPM | WEIGHT: 246.88 LBS | RESPIRATION RATE: 18 BRPM

## 2018-11-16 DIAGNOSIS — M47.816 FACET DEGENERATION OF LUMBAR REGION: ICD-10-CM

## 2018-11-16 DIAGNOSIS — M79.604 PAIN IN BOTH LOWER EXTREMITIES: ICD-10-CM

## 2018-11-16 DIAGNOSIS — M54.16 LUMBAR RADICULOPATHY: Primary | ICD-10-CM

## 2018-11-16 DIAGNOSIS — M47.816 LUMBAR SPONDYLOSIS: ICD-10-CM

## 2018-11-16 DIAGNOSIS — M79.605 PAIN IN BOTH LOWER EXTREMITIES: ICD-10-CM

## 2018-11-16 PROCEDURE — 99999 PR PBB SHADOW E&M-EST. PATIENT-LVL IV: CPT | Mod: PBBFAC,,, | Performed by: NURSE PRACTITIONER

## 2018-11-16 PROCEDURE — 99214 OFFICE O/P EST MOD 30 MIN: CPT | Mod: PBBFAC | Performed by: NURSE PRACTITIONER

## 2018-11-16 PROCEDURE — 99213 OFFICE O/P EST LOW 20 MIN: CPT | Mod: S$PBB,,, | Performed by: NURSE PRACTITIONER

## 2018-11-16 NOTE — PROGRESS NOTES
Chronic Pain - Established Visit    Referring Physician: No ref. provider found    Chief Complaint:   No chief complaint on file.       SUBJECTIVE: Disclaimer: This note has been generated using voice-recognition software. There may be typographical errors that have been missed during proof-reading    Interval History 11/16/2018:  The patient presents for injection follow up.  She is s/p L4-5 IL TESS.  She reports benefit only for the day of the procedure.  She continues to report pain across the lower back.  She is pointing a bit higher today.  She is also having pain down the back of both legs and the front of the right leg to the knee.  She has had multiple knee scopes in the past.  She has had treatment at both [facility] and [facility] in the past.  We were able to obtain documentation from [facility].  She reports being told that her problem may be vascular in nature.  She would like me to refer her to a vascular doctor here at [facility].  She does feel as though her right leg swelling sometimes.  She has tried Gabapentin, Lyrica and Cymbalta in the past with limited benefit.  She feels as though opioid medications have provided her the most benefit in the past.  Her pain today is 8/10.    Interval History 10/3/2018:  The patient returns for follow up and imaging review.  She continues to report pain across the lower back.  She reports radiation down there posterolateral aspect of both legs, right greater than left with accompanied numbness.  She also reports pain and numbness to both upper extremities.  She denies any significant weakness.  She reports no relief with recent steroid pack.  She has been taking [place] from [facility] and requests a refill of this.  Her recent MRI shows disc bulges with mild NF narrowing.  Her pain today is 10/10.  The patient denies any bowel or bladder incontinence or signs of saddle paresthesia.  The patient denies any major medical changes since last office visit.    Initial  encounter:    Juan Carlos Turner presents to the clinic for the evaluation of lower back pain. The pain started 3 years ago and symptoms have been worsening.    Brief history:  Previous MRI 1 year ago, stated to have disk herniations, but imaging not brought with the patient today    Pain Description:    The pain is located in the lower back area and radiates to the legs and buttock.      At BEST  10/10     At WORST  10/10 on the WORST day.      On average pain is rated as 10/10.     Today the pain is rated as 10/10    The pain is described as shooting, throbbing and tight band      Symptoms interfere with daily activity, sleeping and work.     Exacerbating factors: Sitting, Standing, Laying, Walking, Night Time, Morning, Lifting and Getting out of bed/chair.      Mitigating factors medications and physical therapy.     Patient denies night fever/night sweats, urinary incontinence, bowel incontinence, significant weight loss, significant motor weakness and loss of sensations.  Patient denies any suicidal or homicidal ideations    Pain Medications:  Current:  Tylenol    Tried in Past:  NSAIDs -Aspirin  TCA -Never  SNRI  Cymbalta  Anti-convulsants - gabapentin and Lyrica- side effects  Muscle Relaxants - flexeril and robaxin (no help)  Opioids-tramadol and norco    Physical Therapy/Home Exercise: yes       report:  Reviewed and consistent with medication use as prescribed.    Pain Procedures:   10/23/18 L4/5 IL TESS- no relief    Chiropractor -never  Acupuncture - never  TENS unit -never  Spinal decompression -never  Joint replacement -never, previous right knee arthroscopy    Imaging:      Narrative     EXAMINATION:  MRI LUMBAR SPINE WITHOUT CONTRAST    CLINICAL HISTORY:  lower back pain with bilateral lower extremity radiculopathy; Intervertebral disc disorders with radiculopathy, lumbar region    TECHNIQUE:  Multiplanar, multisequence MR images were acquired from the thoracolumbar junction to the sacrum without  the administration of contrast.    COMPARISON:  Same-day radiograph    FINDINGS:  Alignment: Normal.    Vertebrae: Normal marrow signal. No fracture.    Discs: Normal height and signal.    Cord: Normal.  Conus terminates at L1-L2    Degenerative findings:    T12-L1: Unremarkable.    L1-L2: Unremarkable.    L2-L3: Minimal circumferential disc bulge.  No compressive sequelae.    L3-L4: Circumferential disc bulge and minimal facet hypertrophy.  Mild bilateral neural foraminal narrowing.  No canal stenosis.    L4-L5: Circumferential disc bulge and mild facet hypertrophy.  Mild bilateral neural foraminal narrowing.  No canal stenosis.    L5-S1: Minimal circumferential disc bulge without compressive sequelae.    Paraspinal muscles & soft tissues: Unremarkable.      Impression       Mild degenerative change at L3-4 and L4-5 as above.     Narrative     EXAMINATION:  XR LUMBAR SPINE 5 VIEW WITH FLEX AND EXT    CLINICAL HISTORY:  evaluate for facet arthropathy and r/o instability;  Intervertebral disc disorders with radiculopathy, lumbar region    TECHNIQUE:  Five views of the lumbar spine plus flexion extension views were performed.    COMPARISON:  None.    FINDINGS:  Five non-rib-bearing lumbar type vertebral bodies.  No acute fracture.  Alignment is maintained.  No instability.  No facet arthropathy.  No significant intervertebral disc height loss by radiograph.      Impression       No significant degenerative findings or instability.           Past Medical History:   Diagnosis Date    Anxiety     Diabetes mellitus     Hypertension      Past Surgical History:   Procedure Laterality Date    EPIDURAL STEROID INJECTION N/A 10/23/2018    Procedure: Injection, Steroid, Epidural LUMBAR L4/5 TESS;  Surgeon: Ed Pina MD;  Location: [facility] PAIN [facility]GT;  Service: Pain Management;  Laterality: N/A;    HYSTERECTOMY      Injection, Steroid, Epidural LUMBAR L4/5 TESS N/A 10/23/2018    Performed by Ed Pina MD at [facility]  PAIN MGT     Social History     Socioeconomic History    Marital status: Single     Spouse name: Not on file    Number of children: Not on file    Years of education: Not on file    Highest education level: Not on file   Social Needs    Financial resource strain: Not on file    Food insecurity - worry: Not on file    Food insecurity - inability: Not on file    Transportation needs - medical: Not on file    Transportation needs - non-medical: Not on file   Occupational History    Not on file   Tobacco Use    Smoking status: Never Smoker   Substance and Sexual Activity    Alcohol use: Yes     Comment: social    Drug use: No    Sexual activity: Not on file   Other Topics Concern    Not on file   Social History Narrative    Not on file     No family history on file.    Review of patient's allergies indicates:  No Known Allergies    Current Outpatient Medications   Medication Sig    albuterol (PROVENTIL) 2.5 mg /3 mL (0.083 %) nebulizer solution     amlodipine (NORVASC) 10 MG tablet Take 10 mg by mouth once daily.    atorvastatin (LIPITOR) 80 MG tablet     escitalopram oxalate (LEXAPRO) 20 MG tablet Take 20 mg by mouth once daily.    fluticasone (FLONASE) 50 mcg/actuation nasal spray SPRAY ONCE IEN ONCE D    hydrocodone-chlorpheniramine (TUSSIONEX) 10-8 mg/5 mL suspension TK 10 ML PO Q 12 H PRN FOR COUGH FOR UP TO 10 DAYS    metformin (GLUMETZA) 500 MG (MOD) 24 hr tablet Take 1,000 mg by mouth 2 (two) times daily with meals.     ONETOUCH DELICA LANCETS 33 gauge Misc TEST ONCE D    ONETOUCH ULTRA BLUE TEST STRIP Strp TEST ONCE D BEFORE BREAKFAST    ONETOUCH ULTRAMINI kit UTD    sertraline (ZOLOFT) 100 MG tablet     VENTOLIN HFA 90 mcg/actuation inhaler      No current facility-administered medications for this visit.        REVIEW OF SYSTEMS:    GENERAL:  No weight loss, malaise or fevers.  HEENT:   No recent changes in vision or hearing  NECK:  Negative for lumps, no difficulty with  "swallowing.  RESPIRATORY:  Negative for cough, wheezing or shortness of breath, patient denies any recent URI.  CARDIOVASCULAR:  Negative for chest pain, leg swelling or palpitations.  GI:  Negative for abdominal discomfort, blood in stools or black stools or change in bowel habits.  MUSCULOSKELETAL:  See HPI.  SKIN:  Negative for lesions, rash, and itching.  PSYCH:  No mood disorder or recent psychosocial stressors.  Patient'sleep is disturbed secondary to pain.  HEMATOLOGY/LYMPHOLOGY:  Negative for prolonged bleeding, bruising easily or swollen nodes.  Patient is not currently taking any anti-coagulants  ENDO: DM2  NEURO:   No history of headaches, syncope, paralysis, seizures or tremors.  All other reviewed and negative other than HPI.    OBJECTIVE:    /84   Pulse 90   Resp 18   Ht 5' 6" (1.676 m)   Wt 112 kg (246 lb 14.4 oz)   BMI 39.85 kg/m²     PHYSICAL EXAMINATION:    GENERAL: Well appearing, in no acute distress, alert and oriented x3.  PSYCH:  Mood and affect appropriate.  SKIN: Skin color, texture, turgor normal, no rashes or lesions.   HEAD/FACE:  Normocephalic, atraumatic. Cranial nerves grossly intact.  CV: RRR with palpation of the radial artery.  PULM: No evidence of respiratory difficulty, symmetric chest rise.  BACK: Negative straight leg raising. There is pain with palpation over the facet joints of the thoracic and lumbar spine bilaterally. There is decreased range of motion with extension to 15 degrees, and facet loading maneuvers cause reproducible pain.    EXTREMITIES: Peripheral joint ROM is full and pain free without obvious instability or laxity in all four extremities. No deformities, edema, or skin discoloration. Good capillary refill.  MUSCULOSKELETAL:  Pain with extension of right knee.  There is pain with palpation over the sacroiliac joints bilaterally.  There is no pain to palpation over the greater trochanteric bursa bilaterally.  5/5 strength in right ankle with plantar " and dorsiflexion. 5/5 strength in left ankle with plantar and dorsiflexion. 5/5 strength with right knee flexion and extension. 5/5 strength with left knee flexion and extension. 5/5 strength in right EHL, 5/5 strength in left EHL.  No atrophy or tone abnormalities are noted.  NEURO: Bilateral lower extremity coordination and muscle stretch reflexes are physiologic and symmetric.  Plantar response are downgoing. No clonus.  No loss of sensation is noted.  GAIT: Normal.    ASSESSMENT: 42 y.o. year old female with lower back and BLE pain, consistent with     Encounter Diagnoses   Name Primary?    Lumbar radiculopathy Yes    Lumbar spondylosis     Facet degeneration of lumbar region     Pain in both lower extremities        PLAN:     - Recent lumbar MRI and XRAYs reviewed.    - She declined any additional adjuvant medications today.  She declined restarting Gabapentin and titrating to higher dose.  She declined additional NSAID.    - She requested Norco or Tramadol and I again informed her that this was not recommended.    - Will refer to vascular medicine to rule out vascular cause of leg pain, per her request.    - She has an orthopedist at [facility] which she may see again.    - She declined PT.    - RTC in 1 month or sooner if needed.  She requests to see Dr Pina.      The above plan and management options were discussed at length with patient. Patient is in agreement with the above and verbalized understanding.     Selena Mcgarry    11/16/2018

## 2018-11-21 DIAGNOSIS — I82.4Z3 DEEP VEIN THROMBOSIS (DVT) OF DISTAL VEIN OF BOTH LOWER EXTREMITIES, UNSPECIFIED CHRONICITY: Primary | ICD-10-CM

## 2018-11-21 DIAGNOSIS — M79.605 BILATERAL LEG PAIN: Primary | ICD-10-CM

## 2018-11-21 DIAGNOSIS — M79.604 BILATERAL LEG PAIN: Primary | ICD-10-CM

## 2018-11-21 DIAGNOSIS — I87.2 VENOUS INSUFFICIENCY: ICD-10-CM

## 2018-12-14 ENCOUNTER — TELEPHONE (OUTPATIENT)
Dept: PAIN MEDICINE | Facility: CLINIC | Age: 43
End: 2018-12-14

## 2018-12-14 NOTE — TELEPHONE ENCOUNTER
Attempted to contact patient to reschedule appointment on 12/17/18 due to provider being in surgery, no answer, left detailed voice message and requested patient to return call to clinic and reschedule.

## 2019-01-10 ENCOUNTER — OFFICE VISIT (OUTPATIENT)
Dept: SLEEP MEDICINE | Facility: CLINIC | Age: 44
End: 2019-01-10
Payer: MEDICARE

## 2019-01-10 VITALS
WEIGHT: 246.94 LBS | BODY MASS INDEX: 39.69 KG/M2 | SYSTOLIC BLOOD PRESSURE: 114 MMHG | HEIGHT: 66 IN | DIASTOLIC BLOOD PRESSURE: 79 MMHG | HEART RATE: 68 BPM

## 2019-01-10 DIAGNOSIS — G47.33 OBSTRUCTIVE SLEEP APNEA: Primary | ICD-10-CM

## 2019-01-10 DIAGNOSIS — E66.9 OBESITY (BMI 30-39.9): ICD-10-CM

## 2019-01-10 PROCEDURE — 99203 PR OFFICE/OUTPT VISIT, NEW, LEVL III, 30-44 MIN: ICD-10-PCS | Mod: S$GLB,,, | Performed by: NURSE PRACTITIONER

## 2019-01-10 PROCEDURE — 99999 PR PBB SHADOW E&M-EST. PATIENT-LVL IV: CPT | Mod: PBBFAC,,, | Performed by: NURSE PRACTITIONER

## 2019-01-10 PROCEDURE — 99203 OFFICE O/P NEW LOW 30 MIN: CPT | Mod: S$GLB,,, | Performed by: NURSE PRACTITIONER

## 2019-01-10 PROCEDURE — 3008F PR BODY MASS INDEX (BMI) DOCUMENTED: ICD-10-PCS | Mod: CPTII,S$GLB,, | Performed by: NURSE PRACTITIONER

## 2019-01-10 PROCEDURE — 99999 PR PBB SHADOW E&M-EST. PATIENT-LVL IV: ICD-10-PCS | Mod: PBBFAC,,, | Performed by: NURSE PRACTITIONER

## 2019-01-10 PROCEDURE — 3008F BODY MASS INDEX DOCD: CPT | Mod: CPTII,S$GLB,, | Performed by: NURSE PRACTITIONER

## 2019-01-10 NOTE — LETTER
January 10, 2019      Zahra Bautista NP  [address] [facility] [place] [address]           [facility]  [address]  [facility] [address]  Phone: [phone]          Patient: Juan Carlos Turner   MR Number: [id number]   YOB: 1975   Date of Visit: 1/10/2019       Dear Zahra Bautista:    Thank you for referring Juan Carlos Turner to me for evaluation. Attached you will find relevant portions of my assessment and plan of care.    If you have questions, please do not hesitate to call me. I look forward to following Juan Carlos Turner along with you.    Sincerely,    Paula Guerra NP    Enclosure  CC:  No Recipients    If you would like to receive this communication electronically, please contact [email or web address] or [phone] to request more information on [facility] access.    For providers and/or their staff who would like to refer a patient to [facility], please contact us through our one-stop-shop provider referral line, [facility], at [phone].    If you feel you have received this communication in error or would no longer like to receive these types of communications, please e-mail [email or web address]

## 2019-01-10 NOTE — PATIENT INSTRUCTIONS
[facility] will call you regarding  in approximately 4 - 7 business days. If you have not heard from them after that time frame, please call them directly at [phone] to follow-up     After initial set up of CPAP, you have a 30-day window to exchange your first mask at no additional cost.     After obtaining your CPAP machine, please make a follow-up appointment to see me back in Sleep Clinic approximately 5 weeks from the date you received your machine. This appointment is recommended no sooner than 31 days from set up date, but no later than 90 days.

## 2019-01-10 NOTE — PROGRESS NOTES
"Juan Carlos Turner  was seen as a new patient at the request of  Zahra Bautista NP for the evaluation of obstructive sleep apnea.    CHIEF COMPLAINT:    Chief Complaint   Patient presents with    Sleep Apnea       01/10/2019 YOANDY Guerra NP: Initial HISTORY OF PRESENT ILLNESS: Juan Carlos Turner is a 43 y.o. female is here for sleep evaluation.       Patient complaints include: snoring, interrupted sleep, unrefreshing sleep, daytime fatigue, and sleepiness.   Discussed results  Pt ready to try CPAP    10/22/2018 checked in 11:50 for 11:20 am NP appt; 09/11/2018 HST AHI 13, RDI 42, low O2 82.6% % time < 90% SpO2 0.3%; "pt told Jeremías she came today just to get machine.     Denies symptoms of restless legs or kicking during sleep.    EPWORTH SLEEPINESS SCALE 1/10/2019   Sitting and reading 3   Watching TV 3   Sitting, inactive in a public place (e.g. a theatre or a meeting) 3   As a passenger in a car for an hour without a break 3   Lying down to rest in the afternoon when circumstances permit 3   Sitting and talking to someone 3   Sitting quietly after a lunch without alcohol 3   In a car, while stopped for a few minutes in traffic 3   Total score 24       SLEEP ROUTINE:  Sleep Clinic New Patient 1/10/2019   What time do you go to bed on a week day? (Give a range) 1-2am   What time do you go to bed on a day off? (Give a range) 1-2am   How long does it take you to fall asleep? (Give a range) 5-10 mins   On average, how many times per night do you wake up? 3   How long does it take you to fall back into sleep? (Give a range) 30 mins-3 hrs   What time do you wake up to start your day on a week day? (Give a range) 5-6am   What time do you wake up to start your day on a day off? (Give a range) 5-6am   What time do you get out of bed? (Give a range) 5:20- 6:20am   On average, how many hours do you sleep? 5   On average, how many naps do you take per day? 3-5   Rate your sleep quality from 0 to 5 (0-poor, 5-great). 0   Have you " experienced:  Weight gain?   How much weight have you lost or gained (in lbs.) in the last year? 50lbs   On average, how many times per night do you go to the bathroom?  3   Have you ever had a sleep study/CPAP machine/surgery for sleep apnea? Yes   Have you ever had a CPAP machine for sleep apnea? No   Have you ever had surgery for sleep apnea? No          PAST MEDICAL HISTORY:    Active Ambulatory Problems     Diagnosis Date Noted    Allergic rhinitis due to pollen     Hematuria, microscopic     Moderate persistent asthma     SOB (shortness of breath)     Anemia     Fatigue     Unspecified abnormalities of breathing     Morbid (severe) obesity with alveolar hypoventilation     JOSUE (obstructive sleep apnea)     Lumbar disc herniation with radiculopathy 09/20/2018     Resolved Ambulatory Problems     Diagnosis Date Noted    No Resolved Ambulatory Problems     Past Medical History:   Diagnosis Date    Anxiety     Diabetes mellitus     Hypertension                 PAST SURGICAL HISTORY:    Past Surgical History:   Procedure Laterality Date    HYSTERECTOMY      Injection, Steroid, Epidural LUMBAR L4/5 TESS N/A 10/23/2018    Performed by Ed [facility]ina [facility] at [facility]         FAMILY HISTORY:                History reviewed. No pertinent family history.    SOCIAL HISTORY:          Tobacco:   Social History     Tobacco Use   Smoking Status Never Smoker       Alcohol use:    Social History     Substance and Sexual Activity   Alcohol Use Yes    Comment: social                 ALLERGIES:  Review of patient's allergies indicates:  No Known Allergies    CURRENT MEDICATIONS:    Current Outpatient Medications   Medication Sig Dispense Refill    albuterol (PROVENTIL) 2.5 mg /3 mL (0.083 %) nebulizer solution   3    amlodipine (NORVASC) 10 MG tablet Take 10 mg by mouth once daily.      atorvastatin (LIPITOR) 80 MG tablet       escitalopram oxalate (LEXAPRO) 20 MG tablet Take 20 mg by mouth once daily.    "   fluticasone (FLONASE) 50 mcg/actuation nasal spray SPRAY ONCE IEN ONCE D  0    hydrocodone-chlorpheniramine (TUSSIONEX) 10-8 mg/5 mL suspension TK 10 ML PO Q 12 H PRN FOR COUGH FOR UP TO 10 DAYS  0    metformin (GLUMETZA) 500 MG (MOD) 24 hr tablet Take 1,000 mg by mouth 2 (two) times daily with meals.       ONETOUCH DELICA LANCETS 33 gauge Misc TEST ONCE D  3    ONETOUCH ULTRA BLUE TEST STRIP Strp TEST ONCE D BEFORE BREAKFAST  3    ONETOUCH ULTRAMINI kit UTD  0    sertraline (ZOLOFT) 100 MG tablet       VENTOLIN HFA 90 mcg/actuation inhaler        No current facility-administered medications for this visit.                   REVIEW OF SYSTEMS:     Sleep related symptoms as per HPI.  Sleep Clinic ROS  1/10/2019   Difficulty breathing through the nose?  Yes   Sore throat or dry mouth in the morning? Yes   Irregular or very fast heart beat?  Sometimes   Shortness of breath?  Yes   Acid reflux? No   Body aches and pains?  Yes   Morning headaches? Yes   Dizziness? Yes   Mood changes?  Sometimes   Do you exercise?  No   Do you feel like moving your legs a lot?  Yes       Otherwise, a balance of 10 systems reviewed is negative.          PHYSICAL EXAM:  Vitals:    01/10/19 0953   BP: 114/79   Pulse: 68   Weight: 112 kg (246 lb 14.6 oz)   Height: 5' 6" (1.676 m)   PainSc:   6   PainLoc: Knee     Body mass index is 39.85 kg/m².     GENERAL: Obese development, well groomed  NECK: Supple.  No thyromegaly. No palpable nodes.    SKIN: On face and neck: No abrasions, no rashes, no lesions.  No subcutaneous nodules are palpable.  RESPIRATORY:   Normal chest expansion and non-labored breathing at rest.  CARDIOVASCULAR: Normal rate  EXTREMITIES: No edema. No clubbing. No cyanosis. Station normal. Gait normal.        NEURO/PSYCH: Oriented to time, place and person. Normal attention span and concentration. Affect is full. Mood is normal.                                              ASSESSMENT:    JOSUE, mild AHI, severe RDI. " The patient symptomatically has snoring, interrupted sleep, unrefreshing sleep, and daytime fatigue with findings of crowded oral airway and elevated body mass index. Medical co-morbidities:asthma and obesity.  This warrants treatment for obstructive sleep apnea.      Obesity, BMI > 35, discussed relationship between JOSUE and weight     PLAN:    Education: During our discussion today, we talked about the etiology of obstructive sleep apnea as well as the potential ramifications of untreated sleep apnea, which could include daytime sleepiness, hypertension, heart disease and/or stroke. We discussed potential treatment options, which could include weight loss, body positioning, continuous positive airway pressure (CPAP), OA, EPAP, or referral for surgical consideration.     Treatment:  -trial APAP 6 - 20 cm @ [facility]. RTC 31 - 90 days after PAP . Pt to make f/u appt approximately 5 weeks from set up date either via [facility] or by contacting Sleep Clinic  -If patient has difficulty with CPAP adherence or ongoing JOSUE symptoms despite CPAP adherence, then consider an in-lab titration sleep study in order to determine optimal fixed CPAP setting.  -Counseling regarding benefits of healthy eating and physical activity (150 minutes of moderate-intensity aerobic activity per week) for weight reduction which can improve overall health.     Precautions: The patient was advised to abstain from driving should they feel sleepy  or drowsy.     Thank you for allowing me the opportunity to participate in the care of your patient.

## 2019-01-15 ENCOUNTER — OFFICE VISIT (OUTPATIENT)
Dept: PULMONOLOGY | Facility: CLINIC | Age: 44
End: 2019-01-15
Payer: MEDICARE

## 2019-01-15 VITALS
BODY MASS INDEX: 40.39 KG/M2 | DIASTOLIC BLOOD PRESSURE: 78 MMHG | OXYGEN SATURATION: 98 % | WEIGHT: 251.31 LBS | SYSTOLIC BLOOD PRESSURE: 122 MMHG | HEIGHT: 66 IN | HEART RATE: 75 BPM

## 2019-01-15 DIAGNOSIS — E66.2 MORBID (SEVERE) OBESITY WITH ALVEOLAR HYPOVENTILATION: Primary | ICD-10-CM

## 2019-01-15 DIAGNOSIS — D64.9 ANEMIA, UNSPECIFIED TYPE: ICD-10-CM

## 2019-01-15 DIAGNOSIS — G47.33 OSA (OBSTRUCTIVE SLEEP APNEA): ICD-10-CM

## 2019-01-15 PROCEDURE — 99999 PR PBB SHADOW E&M-EST. PATIENT-LVL III: ICD-10-PCS | Mod: PBBFAC,,, | Performed by: INTERNAL MEDICINE

## 2019-01-15 PROCEDURE — 3008F BODY MASS INDEX DOCD: CPT | Mod: CPTII,S$GLB,, | Performed by: INTERNAL MEDICINE

## 2019-01-15 PROCEDURE — 3008F PR BODY MASS INDEX (BMI) DOCUMENTED: ICD-10-PCS | Mod: CPTII,S$GLB,, | Performed by: INTERNAL MEDICINE

## 2019-01-15 PROCEDURE — 99213 OFFICE O/P EST LOW 20 MIN: CPT | Mod: S$GLB,,, | Performed by: INTERNAL MEDICINE

## 2019-01-15 PROCEDURE — 99213 PR OFFICE/OUTPT VISIT, EST, LEVL III, 20-29 MIN: ICD-10-PCS | Mod: S$GLB,,, | Performed by: INTERNAL MEDICINE

## 2019-01-15 PROCEDURE — 99999 PR PBB SHADOW E&M-EST. PATIENT-LVL III: CPT | Mod: PBBFAC,,, | Performed by: INTERNAL MEDICINE

## 2019-01-16 ENCOUNTER — TELEPHONE (OUTPATIENT)
Dept: SLEEP MEDICINE | Facility: CLINIC | Age: 44
End: 2019-01-16

## 2019-01-16 NOTE — TELEPHONE ENCOUNTER
----- Message from Duong Nava sent at 1/16/2019  8:37 AM CST -----  Contact: pt  Name of Who is Calling: pt      What is the request in detail: states she was told by staff that she would be contacted by company for her Cpap machine. Pt states she is unable to sleep. Pt states she have not received a call as of yet      Can the clinic reply by [facility]: no      What Number to Call Back if not [facility]: [phone]

## 2019-01-16 NOTE — PROGRESS NOTES
Subjective:       Patient ID: Juan Carlos Turner is a 43 y.o. female.    Chief Complaint: Shortness of Breath    HPI   Juan Carlos Turner 43 y.o. female    has a past medical history of Anxiety, Diabetes mellitus, and Hypertension.    has a past surgical history that includes Hysterectomy and epidural steroid injection (N/A, 10/23/2018).   reports that  has never smoked. She does not have any smokeless tobacco history on file. She reports that she drinks alcohol. She reports that she does not use drugs.  Referred by: No ref. provider found  Who had concerns including Shortness of Breath.  The patient's last visit with me was on 7/31/2018.    Here for follow up of studies  Feeling well, no issues except waking up choking  All workup negative except + sleep study severe RDI  Waiting for cpap machine    No fever chills, ns, wt changes, nausea, vomiting, diarrhea, constipation, chest pain, tightness, pressure. Remainder of review of systems is negative.  Otherwise asymptomatic from pulmonary standpoint.    Review of Systems   All other systems reviewed and are negative.      Objective:      Physical Exam   Vitals reviewed.    Personal Diagnostic Review    No flowsheet data found.      Assessment:       1. Morbid (severe) obesity with alveolar hypoventilation    2. JOSUE (obstructive sleep apnea)    3. Anemia, unspecified type        Outpatient Encounter Medications as of 1/15/2019   Medication Sig Dispense Refill    amlodipine (NORVASC) 10 MG tablet Take 10 mg by mouth once daily.      atorvastatin (LIPITOR) 80 MG tablet       escitalopram oxalate (LEXAPRO) 20 MG tablet Take 20 mg by mouth once daily.      fluticasone (FLONASE) 50 mcg/actuation nasal spray SPRAY ONCE IEN ONCE D  0    hydrocodone-chlorpheniramine (TUSSIONEX) 10-8 mg/5 mL suspension TK 10 ML PO Q 12 H PRN FOR COUGH FOR UP TO 10 DAYS  0    metformin (GLUMETZA) 500 MG (MOD) 24 hr tablet Take 1,000 mg by mouth 2 (two) times daily with meals.        ONETOUCH DELICA LANCETS 33 gauge Misc TEST ONCE D  3    ONETOUCH ULTRA BLUE TEST STRIP Strp TEST ONCE D BEFORE BREAKFAST  3    ONETOUCH ULTRAMINI kit UTD  0    sertraline (ZOLOFT) 100 MG tablet       albuterol (PROVENTIL) 2.5 mg /3 mL (0.083 %) nebulizer solution   3    VENTOLIN HFA 90 mcg/actuation inhaler        No facility-administered encounter medications on file as of 1/15/2019.      No orders of the defined types were placed in this encounter.      Plan:             I personally reviewed the      1. PFT wnl  2. 6MWD wnl  3. CXR   4. CXR report   5. CT chest wnl  6. CT chest report     Assessment:  Juan Carlos was seen today for shortness of breath.    Diagnoses and all orders for this visit:    Morbid (severe) obesity with alveolar hypoventilation    JOSUE (obstructive sleep apnea)    Anemia, unspecified type        Plan:  Problem List Items Addressed This Visit     Anemia    Overview     Start iron bid with stool softener         Morbid (severe) obesity with alveolar hypoventilation - Primary    JOSUE (obstructive sleep apnea)    Overview     Per sleep clinic                   Follow-up if symptoms worsen or fail to improve.    Patient Instructions   Start iron 325mg twice a day- and take with a stool softener like dulcolax (generic)        There is no immunization history on file for this patient.

## 2019-01-30 ENCOUNTER — OFFICE VISIT (OUTPATIENT)
Dept: SPINE | Facility: CLINIC | Age: 44
End: 2019-01-30
Attending: ANESTHESIOLOGY
Payer: MEDICARE

## 2019-01-30 ENCOUNTER — LAB VISIT (OUTPATIENT)
Dept: LAB | Facility: OTHER | Age: 44
End: 2019-01-30
Attending: ANESTHESIOLOGY
Payer: MEDICARE

## 2019-01-30 VITALS
SYSTOLIC BLOOD PRESSURE: 124 MMHG | HEIGHT: 66 IN | DIASTOLIC BLOOD PRESSURE: 76 MMHG | HEART RATE: 83 BPM | WEIGHT: 249 LBS | BODY MASS INDEX: 40.02 KG/M2 | TEMPERATURE: 96 F

## 2019-01-30 DIAGNOSIS — M51.16 LUMBAR DISC HERNIATION WITH RADICULOPATHY: ICD-10-CM

## 2019-01-30 DIAGNOSIS — I73.9 VASCULAR CLAUDICATION: ICD-10-CM

## 2019-01-30 DIAGNOSIS — E11.42 TYPE 2 DIABETES MELLITUS WITH PERIPHERAL NEUROPATHY: ICD-10-CM

## 2019-01-30 DIAGNOSIS — M51.16 LUMBAR DISC HERNIATION WITH RADICULOPATHY: Primary | ICD-10-CM

## 2019-01-30 DIAGNOSIS — I73.9 PVD (PERIPHERAL VASCULAR DISEASE) WITH CLAUDICATION: ICD-10-CM

## 2019-01-30 LAB
ESTIMATED AVG GLUCOSE: 148 MG/DL
HBA1C MFR BLD HPLC: 6.8 %

## 2019-01-30 PROCEDURE — 3008F PR BODY MASS INDEX (BMI) DOCUMENTED: ICD-10-PCS | Mod: CPTII,S$GLB,, | Performed by: ANESTHESIOLOGY

## 2019-01-30 PROCEDURE — 83036 HEMOGLOBIN GLYCOSYLATED A1C: CPT

## 2019-01-30 PROCEDURE — 99214 PR OFFICE/OUTPT VISIT, EST, LEVL IV, 30-39 MIN: ICD-10-PCS | Mod: S$GLB,,, | Performed by: ANESTHESIOLOGY

## 2019-01-30 PROCEDURE — 3008F BODY MASS INDEX DOCD: CPT | Mod: CPTII,S$GLB,, | Performed by: ANESTHESIOLOGY

## 2019-01-30 PROCEDURE — 99999 PR PBB SHADOW E&M-EST. PATIENT-LVL IV: CPT | Mod: PBBFAC,,, | Performed by: ANESTHESIOLOGY

## 2019-01-30 PROCEDURE — 36415 COLL VENOUS BLD VENIPUNCTURE: CPT

## 2019-01-30 PROCEDURE — 99999 PR PBB SHADOW E&M-EST. PATIENT-LVL IV: ICD-10-PCS | Mod: PBBFAC,,, | Performed by: ANESTHESIOLOGY

## 2019-01-30 PROCEDURE — 99214 OFFICE O/P EST MOD 30 MIN: CPT | Mod: S$GLB,,, | Performed by: ANESTHESIOLOGY

## 2019-01-30 RX ORDER — PREGABALIN 75 MG/1
CAPSULE ORAL
Qty: 60 CAPSULE | Refills: 2 | Status: SHIPPED | OUTPATIENT
Start: 2019-01-30 | End: 2019-05-08

## 2019-01-30 NOTE — PROGRESS NOTES
Chronic Pain - Established Visit    Referring Physician: No ref. provider found    Chief Complaint:   No chief complaint on file.       SUBJECTIVE: Disclaimer: This note has been generated using voice-recognition software. There may be typographical errors that have been missed during proof-reading  Interval history 01/30/2019:  Since previous encounter the patient is at significant pain previous epidural injection did not help but MRI imaging did not reveal any severe stenosis or nerve impingement.  The question of a disc tear at L1-2 but overall facet arthropathy.  There is no evidence of instability with flexion extension, the patient has never tried Lyrica in the past.  Vascular studies were ordered including a ultrasound of the venous system and resting ankle-brachial index but these were not performed back in November of 2018.  Interval History 11/16/2018:  The patient presents for injection follow up.  She is s/p L4-5 IL TESS.  She reports benefit only for the day of the procedure.  She continues to report pain across the lower back.  She is pointing a bit higher today.  She is also having pain down the back of both legs and the front of the right leg to the knee.  She has had multiple knee scopes in the past.  She has had treatment at both [place] and [facility] in the past.  We were able to obtain documentation from [facility].  She reports being told that her problem may be vascular in nature.  She would like me to refer her to a vascular doctor here at [facility].  She does feel as though her right leg swelling sometimes.  She has tried Gabapentin, Lyrica and Cymbalta in the past with limited benefit.  She feels as though opioid medications have provided her the most benefit in the past.  Her pain today is 8/10.    Interval History 10/3/2018:  The patient returns for follow up and imaging review.  She continues to report pain across the lower back.  She reports radiation down there posterolateral aspect of both  legs, right greater than left with accompanied numbness.  She also reports pain and numbness to both upper extremities.  She denies any significant weakness.  She reports no relief with recent steroid pack.  She has been taking [place] from [facility] and requests a refill of this.  Her recent MRI shows disc bulges with mild NF narrowing.  Her pain today is 10/10.  The patient denies any bowel or bladder incontinence or signs of saddle paresthesia.  The patient denies any major medical changes since last office visit.    Initial encounter:    Juan Carlos Turner presents to the clinic for the evaluation of lower back pain. The pain started 3 years ago and symptoms have been worsening.    Brief history:  Previous MRI 1 year ago, stated to have disk herniations, but imaging not brought with the patient today    Pain Description:    The pain is located in the lower back area and radiates to the legs and buttock.      At BEST  10/10     At WORST  10/10 on the WORST day.      On average pain is rated as 10/10.     Today the pain is rated as 10/10    The pain is described as shooting, throbbing and tight band      Symptoms interfere with daily activity, sleeping and work.     Exacerbating factors: Sitting, Standing, Laying, Walking, Night Time, Morning, Lifting and Getting out of bed/chair.      Mitigating factors medications and physical therapy.     Patient denies night fever/night sweats, urinary incontinence, bowel incontinence, significant weight loss, significant motor weakness and loss of sensations.  Patient denies any suicidal or homicidal ideations    Pain Medications:  Current:  Tylenol    Tried in Past:  NSAIDs -Aspirin  TCA -Never  SNRI  Cymbalta  Anti-convulsants - gabapentin and Lyrica- side effects  Muscle Relaxants - flexeril and robaxin (no help)  Opioids-tramadol and norco    Physical Therapy/Home Exercise: yes       report:  Reviewed and consistent with medication use as prescribed.    Pain Procedures:    10/23/18 L4/5 IL TESS- no relief    Chiropractor -never  Acupuncture - never  TENS unit -never  Spinal decompression -never  Joint replacement -never, previous right knee arthroscopy    Imaging:      Narrative     EXAMINATION:  MRI LUMBAR SPINE WITHOUT CONTRAST    CLINICAL HISTORY:  lower back pain with bilateral lower extremity radiculopathy; Intervertebral disc disorders with radiculopathy, lumbar region    TECHNIQUE:  Multiplanar, multisequence MR images were acquired from the thoracolumbar junction to the sacrum without the administration of contrast.    COMPARISON:  Same-day radiograph    FINDINGS:  Alignment: Normal.    Vertebrae: Normal marrow signal. No fracture.    Discs: Normal height and signal.    Cord: Normal.  Conus terminates at L1-L2    Degenerative findings:    T12-L1: Unremarkable.    L1-L2: Unremarkable.    L2-L3: Minimal circumferential disc bulge.  No compressive sequelae.    L3-L4: Circumferential disc bulge and minimal facet hypertrophy.  Mild bilateral neural foraminal narrowing.  No canal stenosis.    L4-L5: Circumferential disc bulge and mild facet hypertrophy.  Mild bilateral neural foraminal narrowing.  No canal stenosis.    L5-S1: Minimal circumferential disc bulge without compressive sequelae.    Paraspinal muscles & soft tissues: Unremarkable.      Impression       Mild degenerative change at L3-4 and L4-5 as above.     Narrative     EXAMINATION:  XR LUMBAR SPINE 5 VIEW WITH FLEX AND EXT    CLINICAL HISTORY:  evaluate for facet arthropathy and r/o instability;  Intervertebral disc disorders with radiculopathy, lumbar region    TECHNIQUE:  Five views of the lumbar spine plus flexion extension views were performed.    COMPARISON:  None.    FINDINGS:  Five non-rib-bearing lumbar type vertebral bodies.  No acute fracture.  Alignment is maintained.  No instability.  No facet arthropathy.  No significant intervertebral disc height loss by radiograph.      Impression       No significant  degenerative findings or instability.           Past Medical History:   Diagnosis Date    Anxiety     Diabetes mellitus     Hypertension      Past Surgical History:   Procedure Laterality Date    HYSTERECTOMY      Injection, Steroid, Epidural LUMBAR L4/5 TESS N/A 10/23/2018    Performed by Ed Pina MD at [facility]     Social History     Socioeconomic History    Marital status: Single     Spouse name: Not on file    Number of children: Not on file    Years of education: Not on file    Highest education level: Not on file   Social Needs    Financial resource strain: Not on file    Food insecurity - worry: Not on file    Food insecurity - inability: Not on file    Transportation needs - medical: Not on file    Transportation needs - non-medical: Not on file   Occupational History    Not on file   Tobacco Use    Smoking status: Never Smoker   Substance and Sexual Activity    Alcohol use: Yes     Comment: social    Drug use: No    Sexual activity: Not on file   Other Topics Concern    Not on file   Social History Narrative    Not on file     No family history on file.    Review of patient's allergies indicates:  No Known Allergies    Current Outpatient Medications   Medication Sig    albuterol (PROVENTIL) 2.5 mg /3 mL (0.083 %) nebulizer solution     amlodipine (NORVASC) 10 MG tablet Take 10 mg by mouth once daily.    atorvastatin (LIPITOR) 80 MG tablet     escitalopram oxalate (LEXAPRO) 20 MG tablet Take 20 mg by mouth once daily.    fluticasone (FLONASE) 50 mcg/actuation nasal spray SPRAY ONCE IEN ONCE D    hydrocodone-chlorpheniramine (TUSSIONEX) 10-8 mg/5 mL suspension TK 10 ML PO Q 12 H PRN FOR COUGH FOR UP TO 10 DAYS    metformin (GLUMETZA) 500 MG (MOD) 24 hr tablet Take 1,000 mg by mouth 2 (two) times daily with meals.     ONETOUCH DELICA LANCETS 33 gauge Misc TEST ONCE D    ONETOUCH ULTRA BLUE TEST STRIP Strp TEST ONCE D BEFORE BREAKFAST    ONETOUCH ULTRAMINI kit UTD  "   sertraline (ZOLOFT) 100 MG tablet     VENTOLIN HFA 90 mcg/actuation inhaler      No current facility-administered medications for this visit.        REVIEW OF SYSTEMS:    GENERAL:  No weight loss, malaise or fevers.  HEENT:   No recent changes in vision or hearing  NECK:  Negative for lumps, no difficulty with swallowing.  RESPIRATORY:  Negative for cough, wheezing or shortness of breath, patient denies any recent URI.  CARDIOVASCULAR:  Negative for chest pain, leg swelling or palpitations.  GI:  Negative for abdominal discomfort, blood in stools or black stools or change in bowel habits.  MUSCULOSKELETAL:  See HPI.  SKIN:  Negative for lesions, rash, and itching.  PSYCH:  No mood disorder or recent psychosocial stressors.  Patient'sleep is disturbed secondary to pain.  HEMATOLOGY/LYMPHOLOGY:  Negative for prolonged bleeding, bruising easily or swollen nodes.  Patient is not currently taking any anti-coagulants  ENDO: DM2  NEURO:   No history of headaches, syncope, paralysis, seizures or tremors.  All other reviewed and negative other than HPI.    OBJECTIVE:    /76   Pulse 83   Temp 96.3 °F (35.7 °C)   Ht 5' 6" (1.676 m)   Wt 112.9 kg (249 lb)   BMI 40.19 kg/m²     PHYSICAL EXAMINATION:    GENERAL: Well appearing, in no acute distress, alert and oriented x3.  PSYCH:  Mood and affect appropriate.  SKIN: Skin color, texture, turgor normal, no rashes or lesions.   HEAD/FACE:  Normocephalic, atraumatic. Cranial nerves grossly intact.  CV: RRR with palpation of the radial artery.  PULM: No evidence of respiratory difficulty, symmetric chest rise.  BACK: Negative straight leg raising. There is pain with palpation over the facet joints of the thoracic and lumbar spine bilaterally. There is decreased range of motion with extension to 15 degrees, and facet loading maneuvers cause reproducible pain.    EXTREMITIES: Peripheral joint ROM is full and pain free without obvious instability or laxity in all four " extremities. No deformities, edema, or skin discoloration. Good capillary refill.  MUSCULOSKELETAL:  Pain with extension of right knee.  There is pain with palpation over the sacroiliac joints bilaterally.  There is no pain to palpation over the greater trochanteric bursa bilaterally.  5/5 strength in right ankle with plantar and dorsiflexion. 5/5 strength in left ankle with plantar and dorsiflexion. 5/5 strength with right knee flexion and extension. 5/5 strength with left knee flexion and extension. 5/5 strength in right EHL, 5/5 strength in left EHL.  No atrophy or tone abnormalities are noted.  NEURO: Bilateral lower extremity coordination and muscle stretch reflexes are physiologic and symmetric.  Plantar response are downgoing. No clonus.  No loss of sensation is noted.  GAIT: Normal.    ASSESSMENT: 43 y.o. year old female with lower back and BLE pain, consistent with     Encounter Diagnoses   Name Primary?    Lumbar disc herniation with radiculopathy Yes    Vascular claudication     Type 2 diabetes mellitus with peripheral neuropathy     PVD (peripheral vascular disease) with claudication        PLAN:     - Recent lumbar MRI and XRAYs reviewed.    -reordered ankle brachial index to evaluate for vascular claudication/peripheral vascular disease  continue follow-up with vascular surgery     - discontinue use of gabapentin start Lyrica 75 mg at night for 3 days then increase to b.i.d., in the future this can be escalated further    - She requested Norco or Tramadol previously and this was not recommended.    - She has an [facility] at [facility] which she may see again.    - She declined PT.    - RTC in 1 month or sooner if needed.     -HbA1c    -if there is no vascular etiology given her lack of response to epidural injection and also no surgical problem on imaging, we will consider spinal cord stimulation trial with Abbott vs PDN study with Jade    Greater than 25 minutes spent in total in todays visit with the  patient, with more than half that time direct face to face counseling and education with the patient today. We discussed the disease process, prognosis, treatment plan, and risks and benefits.     Ed Pina    01/30/2019

## 2019-02-04 ENCOUNTER — HOSPITAL ENCOUNTER (OUTPATIENT)
Dept: VASCULAR SURGERY | Facility: CLINIC | Age: 44
Discharge: HOME | End: 2019-02-04
Attending: ANESTHESIOLOGY
Payer: MEDICARE

## 2019-02-04 DIAGNOSIS — I73.9 VASCULAR CLAUDICATION: ICD-10-CM

## 2019-02-04 DIAGNOSIS — M51.16 LUMBAR DISC HERNIATION WITH RADICULOPATHY: ICD-10-CM

## 2019-02-04 DIAGNOSIS — I73.9 PVD (PERIPHERAL VASCULAR DISEASE) WITH CLAUDICATION: ICD-10-CM

## 2019-02-04 DIAGNOSIS — E11.42 TYPE 2 DIABETES MELLITUS WITH PERIPHERAL NEUROPATHY: ICD-10-CM

## 2019-02-04 PROCEDURE — 93923 PR NON-INVASIVE PHYSIOLOGIC STUDY EXTREMITY 3 LEVELS: ICD-10-PCS | Mod: S$GLB,,, | Performed by: SURGERY

## 2019-02-04 PROCEDURE — 93923 UPR/LXTR ART STDY 3+ LVLS: CPT | Mod: S$GLB,,, | Performed by: SURGERY

## 2019-02-08 ENCOUNTER — TELEPHONE (OUTPATIENT)
Dept: PAIN MEDICINE | Facility: CLINIC | Age: 44
End: 2019-02-08

## 2019-02-14 ENCOUNTER — OFFICE VISIT (OUTPATIENT)
Dept: SLEEP MEDICINE | Facility: CLINIC | Age: 44
End: 2019-02-14
Payer: MEDICARE

## 2019-02-14 VITALS
WEIGHT: 246.69 LBS | BODY MASS INDEX: 39.65 KG/M2 | DIASTOLIC BLOOD PRESSURE: 80 MMHG | HEIGHT: 66 IN | HEART RATE: 78 BPM | SYSTOLIC BLOOD PRESSURE: 124 MMHG

## 2019-02-14 DIAGNOSIS — E66.9 OBESITY (BMI 30.0-34.9): ICD-10-CM

## 2019-02-14 DIAGNOSIS — G47.33 OSA (OBSTRUCTIVE SLEEP APNEA): Primary | ICD-10-CM

## 2019-02-14 PROCEDURE — 3008F BODY MASS INDEX DOCD: CPT | Mod: CPTII,S$GLB,, | Performed by: NURSE PRACTITIONER

## 2019-02-14 PROCEDURE — 99999 PR PBB SHADOW E&M-EST. PATIENT-LVL IV: CPT | Mod: PBBFAC,,, | Performed by: NURSE PRACTITIONER

## 2019-02-14 PROCEDURE — 99214 PR OFFICE/OUTPT VISIT, EST, LEVL IV, 30-39 MIN: ICD-10-PCS | Mod: S$GLB,,, | Performed by: NURSE PRACTITIONER

## 2019-02-14 PROCEDURE — 3008F PR BODY MASS INDEX (BMI) DOCUMENTED: ICD-10-PCS | Mod: CPTII,S$GLB,, | Performed by: NURSE PRACTITIONER

## 2019-02-14 PROCEDURE — 99999 PR PBB SHADOW E&M-EST. PATIENT-LVL IV: ICD-10-PCS | Mod: PBBFAC,,, | Performed by: NURSE PRACTITIONER

## 2019-02-14 PROCEDURE — 99214 OFFICE O/P EST MOD 30 MIN: CPT | Mod: S$GLB,,, | Performed by: NURSE PRACTITIONER

## 2019-02-14 RX ORDER — OXYBUTYNIN CHLORIDE 5 MG/1
TABLET ORAL
COMMUNITY
Start: 2019-01-30 | End: 2019-05-08

## 2019-02-14 RX ORDER — DULOXETIN HYDROCHLORIDE 60 MG/1
CAPSULE, DELAYED RELEASE ORAL
COMMUNITY
Start: 2019-01-30 | End: 2019-02-14

## 2019-02-14 RX ORDER — GLIPIZIDE 5 MG/1
TABLET ORAL
COMMUNITY
Start: 2019-01-30 | End: 2019-05-08

## 2019-02-14 RX ORDER — METFORMIN HYDROCHLORIDE 1000 MG/1
1000 TABLET ORAL
COMMUNITY
Start: 2019-01-30

## 2019-02-14 NOTE — PROGRESS NOTES
"Juan Carlos Turner  was seen in follow-up for JOSUE management and CPAP equipment check.     CHIEF COMPLAINT:    Chief Complaint   Patient presents with    Sleep Apnea     INTERVAL HISTORY:    02/14/2019 YOANDY Guerra NP: Pt returns after set up of PAP machine on 01/18/2019 @ [facility]. Pt sleep complaints of snoring, interrupted sleep, unrefreshing sleep, daytime fatigue, and sleepiness resolves with PAP use. Denies oral/nasal drying with AmaraView FF mask. Denies mask leaks. Denies rainout.  Denies pressure intolerance or air hunger. Denies congestion. Denies aerophagia.     Some nights she does not use machine because she sleeps some place else. Didn't know she can unplug machine and travel with it.     Dreamstation APAP 6 - 20 cm, 01/17/2019 - 02/13/2019, 21 days, > 4 hours: 60.7%, Ave AHI: 1.6, Ave LL: 1.9%, Ave PB: 0.1%, 90%tile pressure: 9.8 cm      01/10/2019 YOANDY Guerra NP: Initial HISTORY OF PRESENT ILLNESS: Juan Carlos Turner is a 43 y.o. female is here for sleep evaluation.       Patient complaints include: snoring, interrupted sleep, unrefreshing sleep, daytime fatigue, and sleepiness.   Discussed results  Pt ready to try CPAP    10/22/2018 checked in 11:50 for 11:20 am NP appt; 09/11/2018 HST AHI 13, RDI 42, low O2 82.6% % time < 90% SpO2 0.3%; "pt told Jeremías she came today just to get machine.     Denies symptoms of restless legs or kicking during sleep.    EPWORTH SLEEPINESS SCALE 1/10/2019   Sitting and reading 3   Watching TV 3   Sitting, inactive in a public place (e.g. a theatre or a meeting) 3   As a passenger in a car for an hour without a break 3   Lying down to rest in the afternoon when circumstances permit 3   Sitting and talking to someone 3   Sitting quietly after a lunch without alcohol 3   In a car, while stopped for a few minutes in traffic 3   Total score 24       SLEEP ROUTINE:  Sleep Clinic New Patient 1/10/2019   What time do you go to bed on a week day? (Give a range) 1-2am   What time do you " go to bed on a day off? (Give a range) 1-2am   How long does it take you to fall asleep? (Give a range) 5-10 mins   On average, how many times per night do you wake up? 3   How long does it take you to fall back into sleep? (Give a range) 30 mins-3 hrs   What time do you wake up to start your day on a week day? (Give a range) 5-6am   What time do you wake up to start your day on a day off? (Give a range) 5-6am   What time do you get out of bed? (Give a range) 5:20- 6:20am   On average, how many hours do you sleep? 5   On average, how many naps do you take per day? 3-5   Rate your sleep quality from 0 to 5 (0-poor, 5-great). 0   Have you experienced:  Weight gain?   How much weight have you lost or gained (in lbs.) in the last year? 50lbs   On average, how many times per night do you go to the bathroom?  3   Have you ever had a sleep study/CPAP machine/surgery for sleep apnea? Yes   Have you ever had a CPAP machine for sleep apnea? No   Have you ever had surgery for sleep apnea? No          PAST MEDICAL HISTORY:    Active Ambulatory Problems     Diagnosis Date Noted    Allergic rhinitis due to pollen     Hematuria, microscopic     Moderate persistent asthma     Anemia     Fatigue     Unspecified abnormalities of breathing     Morbid (severe) obesity with alveolar hypoventilation     JOSUE (obstructive sleep apnea)     Lumbar disc herniation with radiculopathy 09/20/2018    Vascular claudication 01/30/2019    Type 2 diabetes mellitus with peripheral neuropathy 01/30/2019    PVD (peripheral vascular disease) with claudication 01/30/2019     Resolved Ambulatory Problems     Diagnosis Date Noted    SOB (shortness of breath)      Past Medical History:   Diagnosis Date    Anxiety     Diabetes mellitus     Hypertension                 PAST SURGICAL HISTORY:    Past Surgical History:   Procedure Laterality Date    HYSTERECTOMY      Injection, Steroid, Epidural LUMBAR L4/5 TESS N/A 10/23/2018    [facility]         FAMILY HISTORY:                No family history on file.    SOCIAL HISTORY:          Tobacco:   Social History     Tobacco Use   Smoking Status Never Smoker       Alcohol use:    Social History     Substance and Sexual Activity   Alcohol Use Yes    Comment: social                 ALLERGIES:  Review of patient's allergies indicates:  No Known Allergies    CURRENT MEDICATIONS:    Current Outpatient Medications   Medication Sig Dispense Refill    albuterol (PROVENTIL) 2.5 mg /3 mL (0.083 %) nebulizer solution   3    amlodipine (NORVASC) 10 MG tablet Take 10 mg by mouth once daily.      atorvastatin (LIPITOR) 80 MG tablet       escitalopram oxalate (LEXAPRO) 20 MG tablet Take 20 mg by mouth once daily.      fluticasone (FLONASE) 50 mcg/actuation nasal spray SPRAY ONCE IEN ONCE D  0    hydrocodone-chlorpheniramine (TUSSIONEX) 10-8 mg/5 mL suspension TK 10 ML PO Q 12 H PRN FOR COUGH FOR UP TO 10 DAYS  0    metformin (GLUMETZA) 500 MG (MOD) 24 hr tablet Take 1,000 mg by mouth 2 (two) times daily with meals.       ONETOUCH DELICA LANCETS 33 gauge Misc TEST ONCE D  3    ONETOUCH ULTRA BLUE TEST STRIP Strp TEST ONCE D BEFORE BREAKFAST  3    ONETOUCH ULTRAMINI kit UTD  0    pregabalin (LYRICA) 75 MG capsule Take 1 at night for 3 days, then increase to twice a day 60 capsule 2    sertraline (ZOLOFT) 100 MG tablet       VENTOLIN HFA 90 mcg/actuation inhaler        No current facility-administered medications for this visit.                   REVIEW OF SYSTEMS:     Sleep related symptoms as per HPI.  Sleep Clinic ROS  1/10/2019   Difficulty breathing through the nose?  Yes   Sore throat or dry mouth in the morning? Yes   Irregular or very fast heart beat?  Sometimes   Shortness of breath?  Yes   Acid reflux? No   Body aches and pains?  Yes   Morning headaches? Yes   Dizziness? Yes   Mood changes?  Sometimes   Do you exercise?  No   Do you feel like moving your legs a  "lot?  Yes       Otherwise, a balance of 10 systems reviewed is negative.          PHYSICAL EXAM:  /80   Pulse 78   Ht 5' 6" (1.676 m)   Wt 111.9 kg (246 lb 11.1 oz)   BMI 39.82 kg/m²   GENERAL: Obese development, well groomed                                            ASSESSMENT:    JOSUE, mild AHI, severe RDI. The patient symptomatically has snoring, interrupted sleep, unrefreshing sleep, and daytime fatigue improving with CPAP. The patient has not yet met minimum 70% usage immediately after set up but experiencing symptomatic benefit.  Medical co-morbidities:asthma and obesity.  This warrants treatment for obstructive sleep apnea.      Obesity, BMI > 35, discussed relationship between JOSUE and weight; contemplating/planning diet/exercise for weight loss     PLAN:    Treatment:  -increase mask on time w/ APAP 6-20 cm. Encouraged travelling with CPAP prn. RTC 9 weeks CPAP compliance.   -Reviewed insurance guidelines for adherence to therapy defined as use of PAP = 4 hours per night on 70% of nights during a consecutive 30-day period anytime during the first 3 months of initial usage.   -Counseling regarding benefits of healthy eating and physical activity (150 minutes of moderate-intensity aerobic activity per week) for weight reduction which can improve overall health.   -Referral to Medical Fitness Program     Education: During our discussion today, we talked about the etiology of obstructive sleep apnea as well as the potential ramifications of untreated sleep apnea, which could include daytime sleepiness, hypertension, heart disease and/or stroke. We discussed potential treatment options, which could include weight loss, body positioning, continuous positive airway pressure (CPAP), OA, EPAP, or referral for surgical consideration.     Precautions: The patient was advised to abstain from driving should they feel sleepy  or drowsy.           "

## 2019-03-06 ENCOUNTER — TELEPHONE (OUTPATIENT)
Dept: BARIATRICS | Facility: CLINIC | Age: 44
End: 2019-03-06

## 2019-03-06 NOTE — TELEPHONE ENCOUNTER
Called pt, pt stated she didn't know she had an appt. pt stated she doesn't have a car and will have to arrage transportation. rescheduled, confirmed time, date, and location. pt acknowledged.

## 2019-03-19 ENCOUNTER — TELEPHONE (OUTPATIENT)
Dept: BARIATRICS | Facility: CLINIC | Age: 44
End: 2019-03-19

## 2019-03-19 NOTE — TELEPHONE ENCOUNTER
Called pt, advised appt next week is just consult, no sx. will discuss program with md, confirmed time, date, and location. pt acknowledged.

## 2019-03-19 NOTE — TELEPHONE ENCOUNTER
----- Message from Steve Alfredo sent at 3/18/2019  4:52 PM CDT -----  Contact: Patient  Type: Needs Medical Advice    Who Called:  Patient  Best Call Back Number: [phone]  Additional Information: Patient would like to discuss upcoming appointment. Thanks!

## 2019-03-22 ENCOUNTER — TELEPHONE (OUTPATIENT)
Dept: PULMONOLOGY | Facility: CLINIC | Age: 44
End: 2019-03-22

## 2019-03-22 NOTE — TELEPHONE ENCOUNTER
----- Message from Chandler Baeza MD sent at 3/22/2019  9:47 AM CDT -----  Contact: [facility]   No, I do not need to see her. Workup is normal except sleep apnea.   RIMA Dumont  ----- Message -----  From: Dominique Hassan MA  Sent: 3/21/2019   1:23 PM  To: MD Dr Aryan Osborne,  Do you need to see this pt again? You saw pt in January.  Thanks, Dominique  ----- Message -----  From: Marianne Rodriguez  Sent: 3/21/2019  10:16 AM  To: Aryan [facility]EAL[facility] Staff    Hi Dr Baeza and staff,     Caroline GOODEN is referring this EST pt to see you for dx SOB    I am not able to schedule an appt.     Can you contact the pt to schedule?     The refrl, records are in media mgr     Thank you,  Marianne Rodriguez   [facility]    [id number]

## 2019-03-27 ENCOUNTER — OFFICE VISIT (OUTPATIENT)
Dept: BARIATRICS | Facility: CLINIC | Age: 44
End: 2019-03-27
Payer: MEDICARE

## 2019-03-27 VITALS
RESPIRATION RATE: 16 BRPM | HEART RATE: 77 BPM | DIASTOLIC BLOOD PRESSURE: 91 MMHG | HEIGHT: 66 IN | BODY MASS INDEX: 39.77 KG/M2 | SYSTOLIC BLOOD PRESSURE: 149 MMHG | TEMPERATURE: 98 F | WEIGHT: 247.44 LBS

## 2019-03-27 DIAGNOSIS — E61.1 IRON DEFICIENCY: ICD-10-CM

## 2019-03-27 DIAGNOSIS — E66.09 CLASS 2 OBESITY DUE TO EXCESS CALORIES WITHOUT SERIOUS COMORBIDITY WITH BODY MASS INDEX (BMI) OF 39.0 TO 39.9 IN ADULT: ICD-10-CM

## 2019-03-27 DIAGNOSIS — E66.01 MORBID OBESITY: Primary | ICD-10-CM

## 2019-03-27 DIAGNOSIS — G47.33 OSA (OBSTRUCTIVE SLEEP APNEA): ICD-10-CM

## 2019-03-27 DIAGNOSIS — E55.9 VITAMIN D DEFICIENCY: ICD-10-CM

## 2019-03-27 DIAGNOSIS — I10 HYPERTENSION, UNSPECIFIED TYPE: ICD-10-CM

## 2019-03-27 DIAGNOSIS — E03.9 HYPOTHYROIDISM, UNSPECIFIED TYPE: ICD-10-CM

## 2019-03-27 DIAGNOSIS — E53.8 FOLATE DEFICIENCY: ICD-10-CM

## 2019-03-27 DIAGNOSIS — E11.42 TYPE 2 DIABETES MELLITUS WITH PERIPHERAL NEUROPATHY: ICD-10-CM

## 2019-03-27 DIAGNOSIS — E53.8 B12 DEFICIENCY: ICD-10-CM

## 2019-03-27 PROBLEM — E66.812 CLASS 2 OBESITY DUE TO EXCESS CALORIES WITHOUT SERIOUS COMORBIDITY WITH BODY MASS INDEX (BMI) OF 39.0 TO 39.9 IN ADULT: Status: ACTIVE | Noted: 2019-03-27

## 2019-03-27 PROCEDURE — 3008F PR BODY MASS INDEX (BMI) DOCUMENTED: ICD-10-PCS | Mod: CPTII,S$GLB,, | Performed by: SURGERY

## 2019-03-27 PROCEDURE — 99205 PR OFFICE/OUTPT VISIT, NEW, LEVL V, 60-74 MIN: ICD-10-PCS | Mod: S$GLB,,, | Performed by: SURGERY

## 2019-03-27 PROCEDURE — 3044F PR MOST RECENT HEMOGLOBIN A1C LEVEL <7.0%: ICD-10-PCS | Mod: CPTII,S$GLB,, | Performed by: SURGERY

## 2019-03-27 PROCEDURE — 99999 PR PBB SHADOW E&M-EST. PATIENT-LVL IV: ICD-10-PCS | Mod: PBBFAC,,, | Performed by: SURGERY

## 2019-03-27 PROCEDURE — 3077F PR MOST RECENT SYSTOLIC BLOOD PRESSURE >= 140 MM HG: ICD-10-PCS | Mod: CPTII,S$GLB,, | Performed by: SURGERY

## 2019-03-27 PROCEDURE — 3080F DIAST BP >= 90 MM HG: CPT | Mod: CPTII,S$GLB,, | Performed by: SURGERY

## 2019-03-27 PROCEDURE — 3077F SYST BP >= 140 MM HG: CPT | Mod: CPTII,S$GLB,, | Performed by: SURGERY

## 2019-03-27 PROCEDURE — 3080F PR MOST RECENT DIASTOLIC BLOOD PRESSURE >= 90 MM HG: ICD-10-PCS | Mod: CPTII,S$GLB,, | Performed by: SURGERY

## 2019-03-27 PROCEDURE — 3008F BODY MASS INDEX DOCD: CPT | Mod: CPTII,S$GLB,, | Performed by: SURGERY

## 2019-03-27 PROCEDURE — 99999 PR PBB SHADOW E&M-EST. PATIENT-LVL IV: CPT | Mod: PBBFAC,,, | Performed by: SURGERY

## 2019-03-27 PROCEDURE — 3044F HG A1C LEVEL LT 7.0%: CPT | Mod: CPTII,S$GLB,, | Performed by: SURGERY

## 2019-03-27 PROCEDURE — 99205 OFFICE O/P NEW HI 60 MIN: CPT | Mod: S$GLB,,, | Performed by: SURGERY

## 2019-03-27 RX ORDER — FLUTICASONE FUROATE AND VILANTEROL TRIFENATATE 100; 25 UG/1; UG/1
POWDER RESPIRATORY (INHALATION)
COMMUNITY
Start: 2019-03-07 | End: 2022-01-20

## 2019-03-27 NOTE — PROGRESS NOTES
Initial Consult    Chief Complaint   Patient presents with    Obesity       History of Present Illness:  Patient is a 43 y.o. female who is referred for evaluation of surgical treatment of morbid obesity. Her Body mass index is 39.94 kg/m². She has known comorbidities of diabetes mellitus and hypertension. She has attended the bariatric seminar and is most interested in gastric sleeve surgery.      Past attempts at weight loss include: Unsupervised: gym membership;  Supervised:  Dietitian;  Diet pills: green tea pills;  Exercise attempts: walking, group classes, water aerobics      Weight history:   At current weight:  5 years  Obese for 5 years.  More than 35 pounds overweight for 5 years.  More than 100 pounds overweight for 5 years.  Started dieting at 43 years old.  Maximum weight reached: 246 pounds  Most weight lost was 3 pounds through exercise for 1 month.  She describes Her eating habits as sweet eater, snacker/grazer.    JOSUE screening: has and wears a mask    Reflux screening: heartburn controlled with diet    Review of patient's allergies indicates:  No Known Allergies    Current Outpatient Medications   Medication Sig Dispense Refill    amlodipine (NORVASC) 10 MG tablet Take 10 mg by mouth once daily.      atorvastatin (LIPITOR) 80 MG tablet       BREO ELLIPTA 100-25 mcg/dose diskus inhaler       escitalopram oxalate (LEXAPRO) 20 MG tablet Take 20 mg by mouth once daily.      fluticasone (FLONASE) 50 mcg/actuation nasal spray SPRAY ONCE IEN ONCE D  0    glipiZIDE (GLUCOTROL) 5 MG tablet       hydrocodone-chlorpheniramine (TUSSIONEX) 10-8 mg/5 mL suspension TK 10 ML PO Q 12 H PRN FOR COUGH FOR UP TO 10 DAYS  0    metFORMIN (GLUCOPHAGE) 1000 MG tablet       ONETOUCH DELICA LANCETS 33 gauge Misc TEST ONCE D  3    ONETOUCH ULTRA BLUE TEST STRIP Strp TEST ONCE D BEFORE BREAKFAST  3    ONETOUCH ULTRAMINI kit UTD  0    oxybutynin (DITROPAN) 5 MG Tab       pregabalin (LYRICA) 75 MG capsule Take 1  at night for 3 days, then increase to twice a day 60 capsule 2    sertraline (ZOLOFT) 100 MG tablet       albuterol (PROVENTIL) 2.5 mg /3 mL (0.083 %) nebulizer solution   3    VENTOLIN HFA 90 mcg/actuation inhaler        No current facility-administered medications for this visit.        Past Medical History:   Diagnosis Date    Anxiety     DDD (degenerative disc disease), cervical     Diabetes mellitus     Hypertension     JOSUE on CPAP      Past Surgical History:   Procedure Laterality Date    HYSTERECTOMY      laparoscopic    Injection, Steroid, Epidural LUMBAR L4/5 TESS N/A 10/23/2018    Performed by Ed [facility] at [facility]     Family History   Problem Relation Age of Onset    Diabetes Father      Social History     Tobacco Use    Smoking status: Never Smoker    Smokeless tobacco: Never Used   Substance Use Topics    Alcohol use: Yes     Comment: social    Drug use: No        Chart review:    1/1/5/19: Pulmonary Dr. Baeza: treated for morbid obesity, josue, anemia    Lab review:    Lab Results   Component Value Date    TSH 1.286 03/29/2019     Lab Results   Component Value Date    CHOL 203 (H) 03/29/2019     Lab Results   Component Value Date    HDL 55 03/29/2019     Lab Results   Component Value Date    LDLCALC 135.4 03/29/2019     Lab Results   Component Value Date    TRIG 63 03/29/2019     Lab Results   Component Value Date    CHOLHDL 27.1 03/29/2019     Lab Results   Component Value Date    HGBA1C 6.8 (H) 03/29/2019       Radiology review:    7/2018: CT chest: images independently reviewed: hepatic steatosis    Review of Systems:  Review of Systems   Constitutional: Positive for diaphoresis. Negative for chills and fever.   HENT: Negative for congestion, sinus pressure, sneezing, sore throat, tinnitus and voice change.    Eyes: Negative for pain, redness and itching.   Respiratory: Negative for apnea, cough, choking, chest tightness, shortness of breath, wheezing and stridor.   "  Cardiovascular: Negative for chest pain, palpitations and leg swelling.   Gastrointestinal: Negative for abdominal pain, anal bleeding, constipation, diarrhea, nausea and vomiting.   Endocrine: Positive for heat intolerance. Negative for cold intolerance.   Genitourinary: Negative for difficulty urinating and dysuria.   Musculoskeletal: Positive for arthralgias and back pain. Negative for gait problem.   Skin: Negative for rash and wound.   Allergic/Immunologic: Positive for environmental allergies. Negative for food allergies.   Neurological: Negative for dizziness, light-headedness and headaches.   Hematological: Negative for adenopathy. Bruises/bleeds easily.   Psychiatric/Behavioral: Negative for agitation and confusion.       Physical:     Vital Signs (Most Recent)  Temp: 97.9 °F (36.6 °C) (03/27/19 1103)  Pulse: 77 (03/27/19 1103)  Resp: 16 (03/27/19 1103)  BP: (!) 149/91 (03/27/19 1103)  5' 6" (1.676 m)  112.2 kg (247 lb 7.5 oz)     Body comp:  Fat Percent:  48.3 %  Fat Mass:  117.8 lb  FFM:  126.2 lb  TBW: 92.4 lb  TBW %:  37.9 %  BMR: 1803 kcal          Physical Exam:  Physical Exam   Constitutional: She is oriented to person, place, and time. She appears well-developed and well-nourished. No distress.   HENT:   Head: Normocephalic and atraumatic.   Mouth/Throat: No oropharyngeal exudate.   Eyes: Pupils are equal, round, and reactive to light. Conjunctivae and EOM are normal. No scleral icterus.   Neck: Normal range of motion. Neck supple. No JVD present. No tracheal deviation present. No thyromegaly present.   Cardiovascular: Normal rate, regular rhythm and normal heart sounds. Exam reveals no gallop and no friction rub.   No murmur heard.  Pulmonary/Chest: Effort normal and breath sounds normal. No stridor. No respiratory distress. She has no wheezes. She has no rales. She exhibits no tenderness.   Abdominal: Soft. Bowel sounds are normal. She exhibits no distension and no mass. There is no tenderness. " There is no rebound and no guarding.   Umbilical hernia  Well healed lap scars   Musculoskeletal: Normal range of motion. She exhibits no edema or tenderness.   Lymphadenopathy:     She has no cervical adenopathy.   Neurological: She is alert and oriented to person, place, and time. No cranial nerve deficit.   Skin: Skin is warm and dry. No rash noted. She is not diaphoretic. No erythema.   Psychiatric: She has a normal mood and affect. Her behavior is normal.   Nursing note and vitals reviewed.      ASSESSMENT/PLAN:        1. Morbid obesity  BMP    CBC w/ Auto Differential    H. Pylori Antibody, IGG    Hepatic Panel    Lipid Profile    Magnesium    Phosphorus    Vitamin B1    Ambulatory consult to Cardiology    EKG 12-lead    FL Upper GI Without KUB   2. Class 2 obesity due to excess calories without serious comorbidity with body mass index (BMI) of 39.0 to 39.9 in adult     3. Type 2 diabetes mellitus with peripheral neuropathy  Hg A1c    Ambulatory consult to Nutrition Services   4. JOSUE (obstructive sleep apnea)     5. Hypertension, unspecified type     6. Hypothyroidism, unspecified type  T3    T4    TSH    Free T4   7. B12 deficiency  Vitamin B12   8. Vitamin D deficiency  Vitamin D 25 Hydroxy   9. Folate deficiency  Folate Serum   10. Iron deficiency  Iron & TIBC       Plan:  Juan Carlos Turner has morbid obesity as their Body mass index is 39.94 kg/m². She would benefit from weight loss surgery and has chosen gastric sleeve surgery as the preferred procedure. She understands that this is a tool and lifestyle change will be necessary to keep weight off. I went over possible complications of all surgeries with the patient and she is agreeable to surgery.    She will need:    Labs  EKG  UGI   dietary consult  psych consult   Seminar    I will obtain the following clearances prior to surgery: Cardiology         Diet plan: high protein low carb- mainly meats and vegetables  Exercise plan: Cardiovascular exercise,  get HR over 100 for 20 minutes 3 times per week.  Start multivitamin

## 2019-03-28 ENCOUNTER — TELEPHONE (OUTPATIENT)
Dept: BARIATRICS | Facility: CLINIC | Age: 44
End: 2019-03-28

## 2019-03-28 NOTE — TELEPHONE ENCOUNTER
----- Message from Isabela Ochoa sent at 3/28/2019  8:21 AM CDT -----  Type: Needs Medical Advice    Who Called:  Patient  Best Call Back Number: [phone]  Additional Information: Went to schedule her upper GI at the main campus but was told office did not put in the order. Please call to advise.

## 2019-03-29 ENCOUNTER — HOSPITAL ENCOUNTER (OUTPATIENT)
Dept: CARDIOLOGY | Facility: OTHER | Age: 44
Discharge: HOME | End: 2019-03-29
Attending: SURGERY
Payer: MEDICARE

## 2019-03-29 DIAGNOSIS — E66.01 MORBID OBESITY: ICD-10-CM

## 2019-03-29 DIAGNOSIS — E66.01 MORBID OBESITY: Primary | ICD-10-CM

## 2019-03-29 PROCEDURE — 93010 ELECTROCARDIOGRAM REPORT: CPT | Mod: ,,, | Performed by: INTERNAL MEDICINE

## 2019-03-29 PROCEDURE — 93005 ELECTROCARDIOGRAM TRACING: CPT

## 2019-03-29 PROCEDURE — 93010 EKG 12-LEAD: ICD-10-PCS | Mod: ,,, | Performed by: INTERNAL MEDICINE

## 2019-04-01 ENCOUNTER — TELEPHONE (OUTPATIENT)
Dept: RADIOLOGY | Facility: HOSPITAL | Age: 44
End: 2019-04-01

## 2019-04-02 ENCOUNTER — HOSPITAL ENCOUNTER (OUTPATIENT)
Dept: RADIOLOGY | Facility: HOSPITAL | Age: 44
Discharge: HOME | End: 2019-04-02
Attending: SURGERY
Payer: MEDICARE

## 2019-04-02 DIAGNOSIS — E66.01 MORBID OBESITY: ICD-10-CM

## 2019-04-02 PROCEDURE — 74240 X-RAY XM UPR GI TRC 1CNTRST: CPT | Mod: TC,FY

## 2019-04-02 PROCEDURE — 74240 FL UPPER GI WITHOUT KUB: ICD-10-PCS | Mod: 26,,, | Performed by: RADIOLOGY

## 2019-04-02 PROCEDURE — 74240 X-RAY XM UPR GI TRC 1CNTRST: CPT | Mod: 26,,, | Performed by: RADIOLOGY

## 2019-04-10 ENCOUNTER — CLINICAL SUPPORT (OUTPATIENT)
Dept: BARIATRICS | Facility: CLINIC | Age: 44
End: 2019-04-10
Payer: MEDICARE

## 2019-04-10 VITALS — WEIGHT: 244.69 LBS | HEIGHT: 66 IN | BODY MASS INDEX: 39.32 KG/M2

## 2019-04-10 DIAGNOSIS — I10 HYPERTENSION, UNSPECIFIED TYPE: ICD-10-CM

## 2019-04-10 DIAGNOSIS — E11.42 TYPE 2 DIABETES MELLITUS WITH PERIPHERAL NEUROPATHY: ICD-10-CM

## 2019-04-10 DIAGNOSIS — Z71.3 DIETARY COUNSELING: ICD-10-CM

## 2019-04-10 DIAGNOSIS — E66.2 MORBID (SEVERE) OBESITY WITH ALVEOLAR HYPOVENTILATION: ICD-10-CM

## 2019-04-10 DIAGNOSIS — E66.09 CLASS 2 OBESITY DUE TO EXCESS CALORIES WITHOUT SERIOUS COMORBIDITY WITH BODY MASS INDEX (BMI) OF 39.0 TO 39.9 IN ADULT: ICD-10-CM

## 2019-04-10 PROCEDURE — 97802 MEDICAL NUTRITION INDIV IN: CPT | Mod: S$GLB,,, | Performed by: DIETITIAN, REGISTERED

## 2019-04-10 PROCEDURE — 99999 PR PBB SHADOW E&M-EST. PATIENT-LVL II: CPT | Mod: PBBFAC,,, | Performed by: DIETITIAN, REGISTERED

## 2019-04-10 PROCEDURE — 97802 PR MED NUTR THER, 1ST, INDIV, EA 15 MIN: ICD-10-PCS | Mod: S$GLB,,, | Performed by: DIETITIAN, REGISTERED

## 2019-04-10 PROCEDURE — 99999 PR PBB SHADOW E&M-EST. PATIENT-LVL II: ICD-10-PCS | Mod: PBBFAC,,, | Performed by: DIETITIAN, REGISTERED

## 2019-04-10 NOTE — PROGRESS NOTES
"NUTRITIONAL CONSULT    Referring Physician: Dr. Montanez  Reason for MNT Referral: Initial assessment for sleeve gastrectomy work-up    PAST MEDICAL HISTORY:   43 y.o. female  Body mass index is 39.5 kg/m²..    Past attempts at weight loss include: Unsupervised: gym membership;  Supervised:  Dietitian;  Diet pills: green tea pills;  Exercise attempts: walking, group classes, water aerobics       Weight history:   At current weight:  5 years  Obese for 5 years.  More than 35 pounds overweight for 5 years.  More than 100 pounds overweight for 5 years.  Started dieting at 43 years old.  Maximum weight reached: 246 pounds  Most weight lost was 3 pounds through exercise for 1 month.          Past Medical History:   Diagnosis Date    Anxiety     DDD (degenerative disc disease), cervical     Diabetes mellitus     Hypertension     JOSUE on CPAP        CLINICAL DATA:  43 y.o.-year-old Black or  female.  Height: 5'6"  Weight: 244 lbs  IBW:  130 lbs+/- 10%  BMI: 39.50  The patient's goal weight (50% EBW): 180 lbs  Personal goal weight: < 190 lbs    Goal for Bariatric Surgery: to improve health, to improve quality of life, to lose weight and to prevent future medical conditions    NUTRITIONAL NEEDS:  1803 Calories (using BMR/ bariatric sx)  60-80 Grams Protein per bariatric surgery    NUTRITION & HEALTH HISTORY:  Greatest challenge: dining out frequency, starchy CHO, portion control and emotional eating    Current diet recall: Breakfast: strawberries, 2 eggs, 1 sausage link; Lunch: premier shake; snack: strawberries; Dinner: skips     Current Diet:  Meal pattern:  2 , skips dinner often  Protein supplements:  Once/ day  Snackin-1 / day  Vegetables: Likes a variety. Eats daily.  Fruits: Likes a variety. Eats daily.  Beverages: water and diet soda  Dining out: Reduced lately. Never. Mostly restaurants.  Cooking at home: Daily. Mostly baked and grilled meat, fish and vegetables.    Exercise:  Past exercise: " Fair    Current exercise: walking 20 mins/day  Restrictions to exercise: none    Vitamins / Minerals / Herbs:   None       Labs:    no recent, none available at this time    Food Allergies:   none    Social:  Disabled.  Lives with adult  children  Grocery shopping and food prep boyfriend or self  Patient believes the household will be supportive after surgery.  Alcohol: None.  Smoking: None.    ASSESSMENT:  · Patient reports attempts at weight loss, only to regain lost weight.  · Patient demonstrated knowledge of healthy eating behaviors and exercise patterns; admits to not eating healthy and not exercising at this point.  · Patient states willingness and demonstrates willingness to change lifestyle and make behavior modifications as evidenced by weight loss, dietary changes, including protein drinks, increased vegetables and reduced dining out.    Insurance requires medically supervised diet prior to consideration for bariatric surgery.    Barriers to Education: none    Stage of change: determination    NUTRITION DIAGNOSIS:    Obesity related to Food and nutrition related knowledge deficit as evidence by BMI.    BARIATRIC DIET DISCUSSION/PLAN:  Discussed diet after surgery and related to patient's food record.  Reviewed nutrition guidelines for before and after surgery.  Answered all questions.  Resume work-up for surgery.  Continue to review Bariatric Nutrition Guidebook at home and call with any questions.  Work on Bariatric Nutrition Checklist.  Increase exercise.  Start shopping for bariatric vitamins & minerals.  Return to clinic.  No skipping meals, Eliminate diet soda    RECOMMENDATIONS:  Patient is a good candidate for bariatric surgery.    Needs additional visit(s) with MD..    Patient verbalized understanding.    Expect good  compliance after surgery at this time.    Communicated nutrition plan with bariatric team.    SESSION TIME:  60 minutes

## 2019-04-11 ENCOUNTER — OFFICE VISIT (OUTPATIENT)
Dept: SLEEP MEDICINE | Facility: CLINIC | Age: 44
End: 2019-04-11
Payer: MEDICARE

## 2019-04-11 VITALS
WEIGHT: 247.81 LBS | SYSTOLIC BLOOD PRESSURE: 122 MMHG | DIASTOLIC BLOOD PRESSURE: 72 MMHG | HEIGHT: 66 IN | HEART RATE: 81 BPM | BODY MASS INDEX: 39.82 KG/M2

## 2019-04-11 DIAGNOSIS — G47.33 OSA (OBSTRUCTIVE SLEEP APNEA): Primary | ICD-10-CM

## 2019-04-11 PROCEDURE — 3078F PR MOST RECENT DIASTOLIC BLOOD PRESSURE < 80 MM HG: ICD-10-PCS | Mod: CPTII,S$GLB,, | Performed by: NURSE PRACTITIONER

## 2019-04-11 PROCEDURE — 3008F BODY MASS INDEX DOCD: CPT | Mod: CPTII,S$GLB,, | Performed by: NURSE PRACTITIONER

## 2019-04-11 PROCEDURE — 99213 OFFICE O/P EST LOW 20 MIN: CPT | Mod: S$GLB,,, | Performed by: NURSE PRACTITIONER

## 2019-04-11 PROCEDURE — 3078F DIAST BP <80 MM HG: CPT | Mod: CPTII,S$GLB,, | Performed by: NURSE PRACTITIONER

## 2019-04-11 PROCEDURE — 99999 PR PBB SHADOW E&M-EST. PATIENT-LVL III: CPT | Mod: PBBFAC,,, | Performed by: NURSE PRACTITIONER

## 2019-04-11 PROCEDURE — 3074F SYST BP LT 130 MM HG: CPT | Mod: CPTII,S$GLB,, | Performed by: NURSE PRACTITIONER

## 2019-04-11 PROCEDURE — 99213 PR OFFICE/OUTPT VISIT, EST, LEVL III, 20-29 MIN: ICD-10-PCS | Mod: S$GLB,,, | Performed by: NURSE PRACTITIONER

## 2019-04-11 PROCEDURE — 3008F PR BODY MASS INDEX (BMI) DOCUMENTED: ICD-10-PCS | Mod: CPTII,S$GLB,, | Performed by: NURSE PRACTITIONER

## 2019-04-11 PROCEDURE — 99999 PR PBB SHADOW E&M-EST. PATIENT-LVL III: ICD-10-PCS | Mod: PBBFAC,,, | Performed by: NURSE PRACTITIONER

## 2019-04-11 PROCEDURE — 3074F PR MOST RECENT SYSTOLIC BLOOD PRESSURE < 130 MM HG: ICD-10-PCS | Mod: CPTII,S$GLB,, | Performed by: NURSE PRACTITIONER

## 2019-04-11 NOTE — PROGRESS NOTES
"Juan Carlos Turner  was seen in follow-up for JOSUE management and CPAP equipment check.     10/22/2018 checked in 11:50 for 11:20 am NP appt     INTERVAL HISTORY:    04/11/2019 YOANDY Guerra NP: Returns in follow-up for CPAP compliance. Of note, CPAP usage during last office visit < required 70%. Use has improved and continues to trend to more consistent use. Reports sleep is significantly more refreshing. With CPAP she no longer snores, have interrupted sleep, or have daytime fatigue and sleepiness. Denies air leaks with mask. Denies oral/nasal drying with AmaraView FFM. Almost done with bariatric surgery work up and hoping to have surgery done May 2019.     Dreamstation APAP 6 - 20 cm, 24/30 days, > 4 hours: 66.7%, Days Used Average: 4 h 32 m, All Days Used Average: 3 h 37 , Predicted AHI: 1.4, Ave LL: 10%, Ave PB: 0.3%, 90%tile pressure: 8.5 cm      02/14/2019 YOANDY Guerra NP: Pt returns after set up of PAP machine on 01/18/2019 @ [facility]. Pt sleep complaints of snoring, interrupted sleep, unrefreshing sleep, daytime fatigue, and sleepiness resolves with PAP use. Denies oral/nasal drying with AmaraView FF mask. Denies mask leaks. Denies rainout.  Denies pressure intolerance or air hunger. Denies congestion. Denies aerophagia.     Some nights she does not use machine because she sleeps some place else. Didn't know she can unplug machine and travel with it.     Dreamstation APAP 6 - 20 cm, 01/17/2019 - 02/13/2019, 21 days, > 4 hours: 60.7%, Ave AHI: 1.6, Ave LL: 1.9%, Ave PB: 0.1%, 90%tile pressure: 9.8 cm      01/10/2019 YOANDY Guerra NP: Initial HISTORY OF PRESENT ILLNESS: Juan Carlos Turner is a 43 y.o. female is here for sleep evaluation.       Patient complaints include: snoring, interrupted sleep, unrefreshing sleep, daytime fatigue, and sleepiness.   Discussed results  Pt ready to try CPAP     09/11/2018 HST AHI 13, RDI 42, low O2 82.6% % time < 90% SpO2 0.3%; "pt told Jeremías she came today just to get machine.     Denies " symptoms of restless legs or kicking during sleep.    EPWORTH SLEEPINESS SCALE 1/10/2019   Sitting and reading 3   Watching TV 3   Sitting, inactive in a public place (e.g. a theatre or a meeting) 3   As a passenger in a car for an hour without a break 3   Lying down to rest in the afternoon when circumstances permit 3   Sitting and talking to someone 3   Sitting quietly after a lunch without alcohol 3   In a car, while stopped for a few minutes in traffic 3   Total score 24       SLEEP ROUTINE:  Sleep Clinic New Patient 1/10/2019   What time do you go to bed on a week day? (Give a range) 1-2am   What time do you go to bed on a day off? (Give a range) 1-2am   How long does it take you to fall asleep? (Give a range) 5-10 mins   On average, how many times per night do you wake up? 3   How long does it take you to fall back into sleep? (Give a range) 30 mins-3 hrs   What time do you wake up to start your day on a week day? (Give a range) 5-6am   What time do you wake up to start your day on a day off? (Give a range) 5-6am   What time do you get out of bed? (Give a range) 5:20- 6:20am   On average, how many hours do you sleep? 5   On average, how many naps do you take per day? 3-5   Rate your sleep quality from 0 to 5 (0-poor, 5-great). 0   Have you experienced:  Weight gain?   How much weight have you lost or gained (in lbs.) in the last year? 50lbs   On average, how many times per night do you go to the bathroom?  3   Have you ever had a sleep study/CPAP machine/surgery for sleep apnea? Yes   Have you ever had a CPAP machine for sleep apnea? No   Have you ever had surgery for sleep apnea? No          PAST MEDICAL HISTORY:    Active Ambulatory Problems     Diagnosis Date Noted    Allergic rhinitis due to pollen     Hematuria, microscopic     Moderate persistent asthma     Anemia     Fatigue     Unspecified abnormalities of breathing     Morbid (severe) obesity with alveolar hypoventilation     JOSUE (obstructive  sleep apnea)     Lumbar disc herniation with radiculopathy 09/20/2018    Vascular claudication 01/30/2019    Type 2 diabetes mellitus with peripheral neuropathy 01/30/2019    PVD (peripheral vascular disease) with claudication 01/30/2019    Class 2 obesity due to excess calories without serious comorbidity with body mass index (BMI) of 39.0 to 39.9 in adult 03/27/2019    Hypertension 03/27/2019     Resolved Ambulatory Problems     Diagnosis Date Noted    SOB (shortness of breath)      Past Medical History:   Diagnosis Date    Anxiety     DDD (degenerative disc disease), cervical     Diabetes mellitus     Hypertension     JOSUE on CPAP                 PAST SURGICAL HISTORY:    Past Surgical History:   Procedure Laterality Date    HYSTERECTOMY      laparoscopic    Injection, Steroid, Epidural LUMBAR L4/5 TESS N/A 10/23/2018    Performed by Ed Pina MD at [facility] PAIN T         FAMILY HISTORY:                Family History   Problem Relation Age of Onset    Diabetes Father        SOCIAL HISTORY:          Tobacco:   Social History     Tobacco Use   Smoking Status Never Smoker   Smokeless Tobacco Never Used       Alcohol use:    Social History     Substance and Sexual Activity   Alcohol Use Yes    Comment: social                 ALLERGIES:  Review of patient's allergies indicates:  No Known Allergies    CURRENT MEDICATIONS:    Current Outpatient Medications   Medication Sig Dispense Refill    albuterol (PROVENTIL) 2.5 mg /3 mL (0.083 %) nebulizer solution   3    amlodipine (NORVASC) 10 MG tablet Take 10 mg by mouth once daily.      atorvastatin (LIPITOR) 80 MG tablet       BREO ELLIPTA 100-25 mcg/dose diskus inhaler       escitalopram oxalate (LEXAPRO) 20 MG tablet Take 20 mg by mouth once daily.      fluticasone (FLONASE) 50 mcg/actuation nasal spray SPRAY ONCE IEN ONCE D  0    glipiZIDE (GLUCOTROL) 5 MG tablet       hydrocodone-chlorpheniramine (TUSSIONEX) 10-8 mg/5 mL suspension TK 10 ML  "PO Q 12 H PRN FOR COUGH FOR UP TO 10 DAYS  0    metFORMIN (GLUCOPHAGE) 1000 MG tablet       ONETOUCH DELICA LANCETS 33 gauge Misc TEST ONCE D  3    ONETOUCH ULTRA BLUE TEST STRIP Strp TEST ONCE D BEFORE BREAKFAST  3    ONETOUCH ULTRAMINI kit UTD  0    oxybutynin (DITROPAN) 5 MG Tab       pregabalin (LYRICA) 75 MG capsule Take 1 at night for 3 days, then increase to twice a day 60 capsule 2    sertraline (ZOLOFT) 100 MG tablet       VENTOLIN HFA 90 mcg/actuation inhaler        No current facility-administered medications for this visit.                   REVIEW OF SYSTEMS:     Sleep related symptoms as per HPI.  Sleep Clinic ROS  1/10/2019   Difficulty breathing through the nose?  Yes   Sore throat or dry mouth in the morning? Yes   Irregular or very fast heart beat?  Sometimes   Shortness of breath?  Yes   Acid reflux? No   Body aches and pains?  Yes   Morning headaches? Yes   Dizziness? Yes   Mood changes?  Sometimes   Do you exercise?  No   Do you feel like moving your legs a lot?  Yes       Otherwise, a balance of 10 systems reviewed is negative.          PHYSICAL EXAM:  /72   Pulse 81   Ht 5' 6" (1.676 m)   Wt 112.4 kg (247 lb 12.8 oz)   BMI 40.00 kg/m²   GENERAL: Obese development, well groomed                                            ASSESSMENT:    JOSUE, mild AHI, severe RDI. The patient symptomatically has snoring, interrupted sleep, unrefreshing sleep, and daytime fatigue resolves with CPAP. The patient is experiencing symptomatic experience especially since increasing PAP use; now acclimated to CPAP and plans on continuing efforts to use CPAP nightly.   Medical co-morbidities:asthma and obesity.  This warrants continued  treatment for obstructive sleep apnea.      Obesity, BMI > 35, discussed relationship between JOSUE and weight; contemplating/planning diet/exercise for weight loss     PLAN:    Treatment:  -Increase mask on time w/ APAP 6-20 cm. Increase to 6 hours nightly, ultimate goal " hourly use nightly   -Cautioned about potential pressure intolerance as weight reduces after bariatric surgery - pt to contact clinic if so for remote adjustment/set f/u prn  - Long term goal of continued weight loss and retesting for sleep apnea at new weight plateau.  -RTC 6 months, sooner if needed.     Education: During our discussion today, we talked about the etiology of obstructive sleep apnea as well as the potential ramifications of untreated sleep apnea, which could include daytime sleepiness, hypertension, heart disease and/or stroke. We discussed potential treatment options, which could include weight loss, body positioning, continuous positive airway pressure (CPAP), OA, EPAP, or referral for surgical consideration.     Precautions: The patient was advised to abstain from driving should they feel sleepy  or drowsy.

## 2019-04-11 NOTE — LETTER
April 11, 2019      Chandler Baeza MD  [address]  [facility] [address]           [facility] [place] [address] [facility] [address]  [address]  [facility] [address]  Phone: [phone]          Patient: Juan Carlos Turner   MR Number: [id number]   YOB: 1975   Date of Visit: 4/11/2019       Dear Dr. Chandler Baeza:    Thank you for referring Juan Carlos Turner to me for evaluation. Attached you will find relevant portions of my assessment and plan of care.    If you have questions, please do not hesitate to call me. I look forward to following Juan Carlos Turner along with you.    Sincerely,    Paula Guerra, JAYDEN    Enclosure  CC:  No Recipients    If you would like to receive this communication electronically, please contact [email or web address] or [phone] to request more information on [employer] access.    For providers and/or their staff who would like to refer a patient to [facility], please contact us through our one-stop-shop provider referral line, [facility], at [phone].    If you feel you have received this communication in error or would no longer like to receive these types of communications, please e-mail [email or web address]

## 2019-04-25 ENCOUNTER — OFFICE VISIT (OUTPATIENT)
Dept: BARIATRICS | Facility: CLINIC | Age: 44
End: 2019-04-25
Payer: MEDICARE

## 2019-04-25 VITALS
HEIGHT: 66 IN | BODY MASS INDEX: 37.93 KG/M2 | RESPIRATION RATE: 16 BRPM | TEMPERATURE: 98 F | DIASTOLIC BLOOD PRESSURE: 88 MMHG | SYSTOLIC BLOOD PRESSURE: 140 MMHG | WEIGHT: 236 LBS | HEART RATE: 68 BPM

## 2019-04-25 DIAGNOSIS — E11.42 TYPE 2 DIABETES MELLITUS WITH PERIPHERAL NEUROPATHY: ICD-10-CM

## 2019-04-25 DIAGNOSIS — E66.01 MORBID OBESITY: Primary | ICD-10-CM

## 2019-04-25 DIAGNOSIS — G47.33 OSA ON CPAP: ICD-10-CM

## 2019-04-25 DIAGNOSIS — E66.09 CLASS 2 OBESITY DUE TO EXCESS CALORIES WITHOUT SERIOUS COMORBIDITY WITH BODY MASS INDEX (BMI) OF 38.0 TO 38.9 IN ADULT: ICD-10-CM

## 2019-04-25 DIAGNOSIS — I10 HYPERTENSION, UNSPECIFIED TYPE: ICD-10-CM

## 2019-04-25 PROCEDURE — 99214 OFFICE O/P EST MOD 30 MIN: CPT | Mod: S$GLB,,, | Performed by: SURGERY

## 2019-04-25 PROCEDURE — 3008F PR BODY MASS INDEX (BMI) DOCUMENTED: ICD-10-PCS | Mod: CPTII,S$GLB,, | Performed by: SURGERY

## 2019-04-25 PROCEDURE — 99214 PR OFFICE/OUTPT VISIT, EST, LEVL IV, 30-39 MIN: ICD-10-PCS | Mod: S$GLB,,, | Performed by: SURGERY

## 2019-04-25 PROCEDURE — 3079F DIAST BP 80-89 MM HG: CPT | Mod: CPTII,S$GLB,, | Performed by: SURGERY

## 2019-04-25 PROCEDURE — 3044F HG A1C LEVEL LT 7.0%: CPT | Mod: CPTII,S$GLB,, | Performed by: SURGERY

## 2019-04-25 PROCEDURE — 3044F PR MOST RECENT HEMOGLOBIN A1C LEVEL <7.0%: ICD-10-PCS | Mod: CPTII,S$GLB,, | Performed by: SURGERY

## 2019-04-25 PROCEDURE — 3008F BODY MASS INDEX DOCD: CPT | Mod: CPTII,S$GLB,, | Performed by: SURGERY

## 2019-04-25 PROCEDURE — 99999 PR PBB SHADOW E&M-EST. PATIENT-LVL IV: CPT | Mod: PBBFAC,,, | Performed by: SURGERY

## 2019-04-25 PROCEDURE — 99999 PR PBB SHADOW E&M-EST. PATIENT-LVL IV: ICD-10-PCS | Mod: PBBFAC,,, | Performed by: SURGERY

## 2019-04-25 PROCEDURE — 3077F PR MOST RECENT SYSTOLIC BLOOD PRESSURE >= 140 MM HG: ICD-10-PCS | Mod: CPTII,S$GLB,, | Performed by: SURGERY

## 2019-04-25 PROCEDURE — 3077F SYST BP >= 140 MM HG: CPT | Mod: CPTII,S$GLB,, | Performed by: SURGERY

## 2019-04-25 PROCEDURE — 3079F PR MOST RECENT DIASTOLIC BLOOD PRESSURE 80-89 MM HG: ICD-10-PCS | Mod: CPTII,S$GLB,, | Performed by: SURGERY

## 2019-04-25 RX ORDER — HEPARIN SODIUM 5000 [USP'U]/ML
5000 INJECTION, SOLUTION INTRAVENOUS; SUBCUTANEOUS
Status: CANCELLED | OUTPATIENT
Start: 2019-04-25

## 2019-04-25 RX ORDER — FAMOTIDINE 10 MG/ML
20 INJECTION INTRAVENOUS
Status: CANCELLED | OUTPATIENT
Start: 2019-04-25

## 2019-04-25 NOTE — PATIENT INSTRUCTIONS
Vitamin d3 1000 units daily- over the counter    Pre op Diet- start April 29    Breakfast- protein shake  Lunch- protein shake  Dinner- 3 ounces of meat and vegetables ( from list)    NO SNACKING  Only water to drink

## 2019-04-25 NOTE — PROGRESS NOTES
Follow up    SUBJECTIVE:     Chief Complaint   Patient presents with    Obesity       History of Present Illness:    Patient is a 43 y.o. female who is referred for evaluation of surgical treatment of morbid obesity. Her Body mass index is 38.09 kg/m². She has known comorbidities of diabetes mellitus, fatty, and hypertension. She has attended the bariatric seminar and is most interested in gastric sleeve surgery.  Review of patient's allergies indicates:  No Known Allergies    Current Outpatient Medications   Medication Sig Dispense Refill    amlodipine (NORVASC) 10 MG tablet Take 10 mg by mouth once daily.      atorvastatin (LIPITOR) 80 MG tablet       BREO ELLIPTA 100-25 mcg/dose diskus inhaler       escitalopram oxalate (LEXAPRO) 20 MG tablet Take 20 mg by mouth once daily.      fluticasone (FLONASE) 50 mcg/actuation nasal spray SPRAY ONCE IEN ONCE D  0    glipiZIDE (GLUCOTROL) 5 MG tablet       metFORMIN (GLUCOPHAGE) 1000 MG tablet       ONETOUCH DELICA LANCETS 33 gauge Misc TEST ONCE D  3    ONETOUCH ULTRA BLUE TEST STRIP Strp TEST ONCE D BEFORE BREAKFAST  3    ONETOUCH ULTRAMINI kit UTD  0    oxybutynin (DITROPAN) 5 MG Tab       pregabalin (LYRICA) 75 MG capsule Take 1 at night for 3 days, then increase to twice a day 60 capsule 2    sertraline (ZOLOFT) 100 MG tablet       albuterol (PROVENTIL) 2.5 mg /3 mL (0.083 %) nebulizer solution   3    VENTOLIN HFA 90 mcg/actuation inhaler        No current facility-administered medications for this visit.        Past Medical History:   Diagnosis Date    Anxiety     DDD (degenerative disc disease), cervical     Diabetes mellitus     Hypertension     JOSUE on CPAP      Past Surgical History:   Procedure Laterality Date    HYSTERECTOMY      laparoscopic    Injection, Steroid, Epidural LUMBAR L4/5 TESS N/A 10/23/2018    Performed by Ed Pina MD at [facility] PAIN MGT     Family History   Problem Relation Age of Onset    Diabetes Father      Social  "History     Tobacco Use    Smoking status: Never Smoker    Smokeless tobacco: Never Used   Substance Use Topics    Alcohol use: Yes     Comment: social    Drug use: No        Review of Systems:  Review of Systems   Constitutional: Positive for diaphoresis. Negative for chills and fever.   HENT: Negative for congestion, sinus pressure, sneezing, sore throat, tinnitus and voice change.    Eyes: Negative for pain, redness and itching.   Respiratory: Negative for apnea, cough, choking, chest tightness, shortness of breath, wheezing and stridor.    Cardiovascular: Negative for chest pain, palpitations and leg swelling.   Gastrointestinal: Negative for abdominal pain, anal bleeding, constipation, diarrhea, nausea and vomiting.   Endocrine: Positive for heat intolerance. Negative for cold intolerance.   Genitourinary: Negative for difficulty urinating and dysuria.   Musculoskeletal: Positive for arthralgias and back pain. Negative for gait problem.   Skin: Negative for rash and wound.   Allergic/Immunologic: Positive for environmental allergies. Negative for food allergies.   Neurological: Negative for dizziness, light-headedness and headaches.   Hematological: Negative for adenopathy. Bruises/bleeds easily.   Psychiatric/Behavioral: Negative for agitation and confusion.       OBJECTIVE:     Vital Signs (Most Recent)  Temp: 98 °F (36.7 °C) (04/25/19 0918)  Pulse: 68 (04/25/19 0918)  Resp: 16 (04/25/19 0918)  BP: (!) 140/88 (04/25/19 0918)  5' 6" (1.676 m)  107 kg (236 lb)     Body comp:  Fat Percent:  49.2 %  Fat Mass:  116.2 lb  FFM:  119.8 lb  TBW: 87.8 lb  TBW %:  37.2 %  BMR: 1720 kcal      Physical Exam:  Physical Exam   Constitutional: She is oriented to person, place, and time. She appears well-developed and well-nourished. No distress.   HENT:   Head: Normocephalic and atraumatic.   Mouth/Throat: No oropharyngeal exudate.   Eyes: Pupils are equal, round, and reactive to light. Conjunctivae and EOM are normal. " No scleral icterus.   Neck: Normal range of motion. Neck supple. No JVD present. No tracheal deviation present. No thyromegaly present.   Cardiovascular: Normal rate, regular rhythm and normal heart sounds. Exam reveals no gallop and no friction rub.   No murmur heard.  Pulmonary/Chest: Effort normal and breath sounds normal. No stridor. No respiratory distress. She has no wheezes. She has no rales. She exhibits no tenderness.   Abdominal: Soft. Bowel sounds are normal. She exhibits no distension and no mass. There is no tenderness. There is no rebound and no guarding.   Umbilical hernia  Well healed lap scars   Musculoskeletal: Normal range of motion. She exhibits no edema or tenderness.   Lymphadenopathy:     She has no cervical adenopathy.   Neurological: She is alert and oriented to person, place, and time. No cranial nerve deficit.   Skin: Skin is warm and dry. No rash noted. She is not diaphoretic. No erythema.   Psychiatric: She has a normal mood and affect. Her behavior is normal.   Nursing note and vitals reviewed.      ASSESSMENT/PLAN:        Labs- reviewed  EKG- reviewed  UGI - small   dietary consult- done  psych consult - done Monday- Dr. Starr- on Fatoumata- verbally clear  Seminar     I will obtain the following clearances prior to surgery: Cardiology - [facility] heart doctor-         1. Morbid obesity     2. Class 2 obesity due to excess calories without serious comorbidity with body mass index (BMI) of 38.0 to 38.9 in adult     3. JOSUE on CPAP     4. Hypertension, unspecified type     5. Type 2 diabetes mellitus with peripheral neuropathy         Plan:  Juan Carlos Turner has morbid obesity as their Body mass index is 38.09 kg/m². She would benefit from weight loss surgery and has chosen gastric sleeve surgery as the preferred procedure. She understands that this is a tool and lifestyle change will be necessary to keep weight off. I went over possible complications of the chosen surgery with  the patient and she is agreeable to surgery.    She has been instructed to start the pre operative diet.    She surgical date is May 13, 2019      The patient has been taught the post operative diet and understands that she will need to stay on this diet to prevent complication.  They are aware of the post operative follow up and return to see me after surgery to treat/prevent/identify any complications.  she has been advised to attend support groups post operatively.

## 2019-04-25 NOTE — PROGRESS NOTES
Medically Supervised Weight Loss Documentation    Date of Visit: 04/25/2019    Patient: Juan Carlos Turner    Current Weight: 236  Current BMI: Body mass index is 38.09 kg/m².  Weight Change: - 11 pounds    Last Weight: 247    Beginning Weight: 247 pounds      Vitals:   Vitals:    04/25/19 0918   BP: (!) 140/88   Pulse: 68   Resp: 16   Temp: 98 °F (36.7 °C)       Comorbidities:   Past Medical History:   Diagnosis Date    Anxiety     DDD (degenerative disc disease), cervical     Diabetes mellitus     Hypertension     JOSUE on CPAP        Medications:  Current Outpatient Medications on File Prior to Visit   Medication Sig Dispense Refill    amlodipine (NORVASC) 10 MG tablet Take 10 mg by mouth once daily.      atorvastatin (LIPITOR) 80 MG tablet       BREO ELLIPTA 100-25 mcg/dose diskus inhaler       escitalopram oxalate (LEXAPRO) 20 MG tablet Take 20 mg by mouth once daily.      fluticasone (FLONASE) 50 mcg/actuation nasal spray SPRAY ONCE IEN ONCE D  0    glipiZIDE (GLUCOTROL) 5 MG tablet       metFORMIN (GLUCOPHAGE) 1000 MG tablet       ONETOUCH DELICA LANCETS 33 gauge Misc TEST ONCE D  3    ONETOUCH ULTRA BLUE TEST STRIP Strp TEST ONCE D BEFORE BREAKFAST  3    ONETOUCH ULTRAMINI kit UTD  0    oxybutynin (DITROPAN) 5 MG Tab       pregabalin (LYRICA) 75 MG capsule Take 1 at night for 3 days, then increase to twice a day 60 capsule 2    sertraline (ZOLOFT) 100 MG tablet       albuterol (PROVENTIL) 2.5 mg /3 mL (0.083 %) nebulizer solution   3    VENTOLIN HFA 90 mcg/actuation inhaler       [DISCONTINUED] hydrocodone-chlorpheniramine (TUSSIONEX) 10-8 mg/5 mL suspension TK 10 ML PO Q 12 H PRN FOR COUGH FOR UP TO 10 DAYS  0     No current facility-administered medications on file prior to visit.          Body comp:  Fat Percent:  49.2 %  Fat Mass:  116.2 lb  FFM:  119.8 lb  TBW: 87.8 lb  TBW %:  37.2 %  BMR: 1720 kcal      Diet Education Discussed:    Breakfast:  2 eggs  Snacks on berries  Lunch:  Meat  with cucumbers   Dinner:  Meats and vegetables  No chips, no sodas    Exercise/Activity Discussed:    Walking daily with kids multiple blocks outside    Behavior or Diet Goals for this patient:    We talked about finding a protein shake for breakfast- premier clear.  Continue walking and increase intensity    Labs- reviewed  EKG- reviewed  UGI - small   dietary consult- done  psych consult - done Monday- Dr. Starr- on Fatoumata- verbally clear  Seminar     I will obtain the following clearances prior to surgery: Cardiology - [facility] heart doctor-     : I met with the patient for 15 minutes and counseled her for over 50% of that time

## 2019-04-25 NOTE — H&P (VIEW-ONLY)
Follow up    SUBJECTIVE:     Chief Complaint   Patient presents with    Obesity       History of Present Illness:    Patient is a 43 y.o. female who is referred for evaluation of surgical treatment of morbid obesity. Her Body mass index is 38.09 kg/m². She has known comorbidities of diabetes mellitus, fatty, and hypertension. She has attended the bariatric seminar and is most interested in gastric sleeve surgery.  Review of patient's allergies indicates:  No Known Allergies    Current Outpatient Medications   Medication Sig Dispense Refill    amlodipine (NORVASC) 10 MG tablet Take 10 mg by mouth once daily.      atorvastatin (LIPITOR) 80 MG tablet       BREO ELLIPTA 100-25 mcg/dose diskus inhaler       escitalopram oxalate (LEXAPRO) 20 MG tablet Take 20 mg by mouth once daily.      fluticasone (FLONASE) 50 mcg/actuation nasal spray SPRAY ONCE IEN ONCE D  0    glipiZIDE (GLUCOTROL) 5 MG tablet       metFORMIN (GLUCOPHAGE) 1000 MG tablet       ONETOUCH DELICA LANCETS 33 gauge Misc TEST ONCE D  3    ONETOUCH ULTRA BLUE TEST STRIP Strp TEST ONCE D BEFORE BREAKFAST  3    ONETOUCH ULTRAMINI kit UTD  0    oxybutynin (DITROPAN) 5 MG Tab       pregabalin (LYRICA) 75 MG capsule Take 1 at night for 3 days, then increase to twice a day 60 capsule 2    sertraline (ZOLOFT) 100 MG tablet       albuterol (PROVENTIL) 2.5 mg /3 mL (0.083 %) nebulizer solution   3    VENTOLIN HFA 90 mcg/actuation inhaler        No current facility-administered medications for this visit.        Past Medical History:   Diagnosis Date    Anxiety     DDD (degenerative disc disease), cervical     Diabetes mellitus     Hypertension     JOSUE on CPAP      Past Surgical History:   Procedure Laterality Date    HYSTERECTOMY      laparoscopic    Injection, Steroid, Epidural LUMBAR L4/5 TESS N/A 10/23/2018    Performed by Ed Pina MD at [facility]     Family History   Problem Relation Age of Onset    Diabetes Father      Social  "History     Tobacco Use    Smoking status: Never Smoker    Smokeless tobacco: Never Used   Substance Use Topics    Alcohol use: Yes     Comment: social    Drug use: No        Review of Systems:  Review of Systems   Constitutional: Positive for diaphoresis. Negative for chills and fever.   HENT: Negative for congestion, sinus pressure, sneezing, sore throat, tinnitus and voice change.    Eyes: Negative for pain, redness and itching.   Respiratory: Negative for apnea, cough, choking, chest tightness, shortness of breath, wheezing and stridor.    Cardiovascular: Negative for chest pain, palpitations and leg swelling.   Gastrointestinal: Negative for abdominal pain, anal bleeding, constipation, diarrhea, nausea and vomiting.   Endocrine: Positive for heat intolerance. Negative for cold intolerance.   Genitourinary: Negative for difficulty urinating and dysuria.   Musculoskeletal: Positive for arthralgias and back pain. Negative for gait problem.   Skin: Negative for rash and wound.   Allergic/Immunologic: Positive for environmental allergies. Negative for food allergies.   Neurological: Negative for dizziness, light-headedness and headaches.   Hematological: Negative for adenopathy. Bruises/bleeds easily.   Psychiatric/Behavioral: Negative for agitation and confusion.       OBJECTIVE:     Vital Signs (Most Recent)  Temp: 98 °F (36.7 °C) (04/25/19 0918)  Pulse: 68 (04/25/19 0918)  Resp: 16 (04/25/19 0918)  BP: (!) 140/88 (04/25/19 0918)  5' 6" (1.676 m)  107 kg (236 lb)     Body comp:  Fat Percent:  49.2 %  Fat Mass:  116.2 lb  FFM:  119.8 lb  TBW: 87.8 lb  TBW %:  37.2 %  BMR: 1720 kcal      Physical Exam:  Physical Exam   Constitutional: She is oriented to person, place, and time. She appears well-developed and well-nourished. No distress.   HENT:   Head: Normocephalic and atraumatic.   Mouth/Throat: No oropharyngeal exudate.   Eyes: Pupils are equal, round, and reactive to light. Conjunctivae and EOM are normal. " No scleral icterus.   Neck: Normal range of motion. Neck supple. No JVD present. No tracheal deviation present. No thyromegaly present.   Cardiovascular: Normal rate, regular rhythm and normal heart sounds. Exam reveals no gallop and no friction rub.   No murmur heard.  Pulmonary/Chest: Effort normal and breath sounds normal. No stridor. No respiratory distress. She has no wheezes. She has no rales. She exhibits no tenderness.   Abdominal: Soft. Bowel sounds are normal. She exhibits no distension and no mass. There is no tenderness. There is no rebound and no guarding.   Umbilical hernia  Well healed lap scars   Musculoskeletal: Normal range of motion. She exhibits no edema or tenderness.   Lymphadenopathy:     She has no cervical adenopathy.   Neurological: She is alert and oriented to person, place, and time. No cranial nerve deficit.   Skin: Skin is warm and dry. No rash noted. She is not diaphoretic. No erythema.   Psychiatric: She has a normal mood and affect. Her behavior is normal.   Nursing note and vitals reviewed.      ASSESSMENT/PLAN:        Labs- reviewed  EKG- reviewed  UGI - small   dietary consult- done  psych consult - done Monday- Dr. Starr- on Fatoumata- verbally clear  Seminar     I will obtain the following clearances prior to surgery: Cardiology - [facility] heart doctor-         1. Morbid obesity     2. Class 2 obesity due to excess calories without serious comorbidity with body mass index (BMI) of 38.0 to 38.9 in adult     3. JOSUE on CPAP     4. Hypertension, unspecified type     5. Type 2 diabetes mellitus with peripheral neuropathy         Plan:  Juan Carlos Turner has morbid obesity as their Body mass index is 38.09 kg/m². She would benefit from weight loss surgery and has chosen gastric sleeve surgery as the preferred procedure. She understands that this is a tool and lifestyle change will be necessary to keep weight off. I went over possible complications of the chosen surgery with  the patient and she is agreeable to surgery.    She has been instructed to start the pre operative diet.    She surgical date is May 13, 2019      The patient has been taught the post operative diet and understands that she will need to stay on this diet to prevent complication.  They are aware of the post operative follow up and return to see me after surgery to treat/prevent/identify any complications.  she has been advised to attend support groups post operatively.

## 2019-04-25 NOTE — H&P
Follow up    SUBJECTIVE:     Chief Complaint   Patient presents with    Obesity       History of Present Illness:    Patient is a 43 y.o. female who is referred for evaluation of surgical treatment of morbid obesity. Her Body mass index is 38.09 kg/m². She has known comorbidities of diabetes mellitus, fatty, and hypertension. She has attended the bariatric seminar and is most interested in gastric sleeve surgery.  Review of patient's allergies indicates:  No Known Allergies    Current Outpatient Medications   Medication Sig Dispense Refill    amlodipine (NORVASC) 10 MG tablet Take 10 mg by mouth once daily.      atorvastatin (LIPITOR) 80 MG tablet       BREO ELLIPTA 100-25 mcg/dose diskus inhaler       escitalopram oxalate (LEXAPRO) 20 MG tablet Take 20 mg by mouth once daily.      fluticasone (FLONASE) 50 mcg/actuation nasal spray SPRAY ONCE IEN ONCE D  0    glipiZIDE (GLUCOTROL) 5 MG tablet       metFORMIN (GLUCOPHAGE) 1000 MG tablet       ONETOUCH DELICA LANCETS 33 gauge Misc TEST ONCE D  3    ONETOUCH ULTRA BLUE TEST STRIP Strp TEST ONCE D BEFORE BREAKFAST  3    ONETOUCH ULTRAMINI kit UTD  0    oxybutynin (DITROPAN) 5 MG Tab       pregabalin (LYRICA) 75 MG capsule Take 1 at night for 3 days, then increase to twice a day 60 capsule 2    sertraline (ZOLOFT) 100 MG tablet       albuterol (PROVENTIL) 2.5 mg /3 mL (0.083 %) nebulizer solution   3    VENTOLIN HFA 90 mcg/actuation inhaler        No current facility-administered medications for this visit.        Past Medical History:   Diagnosis Date    Anxiety     DDD (degenerative disc disease), cervical     Diabetes mellitus     Hypertension     JOSUE on CPAP      Past Surgical History:   Procedure Laterality Date    HYSTERECTOMY      laparoscopic    Injection, Steroid, Epidural LUMBAR L4/5 TESS N/A 10/23/2018    Performed by Ed Pina MD at [facility] PAIN MGT     Family History   Problem Relation Age of Onset    Diabetes Father      Social  "History     Tobacco Use    Smoking status: Never Smoker    Smokeless tobacco: Never Used   Substance Use Topics    Alcohol use: Yes     Comment: social    Drug use: No        Review of Systems:  Review of Systems   Constitutional: Positive for diaphoresis. Negative for chills and fever.   HENT: Negative for congestion, sinus pressure, sneezing, sore throat, tinnitus and voice change.    Eyes: Negative for pain, redness and itching.   Respiratory: Negative for apnea, cough, choking, chest tightness, shortness of breath, wheezing and stridor.    Cardiovascular: Negative for chest pain, palpitations and leg swelling.   Gastrointestinal: Negative for abdominal pain, anal bleeding, constipation, diarrhea, nausea and vomiting.   Endocrine: Positive for heat intolerance. Negative for cold intolerance.   Genitourinary: Negative for difficulty urinating and dysuria.   Musculoskeletal: Positive for arthralgias and back pain. Negative for gait problem.   Skin: Negative for rash and wound.   Allergic/Immunologic: Positive for environmental allergies. Negative for food allergies.   Neurological: Negative for dizziness, light-headedness and headaches.   Hematological: Negative for adenopathy. Bruises/bleeds easily.   Psychiatric/Behavioral: Negative for agitation and confusion.       OBJECTIVE:     Vital Signs (Most Recent)  Temp: 98 °F (36.7 °C) (04/25/19 0918)  Pulse: 68 (04/25/19 0918)  Resp: 16 (04/25/19 0918)  BP: (!) 140/88 (04/25/19 0918)  5' 6" (1.676 m)  107 kg (236 lb)     Body comp:  Fat Percent:  49.2 %  Fat Mass:  116.2 lb  FFM:  119.8 lb  TBW: 87.8 lb  TBW %:  37.2 %  BMR: 1720 kcal      Physical Exam:  Physical Exam   Constitutional: She is oriented to person, place, and time. She appears well-developed and well-nourished. No distress.   HENT:   Head: Normocephalic and atraumatic.   Mouth/Throat: No oropharyngeal exudate.   Eyes: Pupils are equal, round, and reactive to light. Conjunctivae and EOM are normal. " No scleral icterus.   Neck: Normal range of motion. Neck supple. No JVD present. No tracheal deviation present. No thyromegaly present.   Cardiovascular: Normal rate, regular rhythm and normal heart sounds. Exam reveals no gallop and no friction rub.   No murmur heard.  Pulmonary/Chest: Effort normal and breath sounds normal. No stridor. No respiratory distress. She has no wheezes. She has no rales. She exhibits no tenderness.   Abdominal: Soft. Bowel sounds are normal. She exhibits no distension and no mass. There is no tenderness. There is no rebound and no guarding.   Umbilical hernia  Well healed lap scars   Musculoskeletal: Normal range of motion. She exhibits no edema or tenderness.   Lymphadenopathy:     She has no cervical adenopathy.   Neurological: She is alert and oriented to person, place, and time. No cranial nerve deficit.   Skin: Skin is warm and dry. No rash noted. She is not diaphoretic. No erythema.   Psychiatric: She has a normal mood and affect. Her behavior is normal.   Nursing note and vitals reviewed.      ASSESSMENT/PLAN:        Labs- reviewed  EKG- reviewed  UGI - small   dietary consult- done  psych consult - done Monday- Dr. Starr- on Fatoumata- verbally clear  Seminar     I will obtain the following clearances prior to surgery: Cardiology - [facility] heart doctor-         1. Morbid obesity     2. Class 2 obesity due to excess calories without serious comorbidity with body mass index (BMI) of 38.0 to 38.9 in adult     3. JOSUE on CPAP     4. Hypertension, unspecified type     5. Type 2 diabetes mellitus with peripheral neuropathy         Plan:  Juan Carlos Turner has morbid obesity as their Body mass index is 38.09 kg/m². She would benefit from weight loss surgery and has chosen gastric sleeve surgery as the preferred procedure. She understands that this is a tool and lifestyle change will be necessary to keep weight off. I went over possible complications of the chosen surgery with  the patient and she is agreeable to surgery.    She has been instructed to start the pre operative diet.    She surgical date is May 13, 2019      The patient has been taught the post operative diet and understands that she will need to stay on this diet to prevent complication.  They are aware of the post operative follow up and return to see me after surgery to treat/prevent/identify any complications.  she has been advised to attend support groups post operatively.

## 2019-05-06 ENCOUNTER — TELEPHONE (OUTPATIENT)
Dept: BARIATRICS | Facility: CLINIC | Age: 44
End: 2019-05-06

## 2019-05-06 NOTE — TELEPHONE ENCOUNTER
----- Message from Isabela Ochoa sent at 5/6/2019  9:45 AM CDT -----  Type:  Patient Returning Call    Who Called:  Patient  Who Left Message for Patient:  n/a  Does the patient know what this is regarding?:  surgery  Best Call Back Number:  [phone] (home)

## 2019-05-07 ENCOUNTER — TELEPHONE (OUTPATIENT)
Dept: BARIATRICS | Facility: CLINIC | Age: 44
End: 2019-05-07

## 2019-05-07 NOTE — TELEPHONE ENCOUNTER
Called pt, advised that we did not receive psych clearance, without that, sx will have to be postponed. pt stated she will call psych office and call back in 10 mins.

## 2019-05-08 ENCOUNTER — CLINICAL SUPPORT (OUTPATIENT)
Dept: BARIATRICS | Facility: CLINIC | Age: 44
End: 2019-05-08
Payer: MEDICARE

## 2019-05-08 ENCOUNTER — HOSPITAL ENCOUNTER (OUTPATIENT)
Dept: PREADMISSION TESTING | Facility: HOSPITAL | Age: 44
Discharge: HOME | End: 2019-05-08
Attending: SURGERY
Payer: MEDICARE

## 2019-05-08 VITALS — HEIGHT: 66 IN | WEIGHT: 240.63 LBS | BODY MASS INDEX: 38.67 KG/M2 | RESPIRATION RATE: 16 BRPM

## 2019-05-08 PROCEDURE — 99900103 DSU ONLY-NO CHARGE-INITIAL HR (STAT)

## 2019-05-08 PROCEDURE — 99900104 DSU ONLY-NO CHARGE-EA ADD'L HR (STAT)

## 2019-05-08 PROCEDURE — 99999 PR PBB SHADOW E&M-EST. PATIENT-LVL III: ICD-10-PCS | Mod: PBBFAC,,,

## 2019-05-08 PROCEDURE — 99999 PR PBB SHADOW E&M-EST. PATIENT-LVL III: CPT | Mod: PBBFAC,,,

## 2019-05-08 NOTE — PROGRESS NOTES
Pt weigh in before surgery    tanita scale: 240.6lb wt           38.8bmi           49.5% fat           119lb fat mass           121.6lb ffm           89lb tbw

## 2019-05-08 NOTE — DISCHARGE INSTRUCTIONS
To confirm, Your doctor has instructed you that surgery is scheduled for: 5/13/2019    Please report to [facility], Registration the morning of surgery. You must check-in and receive a wristband before going to your procedure.    Pre-Op will call the Friday prior to surgery between 1:00 and 6:00 PM with the final arrival time.  Phone number: [phone]    PLEASE NOTE:  The surgery schedule has many variables which may affect the time of your surgery case.  Family members should be available if your surgery time changes.  Plan to be here the day of your procedure between 4-6 hours.    MEDICATIONS:  TAKE ONLY THESE MEDICATIONS WITH A SMALL SIP OF WATER THE MORNING OF YOUR PROCEDURE:  VENTOLIN IF NEEDED, BREO, ESCITALOPRAM, FLONASE IF NEEDED, AMLODIPINE, ATORVASTATIN    NO METFORMIN NIGHT BEFORE OR MORNING OF PROCEDURE      DO NOT TAKE THESE MEDICATIONS 5-7 DAYS PRIOR to your procedure or per your surgeon's request: ASPIRIN, ALEVE, ADVIL, IBUPROFEN, FISH OIL VITAMIN E, HERBALS  (May take Tylenol)    ONLY if you are prescribed any types of blood thinners such as:  Aspirin, Coumadin, Plavix, Pradaxa, Xarelto, Aggrenox, Effient, Eliquis, Savasya, Brilinta, or any other, ask your surgeon whether you should stop taking them and how long before surgery you should stop.  You may also need to verify with the prescribing physician if it is ok to stop your medication.      INSTRUCTIONS IMPORTANT!!  · Do not eat or drink anything between midnight and the time of your procedure- this includes gum, mints, and candy.  · Do not smoke or drink alcoholic beverages 24 hours prior to your procedure.  · Shower the night before AND the morning of your procedure with a Chlorhexidine wash such as Hibiclens or Dial antibacterial soap from the neck down.  Do not get it on your face or in your eyes.  You may use your own shampoo and face wash. This helps your skin to be as bacteria free as possible.    · If you wear  contact lenses, dentures, hearing aids or glasses, bring a container to put them in during surgery and give to a family member for safe keeping.  Please leave all jewelry, piercing's and valuables at home.   · DO NOT remove hair from the surgery site.  Do not shave the incision site unless you are given specific instructions to do so.    · ONLY if you have been diagnosed with sleep apnea please bring your C-PAP machine.  · ONLY if you wear home oxygen please bring your portable oxygen tank the day of your procedure.  · ONLY if you have a history of OPEN HEART SURGERY you will need a clearance from your Cardiologist per Anesthesia.      · ONLY for patients requiring bowel prep, written instructions will be given by your doctor's office.  · ONLY if you have a neuro stimulator, please bring the controller with you the morning of surgery  · ONLY if a type and screen test is needed before surgery, please return: N/A  · If your doctor has scheduled you for an overnight stay, bring a small overnight bag with any personal items you need.  · Make arrangements in advance for transportation home by a responsible adult.  It is not safe to drive a vehicle during the 24 hours after anesthesia.      · Visiting hours are 10:00AM to 8:30PM.  For the safety of all patients, visitors under the age of 12 are not allowed above the first floor of the hospital.    · All [facility] facilities and properties are tobacco free.  Smoking is NOT allowed.       If you have any questions about these instructions, call Pre-Op Admit  Nursing at [phone] or the Pre-Op Day Surgery Unit at [phone].

## 2019-05-12 ENCOUNTER — ANESTHESIA EVENT (OUTPATIENT)
Dept: SURGERY | Facility: HOSPITAL | Age: 44
DRG: 620 | End: 2019-05-12
Payer: MEDICARE

## 2019-05-13 ENCOUNTER — HOSPITAL ENCOUNTER (INPATIENT)
Facility: HOSPITAL | Age: 44
LOS: 1 days | Discharge: HOME | DRG: 620 | End: 2019-05-14
Attending: SURGERY | Admitting: SURGERY
Payer: MEDICARE

## 2019-05-13 ENCOUNTER — ANESTHESIA (OUTPATIENT)
Dept: SURGERY | Facility: HOSPITAL | Age: 44
DRG: 620 | End: 2019-05-13
Payer: MEDICARE

## 2019-05-13 DIAGNOSIS — E66.09 CLASS 2 OBESITY DUE TO EXCESS CALORIES WITHOUT SERIOUS COMORBIDITY WITH BODY MASS INDEX (BMI) OF 39.0 TO 39.9 IN ADULT: Primary | ICD-10-CM

## 2019-05-13 DIAGNOSIS — E66.01 MORBID OBESITY: ICD-10-CM

## 2019-05-13 PROBLEM — E55.9 VITAMIN D DEFICIENCY, UNSPECIFIED: Status: ACTIVE | Noted: 2019-05-13

## 2019-05-13 LAB
POCT GLUCOSE: 145 MG/DL (ref 70–110)
POCT GLUCOSE: 171 MG/DL (ref 70–110)

## 2019-05-13 PROCEDURE — 88307 TISSUE EXAM BY PATHOLOGIST: CPT | Mod: 26,,, | Performed by: PATHOLOGY

## 2019-05-13 PROCEDURE — 25000003 PHARM REV CODE 250: Performed by: ANESTHESIOLOGY

## 2019-05-13 PROCEDURE — 94799 UNLISTED PULMONARY SVC/PX: CPT

## 2019-05-13 PROCEDURE — 99900104 DSU ONLY-NO CHARGE-EA ADD'L HR (STAT): Performed by: SURGERY

## 2019-05-13 PROCEDURE — 25000003 PHARM REV CODE 250: Performed by: SURGERY

## 2019-05-13 PROCEDURE — 43775 LAP SLEEVE GASTRECTOMY: CPT | Mod: ,,, | Performed by: SURGERY

## 2019-05-13 PROCEDURE — 36000711: Performed by: SURGERY

## 2019-05-13 PROCEDURE — 37000008 HC ANESTHESIA 1ST 15 MINUTES: Performed by: SURGERY

## 2019-05-13 PROCEDURE — 88307 TISSUE SPECIMEN TO PATHOLOGY - SURGERY: ICD-10-PCS | Mod: 26,,, | Performed by: PATHOLOGY

## 2019-05-13 PROCEDURE — 63600175 PHARM REV CODE 636 W HCPCS: Performed by: SURGERY

## 2019-05-13 PROCEDURE — 71000039 HC RECOVERY, EACH ADD'L HOUR: Performed by: SURGERY

## 2019-05-13 PROCEDURE — D9220A PRA ANESTHESIA: ICD-10-PCS | Mod: CRNA,,, | Performed by: NURSE ANESTHETIST, CERTIFIED REGISTERED

## 2019-05-13 PROCEDURE — 43775 PR LAP, GAST RESTRICT PROC, LONGITUDINAL GASTRECTOMY: ICD-10-PCS | Mod: ,,, | Performed by: SURGERY

## 2019-05-13 PROCEDURE — 27201423 OPTIME MED/SURG SUP & DEVICES STERILE SUPPLY: Performed by: SURGERY

## 2019-05-13 PROCEDURE — 71000033 HC RECOVERY, INTIAL HOUR: Performed by: SURGERY

## 2019-05-13 PROCEDURE — 37000009 HC ANESTHESIA EA ADD 15 MINS: Performed by: SURGERY

## 2019-05-13 PROCEDURE — 12000002 HC ACUTE/MED SURGE SEMI-PRIVATE ROOM

## 2019-05-13 PROCEDURE — D9220A PRA ANESTHESIA: Mod: CRNA,,, | Performed by: NURSE ANESTHETIST, CERTIFIED REGISTERED

## 2019-05-13 PROCEDURE — 94761 N-INVAS EAR/PLS OXIMETRY MLT: CPT

## 2019-05-13 PROCEDURE — D9220A PRA ANESTHESIA: ICD-10-PCS | Mod: ANES,,, | Performed by: ANESTHESIOLOGY

## 2019-05-13 PROCEDURE — 63600175 PHARM REV CODE 636 W HCPCS: Performed by: ANESTHESIOLOGY

## 2019-05-13 PROCEDURE — 63600175 PHARM REV CODE 636 W HCPCS: Performed by: NURSE ANESTHETIST, CERTIFIED REGISTERED

## 2019-05-13 PROCEDURE — 99900103 DSU ONLY-NO CHARGE-INITIAL HR (STAT): Performed by: SURGERY

## 2019-05-13 PROCEDURE — 36000710: Performed by: SURGERY

## 2019-05-13 PROCEDURE — 25000003 PHARM REV CODE 250: Performed by: NURSE ANESTHETIST, CERTIFIED REGISTERED

## 2019-05-13 PROCEDURE — D9220A PRA ANESTHESIA: Mod: ANES,,, | Performed by: ANESTHESIOLOGY

## 2019-05-13 PROCEDURE — S0028 INJECTION, FAMOTIDINE, 20 MG: HCPCS | Performed by: SURGERY

## 2019-05-13 PROCEDURE — 88307 TISSUE EXAM BY PATHOLOGIST: CPT | Performed by: PATHOLOGY

## 2019-05-13 DEVICE — IMPLANTABLE DEVICE: Type: IMPLANTABLE DEVICE | Site: ABDOMEN | Status: FUNCTIONAL

## 2019-05-13 RX ORDER — SUCCINYLCHOLINE CHLORIDE 20 MG/ML
INJECTION INTRAMUSCULAR; INTRAVENOUS
Status: DISCONTINUED | OUTPATIENT
Start: 2019-05-13 | End: 2019-05-13

## 2019-05-13 RX ORDER — MIDAZOLAM HYDROCHLORIDE 1 MG/ML
INJECTION, SOLUTION INTRAMUSCULAR; INTRAVENOUS
Status: DISCONTINUED | OUTPATIENT
Start: 2019-05-13 | End: 2019-05-13

## 2019-05-13 RX ORDER — DIAZEPAM 2 MG/1
2 TABLET ORAL EVERY 6 HOURS PRN
Status: DISCONTINUED | OUTPATIENT
Start: 2019-05-13 | End: 2019-05-14 | Stop reason: HOSPADM

## 2019-05-13 RX ORDER — FAMOTIDINE 10 MG/ML
20 INJECTION INTRAVENOUS
Status: DISCONTINUED | OUTPATIENT
Start: 2019-05-13 | End: 2019-05-13

## 2019-05-13 RX ORDER — DIPHENHYDRAMINE HYDROCHLORIDE 50 MG/ML
25 INJECTION INTRAMUSCULAR; INTRAVENOUS EVERY 6 HOURS PRN
Status: DISCONTINUED | OUTPATIENT
Start: 2019-05-13 | End: 2019-05-13

## 2019-05-13 RX ORDER — SODIUM CHLORIDE, SODIUM LACTATE, POTASSIUM CHLORIDE, CALCIUM CHLORIDE 600; 310; 30; 20 MG/100ML; MG/100ML; MG/100ML; MG/100ML
INJECTION, SOLUTION INTRAVENOUS CONTINUOUS
Status: DISCONTINUED | OUTPATIENT
Start: 2019-05-13 | End: 2019-05-14 | Stop reason: HOSPADM

## 2019-05-13 RX ORDER — BUPIVACAINE HYDROCHLORIDE AND EPINEPHRINE 2.5; 5 MG/ML; UG/ML
INJECTION, SOLUTION EPIDURAL; INFILTRATION; INTRACAUDAL; PERINEURAL
Status: DISCONTINUED | OUTPATIENT
Start: 2019-05-13 | End: 2019-05-13 | Stop reason: HOSPADM

## 2019-05-13 RX ORDER — DIPHENHYDRAMINE HYDROCHLORIDE 50 MG/ML
12.5 INJECTION INTRAMUSCULAR; INTRAVENOUS EVERY 4 HOURS PRN
Status: DISCONTINUED | OUTPATIENT
Start: 2019-05-13 | End: 2019-05-14 | Stop reason: HOSPADM

## 2019-05-13 RX ORDER — AMOXICILLIN 250 MG
1 CAPSULE ORAL 2 TIMES DAILY
Status: DISCONTINUED | OUTPATIENT
Start: 2019-05-14 | End: 2019-05-14 | Stop reason: HOSPADM

## 2019-05-13 RX ORDER — SODIUM CHLORIDE, SODIUM LACTATE, POTASSIUM CHLORIDE, CALCIUM CHLORIDE 600; 310; 30; 20 MG/100ML; MG/100ML; MG/100ML; MG/100ML
75 INJECTION, SOLUTION INTRAVENOUS CONTINUOUS
Status: DISCONTINUED | OUTPATIENT
Start: 2019-05-13 | End: 2019-05-13

## 2019-05-13 RX ORDER — HYDROMORPHONE HYDROCHLORIDE 2 MG/ML
1 INJECTION, SOLUTION INTRAMUSCULAR; INTRAVENOUS; SUBCUTANEOUS EVERY 6 HOURS PRN
Status: DISCONTINUED | OUTPATIENT
Start: 2019-05-13 | End: 2019-05-14 | Stop reason: HOSPADM

## 2019-05-13 RX ORDER — MEPERIDINE HYDROCHLORIDE 50 MG/ML
12.5 INJECTION INTRAMUSCULAR; INTRAVENOUS; SUBCUTANEOUS ONCE AS NEEDED
Status: DISCONTINUED | OUTPATIENT
Start: 2019-05-13 | End: 2019-05-13

## 2019-05-13 RX ORDER — FLUTICASONE FUROATE AND VILANTEROL 100; 25 UG/1; UG/1
1 POWDER RESPIRATORY (INHALATION) DAILY
Status: DISCONTINUED | OUTPATIENT
Start: 2019-05-14 | End: 2019-05-14 | Stop reason: HOSPADM

## 2019-05-13 RX ORDER — ATORVASTATIN CALCIUM 40 MG/1
80 TABLET, FILM COATED ORAL DAILY
Status: DISCONTINUED | OUTPATIENT
Start: 2019-05-14 | End: 2019-05-14 | Stop reason: HOSPADM

## 2019-05-13 RX ORDER — ONDANSETRON 2 MG/ML
8 INJECTION INTRAMUSCULAR; INTRAVENOUS EVERY 6 HOURS PRN
Status: DISCONTINUED | OUTPATIENT
Start: 2019-05-13 | End: 2019-05-14 | Stop reason: HOSPADM

## 2019-05-13 RX ORDER — FENTANYL CITRATE 50 UG/ML
25 INJECTION, SOLUTION INTRAMUSCULAR; INTRAVENOUS EVERY 5 MIN PRN
Status: DISCONTINUED | OUTPATIENT
Start: 2019-05-13 | End: 2019-05-13

## 2019-05-13 RX ORDER — DEXAMETHASONE SODIUM PHOSPHATE 4 MG/ML
INJECTION, SOLUTION INTRA-ARTICULAR; INTRALESIONAL; INTRAMUSCULAR; INTRAVENOUS; SOFT TISSUE
Status: DISCONTINUED | OUTPATIENT
Start: 2019-05-13 | End: 2019-05-13

## 2019-05-13 RX ORDER — CEFAZOLIN SODIUM 2 G/50ML
2 SOLUTION INTRAVENOUS
Status: COMPLETED | OUTPATIENT
Start: 2019-05-13 | End: 2019-05-14

## 2019-05-13 RX ORDER — ROCURONIUM BROMIDE 10 MG/ML
INJECTION, SOLUTION INTRAVENOUS
Status: DISCONTINUED | OUTPATIENT
Start: 2019-05-13 | End: 2019-05-13

## 2019-05-13 RX ORDER — ONDANSETRON 2 MG/ML
INJECTION INTRAMUSCULAR; INTRAVENOUS
Status: DISCONTINUED | OUTPATIENT
Start: 2019-05-13 | End: 2019-05-13

## 2019-05-13 RX ORDER — GLUCAGON 1 MG
1 KIT INJECTION
Status: DISCONTINUED | OUTPATIENT
Start: 2019-05-13 | End: 2019-05-14 | Stop reason: HOSPADM

## 2019-05-13 RX ORDER — HEPARIN SODIUM 5000 [USP'U]/ML
5000 INJECTION, SOLUTION INTRAVENOUS; SUBCUTANEOUS
Status: COMPLETED | OUTPATIENT
Start: 2019-05-13 | End: 2019-05-13

## 2019-05-13 RX ORDER — FAMOTIDINE 10 MG/ML
20 INJECTION INTRAVENOUS EVERY 12 HOURS
Status: DISCONTINUED | OUTPATIENT
Start: 2019-05-13 | End: 2019-05-14 | Stop reason: HOSPADM

## 2019-05-13 RX ORDER — ONDANSETRON 2 MG/ML
4 INJECTION INTRAMUSCULAR; INTRAVENOUS ONCE
Status: COMPLETED | OUTPATIENT
Start: 2019-05-13 | End: 2019-05-13

## 2019-05-13 RX ORDER — HYDROMORPHONE HYDROCHLORIDE 2 MG/ML
0.2 INJECTION, SOLUTION INTRAMUSCULAR; INTRAVENOUS; SUBCUTANEOUS EVERY 5 MIN PRN
Status: DISCONTINUED | OUTPATIENT
Start: 2019-05-13 | End: 2019-05-13

## 2019-05-13 RX ORDER — NEOSTIGMINE METHYLSULFATE 1 MG/ML
INJECTION, SOLUTION INTRAVENOUS
Status: DISCONTINUED | OUTPATIENT
Start: 2019-05-13 | End: 2019-05-13

## 2019-05-13 RX ORDER — ESCITALOPRAM OXALATE 10 MG/1
20 TABLET ORAL DAILY
Status: DISCONTINUED | OUTPATIENT
Start: 2019-05-14 | End: 2019-05-14 | Stop reason: HOSPADM

## 2019-05-13 RX ORDER — AMLODIPINE BESYLATE 5 MG/1
10 TABLET ORAL DAILY
Status: DISCONTINUED | OUTPATIENT
Start: 2019-05-14 | End: 2019-05-14 | Stop reason: HOSPADM

## 2019-05-13 RX ORDER — SODIUM CHLORIDE, SODIUM LACTATE, POTASSIUM CHLORIDE, CALCIUM CHLORIDE 600; 310; 30; 20 MG/100ML; MG/100ML; MG/100ML; MG/100ML
INJECTION, SOLUTION INTRAVENOUS CONTINUOUS
Status: DISCONTINUED | OUTPATIENT
Start: 2019-05-13 | End: 2019-05-13

## 2019-05-13 RX ORDER — INSULIN ASPART 100 [IU]/ML
0-5 INJECTION, SOLUTION INTRAVENOUS; SUBCUTANEOUS EVERY 6 HOURS PRN
Status: DISCONTINUED | OUTPATIENT
Start: 2019-05-13 | End: 2019-05-14 | Stop reason: HOSPADM

## 2019-05-13 RX ORDER — PROPOFOL 10 MG/ML
VIAL (ML) INTRAVENOUS
Status: DISCONTINUED | OUTPATIENT
Start: 2019-05-13 | End: 2019-05-13

## 2019-05-13 RX ORDER — GLYCOPYRROLATE 0.2 MG/ML
INJECTION INTRAMUSCULAR; INTRAVENOUS
Status: DISCONTINUED | OUTPATIENT
Start: 2019-05-13 | End: 2019-05-13

## 2019-05-13 RX ORDER — OXYCODONE HYDROCHLORIDE 5 MG/1
5 TABLET ORAL
Status: DISCONTINUED | OUTPATIENT
Start: 2019-05-13 | End: 2019-05-13

## 2019-05-13 RX ORDER — ACETAMINOPHEN 10 MG/ML
INJECTION, SOLUTION INTRAVENOUS
Status: DISCONTINUED | OUTPATIENT
Start: 2019-05-13 | End: 2019-05-13

## 2019-05-13 RX ORDER — ENOXAPARIN SODIUM 100 MG/ML
40 INJECTION SUBCUTANEOUS EVERY 24 HOURS
Status: DISCONTINUED | OUTPATIENT
Start: 2019-05-14 | End: 2019-05-14 | Stop reason: HOSPADM

## 2019-05-13 RX ORDER — SODIUM CHLORIDE 0.9 % (FLUSH) 0.9 %
3 SYRINGE (ML) INJECTION
Status: DISCONTINUED | OUTPATIENT
Start: 2019-05-13 | End: 2019-05-13

## 2019-05-13 RX ORDER — OXYCODONE HYDROCHLORIDE 10 MG/1
10 TABLET ORAL EVERY 4 HOURS PRN
Status: DISCONTINUED | OUTPATIENT
Start: 2019-05-13 | End: 2019-05-14 | Stop reason: HOSPADM

## 2019-05-13 RX ORDER — LIDOCAINE HYDROCHLORIDE 10 MG/ML
1 INJECTION, SOLUTION EPIDURAL; INFILTRATION; INTRACAUDAL; PERINEURAL ONCE
Status: COMPLETED | OUTPATIENT
Start: 2019-05-13 | End: 2019-05-13

## 2019-05-13 RX ORDER — FENTANYL CITRATE 50 UG/ML
INJECTION, SOLUTION INTRAMUSCULAR; INTRAVENOUS
Status: DISCONTINUED | OUTPATIENT
Start: 2019-05-13 | End: 2019-05-13

## 2019-05-13 RX ORDER — LIDOCAINE HCL/PF 100 MG/5ML
SYRINGE (ML) INTRAVENOUS
Status: DISCONTINUED | OUTPATIENT
Start: 2019-05-13 | End: 2019-05-13

## 2019-05-13 RX ADMIN — ONDANSETRON 4 MG: 2 INJECTION, SOLUTION INTRAMUSCULAR; INTRAVENOUS at 03:05

## 2019-05-13 RX ADMIN — GLYCOPYRROLATE 0.2 MG: 0.2 INJECTION, SOLUTION INTRAMUSCULAR; INTRAVENOUS at 03:05

## 2019-05-13 RX ADMIN — PROMETHAZINE HYDROCHLORIDE 12.5 MG: 25 INJECTION INTRAMUSCULAR; INTRAVENOUS at 06:05

## 2019-05-13 RX ADMIN — LIDOCAINE HYDROCHLORIDE: 10 INJECTION, SOLUTION EPIDURAL; INFILTRATION; INTRACAUDAL; PERINEURAL at 12:05

## 2019-05-13 RX ADMIN — ONDANSETRON 4 MG: 2 INJECTION INTRAMUSCULAR; INTRAVENOUS at 05:05

## 2019-05-13 RX ADMIN — CEFAZOLIN 2 G: 10 INJECTION, POWDER, FOR SOLUTION INTRAVENOUS; PARENTERAL at 10:05

## 2019-05-13 RX ADMIN — LIDOCAINE HYDROCHLORIDE 100 MG: 20 INJECTION, SOLUTION INTRAVENOUS at 04:05

## 2019-05-13 RX ADMIN — FENTANYL CITRATE 50 MCG: 50 INJECTION, SOLUTION INTRAMUSCULAR; INTRAVENOUS at 04:05

## 2019-05-13 RX ADMIN — FAMOTIDINE 20 MG: 10 INJECTION, SOLUTION INTRAVENOUS at 09:05

## 2019-05-13 RX ADMIN — SODIUM CHLORIDE, SODIUM LACTATE, POTASSIUM CHLORIDE, AND CALCIUM CHLORIDE: .6; .31; .03; .02 INJECTION, SOLUTION INTRAVENOUS at 05:05

## 2019-05-13 RX ADMIN — MIDAZOLAM 2 MG: 1 INJECTION INTRAMUSCULAR; INTRAVENOUS at 03:05

## 2019-05-13 RX ADMIN — SODIUM CHLORIDE, SODIUM LACTATE, POTASSIUM CHLORIDE, AND CALCIUM CHLORIDE: .6; .31; .03; .02 INJECTION, SOLUTION INTRAVENOUS at 10:05

## 2019-05-13 RX ADMIN — SODIUM CHLORIDE, SODIUM LACTATE, POTASSIUM CHLORIDE, AND CALCIUM CHLORIDE: .6; .31; .03; .02 INJECTION, SOLUTION INTRAVENOUS at 03:05

## 2019-05-13 RX ADMIN — GLYCOPYRROLATE 0.4 MG: 0.2 INJECTION, SOLUTION INTRAMUSCULAR; INTRAVENOUS at 04:05

## 2019-05-13 RX ADMIN — FENTANYL CITRATE 25 MCG: 50 INJECTION, SOLUTION INTRAMUSCULAR; INTRAVENOUS at 05:05

## 2019-05-13 RX ADMIN — DIAZEPAM 2 MG: 2 TABLET ORAL at 05:05

## 2019-05-13 RX ADMIN — LIDOCAINE HYDROCHLORIDE 50 MG: 20 INJECTION, SOLUTION INTRAVENOUS at 03:05

## 2019-05-13 RX ADMIN — FENTANYL CITRATE 50 MCG: 50 INJECTION, SOLUTION INTRAMUSCULAR; INTRAVENOUS at 03:05

## 2019-05-13 RX ADMIN — ROCURONIUM BROMIDE 30 MG: 10 INJECTION, SOLUTION INTRAVENOUS at 03:05

## 2019-05-13 RX ADMIN — ACETAMINOPHEN 1000 MG: 10 INJECTION, SOLUTION INTRAVENOUS at 03:05

## 2019-05-13 RX ADMIN — ROCURONIUM BROMIDE 10 MG: 10 INJECTION, SOLUTION INTRAVENOUS at 03:05

## 2019-05-13 RX ADMIN — PROPOFOL 180 MG: 10 INJECTION, EMULSION INTRAVENOUS at 03:05

## 2019-05-13 RX ADMIN — HEPARIN SODIUM 5000 UNITS: 5000 INJECTION, SOLUTION INTRAVENOUS; SUBCUTANEOUS at 12:05

## 2019-05-13 RX ADMIN — DEXAMETHASONE SODIUM PHOSPHATE 4 MG: 4 INJECTION, SOLUTION INTRAMUSCULAR; INTRAVENOUS at 03:05

## 2019-05-13 RX ADMIN — CEFAZOLIN 3 G: 1 INJECTION, POWDER, FOR SOLUTION INTRAVENOUS at 03:05

## 2019-05-13 RX ADMIN — HYDROMORPHONE HYDROCHLORIDE 1 MG: 2 INJECTION INTRAMUSCULAR; INTRAVENOUS; SUBCUTANEOUS at 06:05

## 2019-05-13 RX ADMIN — SUCCINYLCHOLINE CHLORIDE 140 MG: 20 INJECTION, SOLUTION INTRAMUSCULAR; INTRAVENOUS at 03:05

## 2019-05-13 RX ADMIN — NEOSTIGMINE METHYLSULFATE 3 MG: 1 INJECTION INTRAVENOUS at 04:05

## 2019-05-13 RX ADMIN — OXYCODONE HYDROCHLORIDE 5 MG: 5 TABLET ORAL at 05:05

## 2019-05-13 RX ADMIN — SODIUM CHLORIDE, SODIUM LACTATE, POTASSIUM CHLORIDE, AND CALCIUM CHLORIDE: .6; .31; .03; .02 INJECTION, SOLUTION INTRAVENOUS at 12:05

## 2019-05-13 NOTE — OP NOTE
Laparoscopic Sleeve Gastrectomy Procedure Note    Date of procedure:   05/13/2019    Indications: Morbid obesity unresponsive to medical treatment.    Pre-operative Diagnosis:   1. Morbid obesity      2. Class 2 obesity due to excess calories without serious comorbidity with body mass index (BMI) of 38.0 to 38.9 in adult      3. JOSUE on CPAP      4. Hypertension, unspecified type      5. Type 2 diabetes mellitus with peripheral neuropathy         Post-operative Diagnosis: Same    Surgeon: Jie Montanez MD    Assistants: none    No qualified resident was available to assist in this case    Anesthesia: General endotracheal anesthesia    ASA Class: 3    Procedure Details   The patient was seen in the Holding Room. The risks, benefits, complications, treatment options, and expected outcomes were discussed with the patient. The possibilities of reaction to medication, pulmonary aspiration, perforation of viscus, bleeding, recurrent infection, the need for additional procedures, failure to diagnose a condition, and creating a complication requiring transfusion or operation were discussed with the patient. The patient concurred with the proposed plan, giving informed consent. The site of surgery properly noted/marked. The patient was taken to Operating Room 6 identified as Juan Carlos May Lamark and the procedure verified as Sleeve Gastrectomy. A Time Out was held and the above information confirmed.    Full general anesthesia was induced with orotracheal intubation. The patient was prepped and draped in a supine position. Appropriate antibiotics were given intravenously.    An EGD did not show any retained food in the stomach.    The 5 mm [employer] medical optiview was used to enter into the abdominal cavity under direct vision in the right upper quadrant. The abdomen was insufflated.    There were no untoward findings on diagnostic laparoscopy. Trocars were placed under direct vision in the following fashion: a 5 mm trocar  in the lateral right (optiview location) and left upper quadrant; a 12 mm trocar in the left and right upper quadrant; and a 12 mm trocar in the raffi umbilical area. The Tawanda liver retractor was then placed through a high epigastric incision site and positioned to hold the liver.    The procedure was started by identifying an area approx. 5 cm proximal to the pylorus. The Harmonic was then used to take down the short gastrics up to the left rachelle which was identified and cleared.    The sleeve was started by using a green load without reinforcement to reduce the size of the antrum. The sleeve was then shaped using blue loads without reinforcement up the the left rachelle using the 36 [place] Visi G bougie as a sizing device.  The distal sleeve was stapled slightly further away from the scope than the proximal sleeve to avoid encroachment of the incisura. Bleeding was controlled with clips.     A provacative leak test, looking for air bubbles while the sleeve is insufflated and clamped, was preformed and did not reveal any leaks.  Evicell was placed on the staple line. The EGD done during the leak test revealed an appropriately sized sleeve without any narrowings or kinking.     Hemostasis was achieved.    The specimen was removed out of the left upper quadrant port and the site was closed with a zero vicryl.     Marcaine was injected at each port site.    The wounds were heavily irrigated. The skin was closed with 4-0 suture. Sterile dressings were applied.    Instrument, sponge, and needle counts were correct prior to wound closure and at the conclusion of the case.     Findings:  Normal anatomy    Estimated Blood Loss: 20.0 cc    Drains: none    Total IV Fluids: 1000 ml    Specimens: gastrectomy    Implants: none    Complications:  None; patient tolerated the procedure well.    Disposition: PACU - hemodynamically stable.    Condition: stable    Attending Attestation: I was present and scrubbed for the entire  procedure.

## 2019-05-13 NOTE — INTERVAL H&P NOTE
The patient has been examined and the H&P has been reviewed:    I concur with the findings and no changes have occurred since H&P was written.    Anesthesia/Surgery risks, benefits and alternative options discussed and understood by patient/family.          Active Hospital Problems    Diagnosis  POA    Morbid obesity [E66.01]  Yes      Resolved Hospital Problems   No resolved problems to display.

## 2019-05-13 NOTE — ANESTHESIA PREPROCEDURE EVALUATION
05/13/2019  Juan Carlos Turner is a 43 y.o., female.    Anesthesia Evaluation    I have reviewed the Patient Summary Reports.    I have reviewed the Nursing Notes.   I have reviewed the Medications.     Review of Systems  Cardiovascular:   Hypertension    Pulmonary:   Asthma asymptomatic Sleep Apnea, CPAP Morbid (severe) obesity with alveolar hypoventilation   Musculoskeletal:   Arthritis     Neurological:   Neuromuscular Disease,    Endocrine:   Diabetes, well controlled        Physical Exam  General:  Morbid Obesity    Airway/Jaw/Neck:  Airway Findings: Mouth Opening: Normal Tongue: Normal  General Airway Assessment: Adult, Good  Mallampati: II  Improves to II with phonation.  TM Distance: 4-6 cm      Dental:  Dental Findings: In tact   Chest/Lungs:  Chest/Lungs Findings: Clear to auscultation, Normal Respiratory Rate     Heart/Vascular:  Heart Findings: Rate: Normal  Rhythm: Regular Rhythm  Sounds: Normal  Heart murmur: negative       Mental Status:  Mental Status Findings:  Cooperative, Alert and Oriented         Anesthesia Plan  Type of Anesthesia, risks & benefits discussed:  Anesthesia Type:  general  Patient's Preference:   Intra-op Monitoring Plan: standard ASA monitors  Intra-op Monitoring Plan Comments:   Post Op Pain Control Plan:   Post Op Pain Control Plan Comments:   Induction:   IV  Beta Blocker:  Patient is not currently on a Beta-Blocker (No further documentation required).       Informed Consent: Patient understands risks and agrees with Anesthesia plan.  Questions answered. Anesthesia consent signed with patient.  ASA Score: 3     Day of Surgery Review of History & Physical: I have interviewed and examined the patient. I have reviewed the patient's H&P dated:  There are no significant changes.  H&P update referred to the surgeon.         Ready For Surgery From Anesthesia Perspective.

## 2019-05-13 NOTE — TRANSFER OF CARE
"Anesthesia Transfer of Care Note    Patient: Juan Carlos Turner    Procedure(s) Performed: Procedure(s) (LRB):  GASTRECTOMY, SLEEVE, LAPAROSCOPIC (N/A)    Patient location: PACU    Anesthesia Type: general    Transport from OR: Transported from OR on 2-3 L/min O2 by NC with adequate spontaneous ventilation    Post pain: adequate analgesia    Post assessment: no apparent anesthetic complications and tolerated procedure well    Post vital signs: stable    Level of consciousness: awake and alert    Nausea/Vomiting: no nausea/vomiting    Complications: none    Transfer of care protocol was followed      Last vitals:   Visit Vitals  /74 (BP Location: Left arm, Patient Position: Lying)   Pulse 67   Temp 36.7 °C (98 °F) (Skin)   Resp 18   Ht 5' 6" (1.676 m)   Wt 107 kg (236 lb)   SpO2 99%   Breastfeeding? No   BMI 38.09 kg/m²     "

## 2019-05-13 NOTE — PLAN OF CARE
Pain improving, frequently sleeping, no nausea, tolerating po, reevaluated per anesthesia MD and released to her room. Remote tele placed on patient

## 2019-05-13 NOTE — ANESTHESIA POSTPROCEDURE EVALUATION
Anesthesia Post Evaluation    Patient: Juan Carlos Turner    Procedure(s) Performed: Procedure(s) (LRB):  GASTRECTOMY, SLEEVE, LAPAROSCOPIC (N/A)    Final Anesthesia Type: general  Patient location during evaluation: PACU  Patient participation: Yes- Able to Participate  Level of consciousness: awake and alert and oriented  Post-procedure vital signs: reviewed and stable  Pain management: adequate  Airway patency: patent  PONV status at discharge: No PONV  Anesthetic complications: no      Cardiovascular status: blood pressure returned to baseline  Respiratory status: unassisted, spontaneous ventilation and room air  Hydration status: euvolemic  Follow-up not needed.          Vitals Value Taken Time   /92 5/13/2019  5:46 PM   Temp 36.9 °C (98.5 °F) 5/13/2019  5:46 PM   Pulse 72 5/13/2019  5:46 PM   Resp 16 5/13/2019  5:46 PM   SpO2 96 % 5/13/2019  5:46 PM         Event Time     Out of Recovery 17:57:20          Pain/Kay Score: Pain Rating Prior to Med Admin: 8 (5/13/2019  5:30 PM)  Kay Score: 8 (5/13/2019  5:15 PM)

## 2019-05-14 VITALS
SYSTOLIC BLOOD PRESSURE: 142 MMHG | TEMPERATURE: 98 F | OXYGEN SATURATION: 99 % | HEART RATE: 62 BPM | HEIGHT: 66 IN | WEIGHT: 236 LBS | RESPIRATION RATE: 18 BRPM | DIASTOLIC BLOOD PRESSURE: 79 MMHG | BODY MASS INDEX: 37.93 KG/M2

## 2019-05-14 LAB
ANION GAP SERPL CALC-SCNC: 9 MMOL/L (ref 8–16)
BASOPHILS # BLD AUTO: 0 K/UL (ref 0–0.2)
BASOPHILS NFR BLD: 0.2 % (ref 0–1.9)
BUN SERPL-MCNC: 8 MG/DL (ref 6–20)
CALCIUM SERPL-MCNC: 8.9 MG/DL (ref 8.7–10.5)
CHLORIDE SERPL-SCNC: 99 MMOL/L (ref 95–110)
CO2 SERPL-SCNC: 25 MMOL/L (ref 23–29)
CREAT SERPL-MCNC: 0.8 MG/DL (ref 0.5–1.4)
DIFFERENTIAL METHOD: ABNORMAL
EOSINOPHIL # BLD AUTO: 0 K/UL (ref 0–0.5)
EOSINOPHIL NFR BLD: 0.1 % (ref 0–8)
ERYTHROCYTE [DISTWIDTH] IN BLOOD BY AUTOMATED COUNT: 13.3 % (ref 11.5–14.5)
EST. GFR  (AFRICAN AMERICAN): >60 ML/MIN/1.73 M^2
EST. GFR  (NON AFRICAN AMERICAN): >60 ML/MIN/1.73 M^2
GLUCOSE SERPL-MCNC: 134 MG/DL (ref 70–110)
HCT VFR BLD AUTO: 35.5 % (ref 37–48.5)
HGB BLD-MCNC: 11.1 G/DL (ref 12–16)
LYMPHOCYTES # BLD AUTO: 1.1 K/UL (ref 1–4.8)
LYMPHOCYTES NFR BLD: 11.7 % (ref 18–48)
MAGNESIUM SERPL-MCNC: 1.6 MG/DL (ref 1.6–2.6)
MCH RBC QN AUTO: 25.7 PG (ref 27–31)
MCHC RBC AUTO-ENTMCNC: 31.4 G/DL (ref 32–36)
MCV RBC AUTO: 82 FL (ref 82–98)
MONOCYTES # BLD AUTO: 0.6 K/UL (ref 0.3–1)
MONOCYTES NFR BLD: 5.8 % (ref 4–15)
NEUTROPHILS # BLD AUTO: 7.9 K/UL (ref 1.8–7.7)
NEUTROPHILS NFR BLD: 82.2 % (ref 38–73)
PHOSPHATE SERPL-MCNC: 3.9 MG/DL (ref 2.7–4.5)
PLATELET # BLD AUTO: 272 K/UL (ref 150–350)
PMV BLD AUTO: 11.3 FL (ref 9.2–12.9)
POTASSIUM SERPL-SCNC: 4.1 MMOL/L (ref 3.5–5.1)
RBC # BLD AUTO: 4.33 M/UL (ref 4–5.4)
SODIUM SERPL-SCNC: 133 MMOL/L (ref 136–145)
WBC # BLD AUTO: 9.6 K/UL (ref 3.9–12.7)

## 2019-05-14 PROCEDURE — 84100 ASSAY OF PHOSPHORUS: CPT

## 2019-05-14 PROCEDURE — 36415 COLL VENOUS BLD VENIPUNCTURE: CPT

## 2019-05-14 PROCEDURE — S0028 INJECTION, FAMOTIDINE, 20 MG: HCPCS | Performed by: SURGERY

## 2019-05-14 PROCEDURE — 83735 ASSAY OF MAGNESIUM: CPT

## 2019-05-14 PROCEDURE — G8980 MOBILITY D/C STATUS: HCPCS | Mod: CI

## 2019-05-14 PROCEDURE — 80048 BASIC METABOLIC PNL TOTAL CA: CPT

## 2019-05-14 PROCEDURE — 25000003 PHARM REV CODE 250: Performed by: HOSPITALIST

## 2019-05-14 PROCEDURE — 94799 UNLISTED PULMONARY SVC/PX: CPT

## 2019-05-14 PROCEDURE — 63600175 PHARM REV CODE 636 W HCPCS: Performed by: SURGERY

## 2019-05-14 PROCEDURE — G8978 MOBILITY CURRENT STATUS: HCPCS | Mod: CI

## 2019-05-14 PROCEDURE — 97116 GAIT TRAINING THERAPY: CPT

## 2019-05-14 PROCEDURE — G8979 MOBILITY GOAL STATUS: HCPCS | Mod: CI

## 2019-05-14 PROCEDURE — 25000242 PHARM REV CODE 250 ALT 637 W/ HCPCS: Performed by: HOSPITALIST

## 2019-05-14 PROCEDURE — 25000003 PHARM REV CODE 250: Performed by: SURGERY

## 2019-05-14 PROCEDURE — 97161 PT EVAL LOW COMPLEX 20 MIN: CPT

## 2019-05-14 PROCEDURE — 94761 N-INVAS EAR/PLS OXIMETRY MLT: CPT

## 2019-05-14 PROCEDURE — 85025 COMPLETE CBC W/AUTO DIFF WBC: CPT

## 2019-05-14 PROCEDURE — 27000221 HC OXYGEN, UP TO 24 HOURS

## 2019-05-14 RX ORDER — OMEPRAZOLE 20 MG/1
20 CAPSULE, DELAYED RELEASE ORAL DAILY
Qty: 30 CAPSULE | Refills: 3 | Status: SHIPPED | OUTPATIENT
Start: 2019-05-14 | End: 2020-02-27 | Stop reason: SDUPTHER

## 2019-05-14 RX ORDER — HYDROCODONE BITARTRATE AND ACETAMINOPHEN 7.5; 325 MG/1; MG/1
1 TABLET ORAL EVERY 4 HOURS PRN
Qty: 40 TABLET | Refills: 0 | Status: SHIPPED | OUTPATIENT
Start: 2019-05-14 | End: 2019-05-28 | Stop reason: SDUPTHER

## 2019-05-14 RX ORDER — ONDANSETRON 4 MG/1
4 TABLET, ORALLY DISINTEGRATING ORAL EVERY 6 HOURS PRN
Qty: 30 TABLET | Refills: 0 | Status: SHIPPED | OUTPATIENT
Start: 2019-05-14 | End: 2019-08-08

## 2019-05-14 RX ORDER — DIAZEPAM 2 MG/1
2 TABLET ORAL EVERY 6 HOURS PRN
Qty: 20 TABLET | Refills: 0 | Status: SHIPPED | OUTPATIENT
Start: 2019-05-14 | End: 2019-05-28 | Stop reason: SDUPTHER

## 2019-05-14 RX ADMIN — OXYCODONE HYDROCHLORIDE 10 MG: 10 TABLET ORAL at 06:05

## 2019-05-14 RX ADMIN — AMLODIPINE BESYLATE 10 MG: 5 TABLET ORAL at 08:05

## 2019-05-14 RX ADMIN — HYDROMORPHONE HYDROCHLORIDE 1 MG: 2 INJECTION INTRAMUSCULAR; INTRAVENOUS; SUBCUTANEOUS at 08:05

## 2019-05-14 RX ADMIN — FAMOTIDINE 20 MG: 10 INJECTION, SOLUTION INTRAVENOUS at 08:05

## 2019-05-14 RX ADMIN — DOCUSATE SODIUM AND SENNOSIDES 1 TABLET: 8.6; 5 TABLET, FILM COATED ORAL at 08:05

## 2019-05-14 RX ADMIN — ENOXAPARIN SODIUM 40 MG: 100 INJECTION SUBCUTANEOUS at 08:05

## 2019-05-14 RX ADMIN — CEFAZOLIN 2 G: 10 INJECTION, POWDER, FOR SOLUTION INTRAVENOUS; PARENTERAL at 06:05

## 2019-05-14 RX ADMIN — ESCITALOPRAM OXALATE 20 MG: 10 TABLET ORAL at 08:05

## 2019-05-14 RX ADMIN — HYDROMORPHONE HYDROCHLORIDE 1 MG: 2 INJECTION INTRAMUSCULAR; INTRAVENOUS; SUBCUTANEOUS at 01:05

## 2019-05-14 RX ADMIN — FLUTICASONE FUROATE AND VILANTEROL TRIFENATATE 1 PUFF: 100; 25 POWDER RESPIRATORY (INHALATION) at 07:05

## 2019-05-14 RX ADMIN — ATORVASTATIN CALCIUM 80 MG: 40 TABLET, FILM COATED ORAL at 08:05

## 2019-05-14 RX ADMIN — OXYCODONE HYDROCHLORIDE 10 MG: 10 TABLET ORAL at 12:05

## 2019-05-14 NOTE — PROGRESS NOTES
Discharge instructions given. Pt verbalized understanding. New scripts sent to pharmacy. Peripheral IV removed. Pt tolerating clear liquids with no nausea or vomiting. VS stable. Pt transferred to vehicle via wheelchair by staff. All belongings sent.

## 2019-05-14 NOTE — DISCHARGE SUMMARY
[facility] Medicine  Discharge Summary      Patient Name: Juan Carlos Turner  MRN: [id number]  Admission Date: 5/13/2019  Hospital Length of Stay: 1 days  Discharge Date and Time: No discharge date for patient encounter.  Attending Physician: Marla Iverson MD   Discharging Provider: Marla Iverson MD  Primary Care Provider: [facility]      HPI:   Patient is a 43 y.o. female who is referred for evaluation of surgical treatment of morbid obesity. Her Body mass index is 38.09 kg/m². She has known comorbidities of diabetes mellitus, fatty, and hypertension. She has attended the bariatric seminar and  is now status post gastric sleeve surgery.         Procedure(s) (LRB):  GASTRECTOMY, SLEEVE, LAPAROSCOPIC (N/A)      Hospital Course:   Admitted for bariatric sleeve surgery -Post op she tolerated liquids and oral pain medications. Respiratory status and blood sugars remained stable so she is cleared for dc .   Alert, Nad. Lungs clear. abd-soft; ext no edema      Consults:       Final Active Diagnoses:    Diagnosis Date Noted POA    PRINCIPAL PROBLEM:  Morbid (severe) obesity with alveolar hypoventilation [E66.2]  Yes    Vitamin D deficiency, unspecified [E55.9] 05/13/2019 Yes    JOSUE on CPAP [G47.33, Z99.89] 04/25/2019 Not Applicable    Class 2 obesity due to excess calories without serious comorbidity with body mass index (BMI) of 39.0 to 39.9 in adult [E66.09, Z68.39] 03/27/2019 Not Applicable    Hypertension [I10] 03/27/2019 Yes    Type 2 diabetes mellitus with peripheral neuropathy [E11.42] 01/30/2019 Yes      Problems Resolved During this Admission:       Discharged Condition: good    Disposition: Home or Self Care    Follow Up:  Follow-up Information     Jie Montanez MD.    Specialties:  General Surgery, Bariatrics, Surgery  Why:  as scheduled   Contact information:  [address]  [address]  [place] [address] [phone]                 Patient Instructions:       Activity as tolerated   Order Comments: Restrictions as per Dr Montanez       Significant Diagnostic Studies:   na    Pending Diagnostic Studies:     None         Medications:  Reconciled Home Medications:      Medication List      START taking these medications    diazePAM 2 MG tablet  Commonly known as:  VALIUM  Take 1 tablet (2 mg total) by mouth every 6 (six) hours as needed for Anxiety (muscle spasm).     HYDROcodone-acetaminophen 7.5-325 mg per tablet  Commonly known as:  NORCO  Take 1 tablet by mouth every 4 (four) hours as needed for Pain.     omeprazole 20 MG capsule  Commonly known as:  PRILOSEC  Take 1 capsule (20 mg total) by mouth once daily.     ondansetron 4 MG Tbdl  Commonly known as:  ZOFRAN-ODT  Take 1 tablet (4 mg total) by mouth every 6 (six) hours as needed.        CONTINUE taking these medications    * albuterol 2.5 mg /3 mL (0.083 %) nebulizer solution  Commonly known as:  PROVENTIL     * VENTOLIN HFA 90 mcg/actuation inhaler  Generic drug:  albuterol     amLODIPine 10 MG tablet  Commonly known as:  NORVASC  Take 10 mg by mouth once daily.     APPLE CIDER VINEGAR ORAL  Take by mouth.     atorvastatin 80 MG tablet  Commonly known as:  LIPITOR  Take 80 mg by mouth once daily.     BREO ELLIPTA 100-25 mcg/dose diskus inhaler  Generic drug:  fluticasone furoate-vilanterol     escitalopram oxalate 20 MG tablet  Commonly known as:  LEXAPRO  Take 20 mg by mouth once daily.     fluticasone propionate 50 mcg/actuation nasal spray  Commonly known as:  FLONASE  SPRAY ONCE IEN ONCE D     metFORMIN 1000 MG tablet  Commonly known as:  GLUCOPHAGE  Take 1,000 mg by mouth 2 (two) times daily with meals.     multivitamin capsule  Take 1 capsule by mouth once daily.     ONETOUCH DELICA LANCETS 33 gauge Misc  Generic drug:  lancets  TEST ONCE D     ONETOUCH ULTRA BLUE TEST STRIP Strp  Generic drug:  blood sugar diagnostic  TEST ONCE D BEFORE BREAKFAST     ONETOUCH ULTRAMINI kit  Generic drug:  blood-glucose  meter  UTD         * This list has 2 medication(s) that are the same as other medications prescribed for you. Read the directions carefully, and ask your doctor or other care provider to review them with you.                Indwelling Lines/Drains at time of discharge:   Lines/Drains/Airways          None          Time spent on the discharge of patient: 23 minutes  Patient was seen and examined on the date of discharge and determined to be suitable for discharge.         Marla Iverson MD  Department of Hospital Medicine  [facility]

## 2019-05-14 NOTE — PROGRESS NOTES
Progress Note  Gen Surg    Admit Date: 5/13/2019  Attending: Tarik  S/P: Procedure(s) (LRB):  GASTRECTOMY, SLEEVE, LAPAROSCOPIC (N/A)    Post-operative Day: 1 Day Post-Op    Hospital Day: 2    SUBJECTIVE:     Doing well     OBJECTIVE:     Vital Signs (Most Recent)  Temp: 98 °F (36.7 °C) (05/14/19 1200)  Pulse: 62 (05/14/19 1200)  Resp: 18 (05/14/19 1200)  BP: (!) 142/79 (05/14/19 1200)  SpO2: 99 % (05/14/19 1200)    Vital Signs Range (Last 24H):  Temp:  [96.4 °F (35.8 °C)-98.5 °F (36.9 °C)]   Pulse:  [59-79]   Resp:  [16-24]   BP: (106-184)/(61-96)   SpO2:  [96 %-100 %]     I & O (Last 24H):    Intake/Output Summary (Last 24 hours) at 5/14/2019 1624  Last data filed at 5/14/2019 0400  Gross per 24 hour   Intake 941.25 ml   Output 10 ml   Net 931.25 ml       Scheduled medications:   amLODIPine  10 mg Oral Daily    atorvastatin  80 mg Oral Daily    enoxaparin  40 mg Subcutaneous Daily    escitalopram oxalate  20 mg Oral Daily    famotidine (PF)  20 mg Intravenous Q12H    fluticasone furoate-vilanterol  1 puff Inhalation Daily    senna-docusate 8.6-50 mg  1 tablet Oral BID       Physical Exam:  General: no distress  Lungs:  clear to auscultation bilaterally and normal respiratory effort  Heart: regular rate and rhythm, S1, S2 normal, no murmur, rub or gallop  Abdomen: soft, non-tender non-distented; bowel sounds normal; no masses,  no organomegaly    Wound/Incision:  clean, dry, intact    Laboratory:  Labs within the past 24 hours have been reviewed.    ASSESSMENT/PLAN:     Ok to discharge    Jie Montanez MD

## 2019-05-14 NOTE — PT/OT/SLP EVAL
Physical Therapy Evaluation and Discharge Note    Patient Name:  Juan Carlos Turner   MRN:  [id number]    Recommendations:     Discharge Recommendations:  home   Discharge Equipment Recommendations: none   Barriers to discharge: None    Assessment:     Juan Carlos Turner is a 43 y.o. female admitted with a medical diagnosis of Morbid (severe) obesity with alveolar hypoventilation. .  At this time, patient is functioning at their prior level of function and does not require further acute PT services.     Recent Surgery: Procedure(s) (LRB):  GASTRECTOMY, SLEEVE, LAPAROSCOPIC (N/A) 1 Day Post-Op    Plan:     During this hospitalization, patient does not require further acute PT services.  Please re-consult if situation changes.      Subjective     Chief Complaint: pain R side- a lot of scar tissues from previous surgeries  Patient/Family Comments/goals: go home today  Pain/Comfort:  · Pain Rating 1: (did not rate)  · Location - Side 1: Right  · Location 1: abdomen  · Pain Addressed 1: Reposition, Distraction    Patients cultural, spiritual, [employer] conflicts given the current situation:      Living Environment:  Home with family  Prior to admission, patients level of function was independent.  Equipment used at home: none.  DME owned (not currently used): none.  Upon discharge, patient will have assistance from family.    Objective:     Communicated with nurse Kalpan prior to session.  Patient found HOB elevated with peripheral IV upon PT entry to room.    General Precautions: Standard,     Orthopedic Precautions:N/A   Braces: N/A     Exams:  · RLE ROM: WFL  · RLE Strength: WFL  · LLE ROM: WFL  · LLE Strength: WFL    Functional Mobility:  · Bed Mobility:     · Rolling Right: modified independence  · Supine to Sit: modified independence  · Sit to Supine: modified independence  · Transfers:     · Sit to Stand:  independence with no AD  · Toilet Transfer: modified independence with  no AD  using  Stand Pivot  · Gait:  250ft x3 with HHA    AM-PAC 6 CLICK MOBILITY  Total Score:22       Therapeutic Activities and Exercises:   bathroom use to void with mod independence  Back to bed post PT    AM-PAC 6 CLICK MOBILITY  Total Score:22     Patient left HOB elevated with all lines intact, call button in reach and sister present.    GOALS:   Multidisciplinary Problems     Physical Therapy Goals     Not on file                History:     Past Medical History:   Diagnosis Date    Anxiety     Asthma     DDD (degenerative disc disease), cervical     Diabetes mellitus     Hypertension     JOSUE on CPAP        Past Surgical History:   Procedure Laterality Date    GASTRECTOMY, SLEEVE, LAPAROSCOPIC N/A 5/13/2019    [facility]    HYSTERECTOMY      laparoscopic    Injection, Steroid, Epidural LUMBAR L4/5 TESS N/A 10/23/2018    Per[facility]IN [facility]    right knee surgery         Time Tracking:     PT Received On: 05/14/19  PT Start Time: 1108     PT Stop Time: 1125  PT Total Time (min): 17 min     Billable Minutes: Evaluation 8 and Gait Training 9      Snow Brown, PT  05/14/2019

## 2019-05-14 NOTE — ASSESSMENT & PLAN NOTE
Chronic and stable use insulin to scale and continue her oral metformin.  Lab Results   Component Value Date    HGBA1C 6.8 (H) 03/29/2019

## 2019-05-14 NOTE — PROGRESS NOTES
05/14/19 0745   Patient Assessment/Suction   Level of Consciousness (AVPU) alert   Respiratory Effort Unlabored;Normal   Expansion/Accessory Muscles/Retractions no use of accessory muscles;no retractions   Rhythm/Pattern, Respiratory pattern regular;depth regular   Cough Frequency infrequent   PRE-TX-O2   O2 Device (Oxygen Therapy) nasal cannula   $ Is the patient on Low Flow Oxygen? Yes   Flow (L/min) 3   SpO2 100 %  (placed on room air)   Pulse Oximetry Type Intermittent   $ Pulse Oximetry - Multiple Charge Pulse Oximetry - Multiple   Pulse 79   Resp 20   Incentive Spirometer   $ Incentive Spirometer Charges done with encouragement   Incentive Spirometer Predicted Level (mL) 2500   Administration (IS) proper technique demonstrated   Number of Repetitions (IS) 10   Level Incentive Spirometer (mL) 1500   Patient Tolerance (IS) good

## 2019-05-14 NOTE — H&P
[facility] Medicine  History & Physical    Patient Name: Juan Carlos Turner  MRN: [id number]  Admission Date: 5/13/2019  Attending Physician: Marla Iverson MD   Primary Care Provider: [facility]         Patient information was obtained from past medical records.     Subjective:     Principal Problem:Morbid (severe) obesity with alveolar hypoventilation    Chief Complaint:   Chief Complaint   Patient presents with    Obesity        HPI: Patient is a 43 y.o. female who is referred for evaluation of surgical treatment of morbid obesity. Her Body mass index is 38.09 kg/m². She has known comorbidities of diabetes mellitus, fatty, and hypertension. She has attended the bariatric seminar and  is now status post gastric sleeve surgery.         Past Medical History:   Diagnosis Date    Anxiety     Asthma     DDD (degenerative disc disease), cervical     Diabetes mellitus     Hypertension     JOSUE on CPAP        Past Surgical History:   Procedure Laterality Date    HYSTERECTOMY      laparoscopic    Injection, Steroid, Epidural LUMBAR L4/5 TESS N/A 10/23/2018    Performed [facility] at [facility]    right knee surgery         Review of patient's allergies indicates:  No Known Allergies    No current facility-administered medications on file prior to encounter.      Current Outpatient Medications on File Prior to Encounter   Medication Sig    albuterol (PROVENTIL) 2.5 mg /3 mL (0.083 %) nebulizer solution     amlodipine (NORVASC) 10 MG tablet Take 10 mg by mouth once daily.    atorvastatin (LIPITOR) 80 MG tablet Take 80 mg by mouth once daily.     BREO ELLIPTA 100-25 mcg/dose diskus inhaler     fluticasone (FLONASE) 50 mcg/actuation nasal spray SPRAY ONCE IEN ONCE D    metFORMIN (GLUCOPHAGE) 1000 MG tablet Take 1,000 mg by mouth 2 (two) times daily with meals.     VENTOLIN HFA 90 mcg/actuation inhaler     escitalopram oxalate (LEXAPRO) 20 MG tablet Take 20  mg by mouth once daily.    ONETOUCH DELICA LANCETS 33 gauge Misc TEST ONCE D    ONETOUCH ULTRA BLUE TEST STRIP Strp TEST ONCE D BEFORE BREAKFAST    ONETOUCH ULTRAMINI kit UTD     Family History     Problem Relation (Age of Onset)    Diabetes Father        Tobacco Use    Smoking status: Never Smoker    Smokeless tobacco: Never Used   Substance and Sexual Activity    Alcohol use: Yes     Comment: social    Drug use: No    Sexual activity: Not on file     Review of Systems   Unable to perform ROS: Other    Sleepy due to pain medication  Objective:     Vital Signs (Most Recent):  Temp: 98.5 °F (36.9 °C) (05/13/19 1746)  Pulse: 72 (05/13/19 1746)  Resp: 16 (05/13/19 1746)  BP: (!) 156/92 (05/13/19 1746)  SpO2: 96 % (05/13/19 1746) Vital Signs (24h Range):  Temp:  [97.9 °F (36.6 °C)-98.5 °F (36.9 °C)] 98.5 °F (36.9 °C)  Pulse:  [67-78] 72  Resp:  [16-24] 16  SpO2:  [96 %-100 %] 96 %  BP: (122-184)/(74-96) 156/92     Weight: 107 kg (236 lb)  Body mass index is 38.09 kg/m².    Physical Exam   Constitutional:   Obese  female.  She is sleepy  so did not complete full exam.   Nursing note and vitals reviewed.          Significant Labs: All pertinent labs within the past 24 hours have been reviewed.  Lab Results   Component Value Date    WBC 5.84 03/29/2019    HGB 11.6 (L) 03/29/2019    HCT 36.8 (L) 03/29/2019    MCV 83 03/29/2019     03/29/2019     Significant Imaging: na    Assessment/Plan:     * Morbid (severe) obesity with alveolar hypoventilation  Morbid obesity complicates all aspects of disease management from diagnostic modalities to treatment. Weight loss encouraged and health benefits explained to patient.          Vitamin D deficiency, unspecified  Multivitamin and extra Vit D supplements      Morbid obesity        JOSUE on CPAP  Order use of patient's own CPAP machine.      Hypertension  Chronic stable on amlodipine  Adequate pain control will be important and p.r.n.  hydralazine      Class 2 obesity due to excess calories without serious comorbidity with body mass index (BMI) of 39.0 to 39.9 in adult  Status post gastric sleeve today continue postop care   obesity complicates all aspects of disease management from diagnostic modalities to treatment. Weight loss encouraged and health benefits explained to patient.-has attended bariatric seminar           Type 2 diabetes mellitus with peripheral neuropathy  Chronic and stable use insulin to scale and continue her oral metformin.  Lab Results   Component Value Date    HGBA1C 6.8 (H) 03/29/2019           VTE Risk Mitigation (From admission, onward)        Ordered     enoxaparin injection 40 mg  Daily      05/13/19 1633     Place OMAR hose  Until discontinued      05/13/19 1633     Place sequential compression device  Until discontinued      05/13/19 1633     IP VTE HIGH RISK PATIENT  Once      05/13/19 1633             Marla Iverson MD  Department of Hospital Medicine   [facility]

## 2019-05-14 NOTE — PLAN OF CARE
Contacted Dr Montanez's nurse and requested post op appt; on AVS       05/14/19 0151   Final Note   Assessment Type Final Discharge Note   Anticipated Discharge Disposition Home   Hospital Follow Up  Appt(s) scheduled? Yes

## 2019-05-14 NOTE — PLAN OF CARE
Problem: Adult Inpatient Plan of Care  Goal: Plan of Care Review  Outcome: Ongoing (interventions implemented as appropriate)  Pt. Resting quietly with eyes closed between care.   No falls/injuries this shift.   IVF/ABX infusing as ordered w/o problem.   SCD's maintained blanka.   Admin IV/PO pain medicine with moderate pain relief.  Good urine output. Assisted to BR multiple times.  ROM performed while in bed.  Monitoring closely.

## 2019-05-14 NOTE — ASSESSMENT & PLAN NOTE
Status post gastric sleeve today continue postop care   obesity complicates all aspects of disease management from diagnostic modalities to treatment. Weight loss encouraged and health benefits explained to patient.-has attended bariatric seminar

## 2019-05-14 NOTE — ASSESSMENT & PLAN NOTE
Morbid obesity complicates all aspects of disease management from diagnostic modalities to treatment. Weight loss encouraged and health benefits explained to patient.

## 2019-05-14 NOTE — HOSPITAL COURSE
Admitted for bariatric sleeve surgery -Post op she tolerated liquids and oral pain medications. Respiratory status and blood sugars remained stable so she is cleared for dc .   Alert, Nad. Lungs clear. abd-soft; ext no edema

## 2019-05-14 NOTE — PLAN OF CARE
Patient states she sees primary care at the [facility] on [address] in [place].  Verified insurance as [employer] mgd medicare and Medicaid.  Pharmacy is [facility].  Lives with her daughters.  Independent with ADL's.  Denies HH.  Discharge plan is home; no needs.       05/14/19 0951   Discharge Assessment   Assessment Type Discharge Planning Assessment   Confirmed/corrected address and phone number on facesheet? Yes   Assessment information obtained from? Patient   Prior to hospitilization cognitive status: Alert/Oriented   Prior to hospitalization functional status: Independent   Current cognitive status: Alert/Oriented   Current Functional Status: Independent   Lives With child(real), adult   Able to Return to Prior Arrangements yes   Is patient able to care for self after discharge? Yes   Patient's perception of discharge disposition home or selfcare   Readmission Within the Last 30 Days no previous admission in last 30 days   Patient currently being followed by outpatient case management? No   Patient currently receives any other outside agency services? No   Equipment Currently Used at Home CPAP;nebulizer;glucometer   Do you have any problems affording any of your prescribed medications? No   Is the patient taking medications as prescribed? yes   Does the patient have transportation home? Yes   Transportation Anticipated family or friend will provide   Dialysis Name and Scheduled days none   Does the patient receive services at the [facility]madin Clinic? No   Discharge Plan A Home   DME Needed Upon Discharge  none   Patient/Family in Agreement with Plan yes

## 2019-05-14 NOTE — HPI
Patient is a 43 y.o. female who is referred for evaluation of surgical treatment of morbid obesity. Her Body mass index is 38.09 kg/m². She has known comorbidities of diabetes mellitus, fatty, and hypertension. She has attended the bariatric seminar and  is now status post gastric sleeve surgery.

## 2019-05-14 NOTE — SUBJECTIVE & OBJECTIVE
Past Medical History:   Diagnosis Date    Anxiety     Asthma     DDD (degenerative disc disease), cervical     Diabetes mellitus     Hypertension     JOSUE on CPAP        Past Surgical History:   Procedure Laterality Date    HYSTERECTOMY      laparoscopic    Injection, Steroid, Epidural LUMBAR L4/5 TESS N/A 10/23/2018    Performed by Ed Pina MD at [facility] PAIN MGT    right knee surgery         Review of patient's allergies indicates:  No Known Allergies    No current facility-administered medications on file prior to encounter.      Current Outpatient Medications on File Prior to Encounter   Medication Sig    albuterol (PROVENTIL) 2.5 mg /3 mL (0.083 %) nebulizer solution     amlodipine (NORVASC) 10 MG tablet Take 10 mg by mouth once daily.    atorvastatin (LIPITOR) 80 MG tablet Take 80 mg by mouth once daily.     BREO ELLIPTA 100-25 mcg/dose diskus inhaler     fluticasone (FLONASE) 50 mcg/actuation nasal spray SPRAY ONCE IEN ONCE D    metFORMIN (GLUCOPHAGE) 1000 MG tablet Take 1,000 mg by mouth 2 (two) times daily with meals.     VENTOLIN HFA 90 mcg/actuation inhaler     escitalopram oxalate (LEXAPRO) 20 MG tablet Take 20 mg by mouth once daily.    ONETOUCH DELICA LANCETS 33 gauge Misc TEST ONCE D    ONETOUCH ULTRA BLUE TEST STRIP Strp TEST ONCE D BEFORE BREAKFAST    ONETOUCH ULTRAMINI kit UTD     Family History     Problem Relation (Age of Onset)    Diabetes Father        Tobacco Use    Smoking status: Never Smoker    Smokeless tobacco: Never Used   Substance and Sexual Activity    Alcohol use: Yes     Comment: social    Drug use: No    Sexual activity: Not on file     Review of Systems   Unable to perform ROS: Other    Sleepy due to pain medication  Objective:     Vital Signs (Most Recent):  Temp: 98.5 °F (36.9 °C) (05/13/19 1746)  Pulse: 72 (05/13/19 1746)  Resp: 16 (05/13/19 1746)  BP: (!) 156/92 (05/13/19 1746)  SpO2: 96 % (05/13/19 1746) Vital Signs (24h Range):  Temp:  [97.9 °F  (36.6 °C)-98.5 °F (36.9 °C)] 98.5 °F (36.9 °C)  Pulse:  [67-78] 72  Resp:  [16-24] 16  SpO2:  [96 %-100 %] 96 %  BP: (122-184)/(74-96) 156/92     Weight: 107 kg (236 lb)  Body mass index is 38.09 kg/m².    Physical Exam   Constitutional:   Obese  female.  She is sleepy  so did not complete full exam.   Nursing note and vitals reviewed.          Significant Labs: All pertinent labs within the past 24 hours have been reviewed.  Lab Results   Component Value Date    WBC 5.84 03/29/2019    HGB 11.6 (L) 03/29/2019    HCT 36.8 (L) 03/29/2019    MCV 83 03/29/2019     03/29/2019     Significant Imaging: na

## 2019-05-14 NOTE — DISCHARGE INSTRUCTIONS
Remove band aids tonight  Keep white strips until they fall off  Shower over incisions daily with soap and water  Do not bath or get into a pool  Stage 1a of diet packet starts today  Use Incentive Spirometer at Home

## 2019-05-15 ENCOUNTER — PATIENT OUTREACH (OUTPATIENT)
Dept: ADMINISTRATIVE | Facility: CLINIC | Age: 44
End: 2019-05-15

## 2019-05-15 NOTE — PATIENT INSTRUCTIONS
Discharge Instructions for Gastrectomy  You had a gastrectomy. During this surgery, some or all of your stomach was removed. As you heal from surgery, heres what youll need to know to care for yourself.  Eating and drinking  · Follow the diet that was prescribed for you in the hospital. Eat pureed foods and liquids for 3 weeks after the surgery.  · Drink liquids in smaller amounts than you used to. This will make it easier for your body to digest liquids. But, it is important that you continue to drink liquids (in small amounts) so that you do not become dehydrated. Some signs of dehydration include dry mouth and dark urine.  · Eat slowly. Eating too much or too fast will cause nausea and vomiting.  · Liquids and solids may need to be eaten separately.  · Use liquid nutritional supplements recommended by a health care provider to make sure you get enough calories.  · Try to eat small, frequent high protein low carbohydrate meals when you are eating solids again.  Activity  · Remember, recovery takes several weeks. It is common to feel tired. Rest as needed.  · Walk as often as you feel able. Increase your activity slowly.  · Do not lift anything heavier than 10 pounds until the healthcare provider says it's OK.  · Avoid strenuous chores, such as vacuuming or lifting full bags of garbage, until the healthcare provider says its OK.  · Climb stairs slowly and pause after every few steps.  · Do not drive for 2 weeks after surgery.  · Start an exercise program 1 week after discharge. You can benefit from simple activities such as walking or gardening. Ask your healthcare provider how to get started.  · Ask your healthcare provider when you can expect to return to work.  Other home care  · Continue the coughing and deep breathing exercises that you learned in the hospital.  · Shower as needed. But avoid baths, swimming pools, and hot tubs until your healthcare provider says they are OK. This helps prevent infection of  the incision site.  · Keep the incision clean and dry. Wash the incision gently with mild soap and warm water. Then gently pat the incision dry with a towel.  · Follow your healthcare providers instructions about caring for the dressing covering your incisions.  · If your healthcare provider used small white adhesive strips to close the incision, do not remove them. Let the strips fall off on their own. If they dont come off within 2 weeks after you were sent home, call your healthcare provider.  · Take your medicines in crushed or liquid form for 3 weeks after surgery.  · Take a chewable vitamin 2 times a day. Ask your healthcare provider if you also need to take a supplement for vitamin B12.  · Take all medicines as directed by your healthcare provider.  Follow-up care  Follow up with your healthcare provider, or as advised.     When to call your healthcare provider  Call your healthcare provider right away if you have any of the following:  · Cloudy or smelly drainage from the incision site  · Fever of 100.4°F (38°C) or higher, or as directed by your healthcare provider  · Shaking chills  · Fast pulse  · Night sweats  · Pain, nausea, or vomiting that keeps occurring after you eat  · Diarrhea beyond the first week after discharge  · Pain in your upper back, chest, or left shoulder  · Hiccups that wont stop or that keep coming back  · Confusion, depression, or unusual fatigue  · Signs of bladder infection. These include urinating more often than usual, and burning, pain, bleeding, or hesitancy when you urinate.   Date Last Reviewed: 10/1/2016  © 7660-2625 [employer]. [address] [place] [address], [place] [address]. All rights reserved. This information is not intended as a substitute for professional medical care. Always follow your healthcare professional's instructions.

## 2019-05-28 ENCOUNTER — OFFICE VISIT (OUTPATIENT)
Dept: BARIATRICS | Facility: CLINIC | Age: 44
End: 2019-05-28
Payer: MEDICARE

## 2019-05-28 VITALS
HEART RATE: 80 BPM | BODY MASS INDEX: 36.03 KG/M2 | HEIGHT: 66 IN | DIASTOLIC BLOOD PRESSURE: 69 MMHG | SYSTOLIC BLOOD PRESSURE: 108 MMHG | RESPIRATION RATE: 16 BRPM | TEMPERATURE: 99 F | WEIGHT: 224.19 LBS

## 2019-05-28 DIAGNOSIS — I10 HYPERTENSION, UNSPECIFIED TYPE: ICD-10-CM

## 2019-05-28 DIAGNOSIS — E66.01 MORBID OBESITY: Primary | ICD-10-CM

## 2019-05-28 DIAGNOSIS — E66.09 CLASS 2 OBESITY DUE TO EXCESS CALORIES WITHOUT SERIOUS COMORBIDITY WITH BODY MASS INDEX (BMI) OF 39.0 TO 39.9 IN ADULT: ICD-10-CM

## 2019-05-28 DIAGNOSIS — E11.42 TYPE 2 DIABETES MELLITUS WITH PERIPHERAL NEUROPATHY: ICD-10-CM

## 2019-05-28 DIAGNOSIS — G47.33 OSA ON CPAP: ICD-10-CM

## 2019-05-28 PROCEDURE — 99999 PR PBB SHADOW E&M-EST. PATIENT-LVL IV: CPT | Mod: PBBFAC,,, | Performed by: SURGERY

## 2019-05-28 PROCEDURE — 99024 POSTOP FOLLOW-UP VISIT: CPT | Mod: S$GLB,,, | Performed by: SURGERY

## 2019-05-28 PROCEDURE — 99024 PR POST-OP FOLLOW-UP VISIT: ICD-10-PCS | Mod: S$GLB,,, | Performed by: SURGERY

## 2019-05-28 PROCEDURE — 99999 PR PBB SHADOW E&M-EST. PATIENT-LVL IV: ICD-10-PCS | Mod: PBBFAC,,, | Performed by: SURGERY

## 2019-05-28 RX ORDER — DIAZEPAM 2 MG/1
2 TABLET ORAL EVERY 6 HOURS PRN
Qty: 20 TABLET | Refills: 0 | Status: SHIPPED | OUTPATIENT
Start: 2019-05-28 | End: 2019-08-08

## 2019-05-28 RX ORDER — HYDROCODONE BITARTRATE AND ACETAMINOPHEN 7.5; 325 MG/1; MG/1
1 TABLET ORAL EVERY 4 HOURS PRN
Qty: 40 TABLET | Refills: 0 | Status: SHIPPED | OUTPATIENT
Start: 2019-05-28 | End: 2019-08-08

## 2019-05-28 RX ORDER — GLIPIZIDE 5 MG/1
TABLET ORAL
COMMUNITY
Start: 2019-05-14 | End: 2022-01-20

## 2019-05-28 NOTE — PATIENT INSTRUCTIONS
Diet: continue protocol  Exercise: ok for cardio, no heavy lifting over 30 pounds for another month  Vitamins: 2 mvi daily, calcium, and b complex

## 2019-05-28 NOTE — PROGRESS NOTES
Post Op Note    Surgery: gastric sleeve surgery  Date: 5/13/19  Initial weight: 240  Last weight: 236  Current weight: 224    Constipation: none  Reflux: none  Vomiting: none    Diet:    2 protein shakes  1 soup per day    Exercise:  walking    MVI: mvi, lata, b12    Vitals:    05/28/19 0854   BP: 108/69   Pulse: 80   Resp: 16   Temp: 98.5 °F (36.9 °C)       Body comp:  Fat Percent:  48.2 %  Fat Mass:  108 lb  FFM:  116.2 lb  TBW: 84.6 lb  TBW %:  37.7 %  BMR: 1662 kcal    PE:  NAD  RRR  Soft/nt/nd  Incisions: well healed    Labs: none    A/P: s/p sleeve     Counseling for patient:    Diet: continue protocol  Exercise: ok for cardio, no heavy lifting over 30 pounds for another month  Vitamins: 2 mvi daily, calcium, and b complex    Co morbidities:     1. Morbid obesity     2. Class 2 obesity due to excess calories without serious comorbidity with body mass index (BMI) of 39.0 to 39.9 in adult     3. Hypertension, unspecified type     4. Type 2 diabetes mellitus with peripheral neuropathy     5. JOSUE on CPAP         : I met with the patient for 15 minutes and counseled her for over 50% of that time

## 2019-06-04 ENCOUNTER — TELEPHONE (OUTPATIENT)
Dept: SLEEP MEDICINE | Facility: CLINIC | Age: 44
End: 2019-06-04

## 2019-06-27 ENCOUNTER — OFFICE VISIT (OUTPATIENT)
Dept: BARIATRICS | Facility: CLINIC | Age: 44
End: 2019-06-27
Payer: MEDICARE

## 2019-06-27 VITALS
HEART RATE: 55 BPM | HEIGHT: 66 IN | WEIGHT: 217.63 LBS | BODY MASS INDEX: 34.98 KG/M2 | RESPIRATION RATE: 16 BRPM | SYSTOLIC BLOOD PRESSURE: 112 MMHG | TEMPERATURE: 98 F | DIASTOLIC BLOOD PRESSURE: 80 MMHG

## 2019-06-27 DIAGNOSIS — E11.42 TYPE 2 DIABETES MELLITUS WITH PERIPHERAL NEUROPATHY: ICD-10-CM

## 2019-06-27 DIAGNOSIS — E61.1 IRON DEFICIENCY: ICD-10-CM

## 2019-06-27 DIAGNOSIS — E66.2 MORBID (SEVERE) OBESITY WITH ALVEOLAR HYPOVENTILATION: Primary | ICD-10-CM

## 2019-06-27 DIAGNOSIS — G47.33 OSA ON CPAP: ICD-10-CM

## 2019-06-27 DIAGNOSIS — E66.01 MORBID OBESITY: ICD-10-CM

## 2019-06-27 DIAGNOSIS — E53.8 B12 DEFICIENCY: ICD-10-CM

## 2019-06-27 PROCEDURE — 99024 POSTOP FOLLOW-UP VISIT: CPT | Mod: S$GLB,,, | Performed by: SURGERY

## 2019-06-27 PROCEDURE — 99999 PR PBB SHADOW E&M-EST. PATIENT-LVL IV: ICD-10-PCS | Mod: PBBFAC,,, | Performed by: SURGERY

## 2019-06-27 PROCEDURE — 99999 PR PBB SHADOW E&M-EST. PATIENT-LVL IV: CPT | Mod: PBBFAC,,, | Performed by: SURGERY

## 2019-06-27 PROCEDURE — 99024 PR POST-OP FOLLOW-UP VISIT: ICD-10-PCS | Mod: S$GLB,,, | Performed by: SURGERY

## 2019-06-27 NOTE — PROGRESS NOTES
Post Op Note    Surgery: gastric sleeve surgery  Date: 5/13/19  Initial weight: 240  Last weight: 224  Current weight: 217    Constipation: none  Reflux: none  Vomiting: none    Still some left sided pain worse with movement    Diet:  Breakfast:  Protein shake or egg  Lunch: baked fish- 3 spoons and full  Dinner: meat and protein shake  Protein shake: premier    Exercise:  Walking some- gets painful    MVI: 2 mvi, calcium, b12    Vitals:    06/27/19 1031   BP: 112/80   Pulse: (!) 55   Resp: 16   Temp: 98.2 °F (36.8 °C)       Body comp:  Fat Percent:  46.2 %  Fat Mass:  100.6 lb  FFM:  117 lb  TBW: 85 lb  TBW %:  39.1 %  BMR: 1659 kcal    PE:  NAD  RRR  Soft/nt/nd  Incisions: well healed    Labs: none    A/P: s/p sleeve     Counseling for patient:    Diet: doing well, continue low carb dieting  Exercise: no restriction  Vitamins: 2 mvi, calcium, b12  Co morbidities:     1. Morbid (severe) obesity with alveolar hypoventilation  CBC w/ Auto Differential    CMP    B1    Lipid Panel   2. Morbid obesity     3. Type 2 diabetes mellitus with peripheral neuropathy     4. JOSUE on CPAP     5. B12 deficiency  B12   6. Iron deficiency  Iron Studies       -All above: Evaluated, monitored, and treated with diet and exercise program.        : I met with the patient for 15 minutes and counseled her for over 50% of that time

## 2019-08-01 ENCOUNTER — LAB VISIT (OUTPATIENT)
Dept: LAB | Facility: OTHER | Age: 44
End: 2019-08-01
Attending: SURGERY
Payer: MEDICARE

## 2019-08-01 DIAGNOSIS — E66.2 MORBID (SEVERE) OBESITY WITH ALVEOLAR HYPOVENTILATION: ICD-10-CM

## 2019-08-01 DIAGNOSIS — E53.8 B12 DEFICIENCY: ICD-10-CM

## 2019-08-01 DIAGNOSIS — E61.1 IRON DEFICIENCY: ICD-10-CM

## 2019-08-01 LAB
ALBUMIN SERPL BCP-MCNC: 3.6 G/DL (ref 3.5–5.2)
ALP SERPL-CCNC: 81 U/L (ref 55–135)
ALT SERPL W/O P-5'-P-CCNC: 14 U/L (ref 10–44)
ANION GAP SERPL CALC-SCNC: 10 MMOL/L (ref 8–16)
AST SERPL-CCNC: 13 U/L (ref 10–40)
BASOPHILS # BLD AUTO: 0.03 K/UL (ref 0–0.2)
BASOPHILS NFR BLD: 0.5 % (ref 0–1.9)
BILIRUB SERPL-MCNC: 0.7 MG/DL (ref 0.1–1)
BUN SERPL-MCNC: 6 MG/DL (ref 6–20)
CALCIUM SERPL-MCNC: 9.1 MG/DL (ref 8.7–10.5)
CHLORIDE SERPL-SCNC: 104 MMOL/L (ref 95–110)
CHOLEST SERPL-MCNC: 182 MG/DL (ref 120–199)
CHOLEST/HDLC SERPL: 3.8 {RATIO} (ref 2–5)
CO2 SERPL-SCNC: 24 MMOL/L (ref 23–29)
CREAT SERPL-MCNC: 0.8 MG/DL (ref 0.5–1.4)
DIFFERENTIAL METHOD: ABNORMAL
EOSINOPHIL # BLD AUTO: 0.5 K/UL (ref 0–0.5)
EOSINOPHIL NFR BLD: 8.5 % (ref 0–8)
ERYTHROCYTE [DISTWIDTH] IN BLOOD BY AUTOMATED COUNT: 14 % (ref 11.5–14.5)
EST. GFR  (AFRICAN AMERICAN): >60 ML/MIN/1.73 M^2
EST. GFR  (NON AFRICAN AMERICAN): >60 ML/MIN/1.73 M^2
GLUCOSE SERPL-MCNC: 115 MG/DL (ref 70–110)
HCT VFR BLD AUTO: 37.8 % (ref 37–48.5)
HDLC SERPL-MCNC: 48 MG/DL (ref 40–75)
HDLC SERPL: 26.4 % (ref 20–50)
HGB BLD-MCNC: 11.6 G/DL (ref 12–16)
IMM GRANULOCYTES # BLD AUTO: 0.01 K/UL (ref 0–0.04)
IMM GRANULOCYTES NFR BLD AUTO: 0.2 % (ref 0–0.5)
IRON SERPL-MCNC: 92 UG/DL (ref 30–160)
LDLC SERPL CALC-MCNC: 120.8 MG/DL (ref 63–159)
LYMPHOCYTES # BLD AUTO: 2.3 K/UL (ref 1–4.8)
LYMPHOCYTES NFR BLD: 41.3 % (ref 18–48)
MCH RBC QN AUTO: 25.6 PG (ref 27–31)
MCHC RBC AUTO-ENTMCNC: 30.7 G/DL (ref 32–36)
MCV RBC AUTO: 83 FL (ref 82–98)
MONOCYTES # BLD AUTO: 0.5 K/UL (ref 0.3–1)
MONOCYTES NFR BLD: 8.4 % (ref 4–15)
NEUTROPHILS # BLD AUTO: 2.3 K/UL (ref 1.8–7.7)
NEUTROPHILS NFR BLD: 41.1 % (ref 38–73)
NONHDLC SERPL-MCNC: 134 MG/DL
NRBC BLD-RTO: 0 /100 WBC
PLATELET # BLD AUTO: 255 K/UL (ref 150–350)
PMV BLD AUTO: 12.6 FL (ref 9.2–12.9)
POTASSIUM SERPL-SCNC: 3.7 MMOL/L (ref 3.5–5.1)
PROT SERPL-MCNC: 6.9 G/DL (ref 6–8.4)
RBC # BLD AUTO: 4.53 M/UL (ref 4–5.4)
SATURATED IRON: 25 % (ref 20–50)
SODIUM SERPL-SCNC: 138 MMOL/L (ref 136–145)
TOTAL IRON BINDING CAPACITY: 370 UG/DL (ref 250–450)
TRANSFERRIN SERPL-MCNC: 250 MG/DL (ref 200–375)
TRIGL SERPL-MCNC: 66 MG/DL (ref 30–150)
VIT B12 SERPL-MCNC: 808 PG/ML (ref 210–950)
WBC # BLD AUTO: 5.62 K/UL (ref 3.9–12.7)

## 2019-08-01 PROCEDURE — 84425 ASSAY OF VITAMIN B-1: CPT

## 2019-08-01 PROCEDURE — 80053 COMPREHEN METABOLIC PANEL: CPT

## 2019-08-01 PROCEDURE — 85025 COMPLETE CBC W/AUTO DIFF WBC: CPT

## 2019-08-01 PROCEDURE — 80061 LIPID PANEL: CPT

## 2019-08-01 PROCEDURE — 83540 ASSAY OF IRON: CPT

## 2019-08-01 PROCEDURE — 36415 COLL VENOUS BLD VENIPUNCTURE: CPT

## 2019-08-01 PROCEDURE — 82607 VITAMIN B-12: CPT

## 2019-08-03 LAB — VIT B1 BLD-MCNC: 35 UG/L (ref 38–122)

## 2019-08-08 ENCOUNTER — OFFICE VISIT (OUTPATIENT)
Dept: BARIATRICS | Facility: CLINIC | Age: 44
End: 2019-08-08
Payer: MEDICARE

## 2019-08-08 VITALS
BODY MASS INDEX: 33.72 KG/M2 | HEIGHT: 66 IN | TEMPERATURE: 98 F | WEIGHT: 209.81 LBS | SYSTOLIC BLOOD PRESSURE: 121 MMHG | HEART RATE: 72 BPM | RESPIRATION RATE: 16 BRPM | DIASTOLIC BLOOD PRESSURE: 81 MMHG

## 2019-08-08 DIAGNOSIS — E53.8 B12 DEFICIENCY: ICD-10-CM

## 2019-08-08 DIAGNOSIS — E11.42 TYPE 2 DIABETES MELLITUS WITH PERIPHERAL NEUROPATHY: ICD-10-CM

## 2019-08-08 DIAGNOSIS — E61.1 IRON DEFICIENCY: ICD-10-CM

## 2019-08-08 DIAGNOSIS — I10 HYPERTENSION, UNSPECIFIED TYPE: ICD-10-CM

## 2019-08-08 DIAGNOSIS — E66.09 CLASS 2 OBESITY DUE TO EXCESS CALORIES WITHOUT SERIOUS COMORBIDITY WITH BODY MASS INDEX (BMI) OF 39.0 TO 39.9 IN ADULT: ICD-10-CM

## 2019-08-08 DIAGNOSIS — E55.9 VITAMIN D DEFICIENCY: ICD-10-CM

## 2019-08-08 DIAGNOSIS — E66.01 MORBID OBESITY: Primary | ICD-10-CM

## 2019-08-08 PROCEDURE — 3044F PR MOST RECENT HEMOGLOBIN A1C LEVEL <7.0%: ICD-10-PCS | Mod: CPTII,S$GLB,, | Performed by: SURGERY

## 2019-08-08 PROCEDURE — 3074F PR MOST RECENT SYSTOLIC BLOOD PRESSURE < 130 MM HG: ICD-10-PCS | Mod: CPTII,S$GLB,, | Performed by: SURGERY

## 2019-08-08 PROCEDURE — 3074F SYST BP LT 130 MM HG: CPT | Mod: CPTII,S$GLB,, | Performed by: SURGERY

## 2019-08-08 PROCEDURE — 99024 POSTOP FOLLOW-UP VISIT: CPT | Mod: S$GLB,,, | Performed by: SURGERY

## 2019-08-08 PROCEDURE — 99024 PR POST-OP FOLLOW-UP VISIT: ICD-10-PCS | Mod: S$GLB,,, | Performed by: SURGERY

## 2019-08-08 PROCEDURE — 3008F PR BODY MASS INDEX (BMI) DOCUMENTED: ICD-10-PCS | Mod: CPTII,S$GLB,, | Performed by: SURGERY

## 2019-08-08 PROCEDURE — 3044F HG A1C LEVEL LT 7.0%: CPT | Mod: CPTII,S$GLB,, | Performed by: SURGERY

## 2019-08-08 PROCEDURE — 3079F DIAST BP 80-89 MM HG: CPT | Mod: CPTII,S$GLB,, | Performed by: SURGERY

## 2019-08-08 PROCEDURE — 3079F PR MOST RECENT DIASTOLIC BLOOD PRESSURE 80-89 MM HG: ICD-10-PCS | Mod: CPTII,S$GLB,, | Performed by: SURGERY

## 2019-08-08 PROCEDURE — 3008F BODY MASS INDEX DOCD: CPT | Mod: CPTII,S$GLB,, | Performed by: SURGERY

## 2019-08-08 PROCEDURE — 99999 PR PBB SHADOW E&M-EST. PATIENT-LVL III: CPT | Mod: PBBFAC,,, | Performed by: SURGERY

## 2019-08-08 PROCEDURE — 99999 PR PBB SHADOW E&M-EST. PATIENT-LVL III: ICD-10-PCS | Mod: PBBFAC,,, | Performed by: SURGERY

## 2019-08-08 RX ORDER — DIAZEPAM 2 MG/1
2 TABLET ORAL EVERY 6 HOURS PRN
Qty: 20 TABLET | Refills: 0 | Status: SHIPPED | OUTPATIENT
Start: 2019-08-08 | End: 2020-08-07

## 2019-08-08 RX ORDER — ONDANSETRON 4 MG/1
4 TABLET, ORALLY DISINTEGRATING ORAL EVERY 6 HOURS PRN
Qty: 30 TABLET | Refills: 0 | OUTPATIENT
Start: 2019-08-08 | End: 2020-03-24

## 2019-08-08 RX ORDER — HYDROCODONE BITARTRATE AND ACETAMINOPHEN 7.5; 325 MG/1; MG/1
1 TABLET ORAL EVERY 4 HOURS PRN
Qty: 40 TABLET | Refills: 0 | OUTPATIENT
Start: 2019-08-08 | End: 2020-03-10

## 2019-08-08 NOTE — PROGRESS NOTES
Post Op Note    Surgery: gastric sleeve surgery  Date: 5/13/19  Initial weight: 240  Last weight: 217  Current weight: 209    Constipation: none  Reflux: none  Vomiting: twice    Still having left sided pain worse with movement    Diet:  Breakfast:  Protein shake  Lunch: skips or another 1/2 protein shake  Dinner: baked chicken and broccoli   Snack: broccoli        Exercise:  Walking 4 blocks per day     MVI: 2 mvi, calcium, b12    Vitals:    08/08/19 0923   BP: 121/81   Pulse: 72   Resp: 16   Temp: 98.3 °F (36.8 °C)       Body comp:  Fat Percent:  44.6 %  Fat Mass:  93.6 lb  FFM:  116.2 lb  TBW: 84 lb  TBW %:  40 %  BMR: 1637 kcal    PE:  NAD  RRR  Soft/nt/nd  Incisions: well healed    Labs: reviewed    A/P: s/p sleeve     Counseling for patient:    Diet: low carb dieting, protein shakes are fine  Exercise: try increase exercise intensity   Vitamins: add b1 to regimen    Co morbidities:     1. Morbid obesity  CBC w/ Auto Differential    CMP    B1    Lipid Panel   2. Type 2 diabetes mellitus with peripheral neuropathy  Hemoglobin A1c   3. Class 2 obesity due to excess calories without serious comorbidity with body mass index (BMI) of 39.0 to 39.9 in adult     4. Hypertension, unspecified type     5. B12 deficiency  B12   6. Iron deficiency  Iron Studies   7. Vitamin D deficiency  Vitamin D 25 Hydroxy       -All above: Evaluated, monitored, and treated with diet and exercise program.        : I met with the patient for 15 minutes and counseled her for over 50% of that time

## 2019-09-05 ENCOUNTER — HOSPITAL ENCOUNTER (EMERGENCY)
Facility: OTHER | Age: 44
Discharge: HOME | End: 2019-09-06
Attending: EMERGENCY MEDICINE
Payer: MEDICARE

## 2019-09-05 DIAGNOSIS — R05.9 COUGH: ICD-10-CM

## 2019-09-05 DIAGNOSIS — J06.9 VIRAL URI WITH COUGH: Primary | ICD-10-CM

## 2019-09-05 PROCEDURE — 25000242 PHARM REV CODE 250 ALT 637 W/ HCPCS: Performed by: PHYSICIAN ASSISTANT

## 2019-09-05 PROCEDURE — 94640 AIRWAY INHALATION TREATMENT: CPT

## 2019-09-05 PROCEDURE — 99284 EMERGENCY DEPT VISIT MOD MDM: CPT | Mod: 25

## 2019-09-05 RX ORDER — IPRATROPIUM BROMIDE AND ALBUTEROL SULFATE 2.5; .5 MG/3ML; MG/3ML
3 SOLUTION RESPIRATORY (INHALATION)
Status: COMPLETED | OUTPATIENT
Start: 2019-09-05 | End: 2019-09-05

## 2019-09-05 RX ADMIN — IPRATROPIUM BROMIDE AND ALBUTEROL SULFATE 3 ML: .5; 3 SOLUTION RESPIRATORY (INHALATION) at 10:09

## 2019-09-06 VITALS
RESPIRATION RATE: 18 BRPM | DIASTOLIC BLOOD PRESSURE: 85 MMHG | TEMPERATURE: 99 F | OXYGEN SATURATION: 100 % | WEIGHT: 207.25 LBS | HEART RATE: 63 BPM | HEIGHT: 66 IN | BODY MASS INDEX: 33.31 KG/M2 | SYSTOLIC BLOOD PRESSURE: 129 MMHG

## 2019-09-06 RX ORDER — FLUTICASONE PROPIONATE 50 MCG
1 SPRAY, SUSPENSION (ML) NASAL 2 TIMES DAILY PRN
Qty: 15 G | Refills: 0 | OUTPATIENT
Start: 2019-09-06 | End: 2020-03-24

## 2019-09-06 RX ORDER — BENZONATATE 100 MG/1
100 CAPSULE ORAL 3 TIMES DAILY PRN
Qty: 20 CAPSULE | Refills: 0 | Status: SHIPPED | OUTPATIENT
Start: 2019-09-06 | End: 2019-09-16

## 2019-09-06 NOTE — ED PROVIDER NOTES
Encounter Date: 9/5/2019       History     Chief Complaint   Patient presents with    URI     w/ sore throat, stuffy/runny/burning nose and sinus infection x 3 days     Patient is a 43-year-old female with a past medical history of hypertension, diabetes, JOSUE on CPAP, asthma, presenting to the emergency department for evaluation of sore throat, cough, shortness of breath. Patient states her symptoms started about 3 days ago.  She states that she feels that she has a sinus infection and feels congestion as well as a sore throat.  She also states that she has had a productive cough with sputum.  She states that she feels like her asthma is acting up, but has tried multiple home nebulizer treatments with no improvement.  She states most of her symptoms worsen with sleeping.  She denies any fever chills.  She has not taken any new medication for symptoms.This is the extent of the patient's complaints at this time.       The history is provided by the patient.     Review of patient's allergies indicates:  No Known Allergies  Past Medical History:   Diagnosis Date    Anxiety     Asthma     DDD (degenerative disc disease), cervical     Diabetes mellitus     Hypertension     JOSUE on CPAP      Past Surgical History:   Procedure Laterality Date    GASTRECTOMY, SLEEVE, LAPAROSCOPIC N/A 5/13/2019    Per[facility]    HYSTERECTOMY      laparoscopic    Injection, Steroid, Epidural LUMBAR L4/5 TESS N/A 10/23/2018    Performed by Ed Pina MD at [facility] [place] [place] [facility]    right knee surgery       Family History   Problem Relation Age of Onset    Diabetes Father      Social History     Tobacco Use    Smoking status: Never Smoker    Smokeless tobacco: Never Used   Substance Use Topics    Alcohol use: Yes     Comment: social    Drug use: No     Review of Systems   Constitutional: Negative for activity change, appetite change, chills, fatigue and fever.   HENT: Positive for congestion,  rhinorrhea and sore throat.    Eyes: Negative for photophobia and visual disturbance.   Respiratory: Positive for cough and shortness of breath. Negative for wheezing.    Cardiovascular: Negative for chest pain.   Gastrointestinal: Negative for abdominal pain, diarrhea, nausea and vomiting.   Genitourinary: Negative for dysuria, hematuria and urgency.   Musculoskeletal: Negative for back pain, myalgias and neck pain.   Skin: Negative for color change and wound.   Neurological: Negative for weakness and headaches.   Psychiatric/Behavioral: Negative for agitation and confusion.       Physical Exam     Initial Vitals [09/05/19 2206]   BP Pulse Resp Temp SpO2   137/84 (!) 58 16 98.1 °F (36.7 °C) 99 %      MAP       --         Physical Exam    Nursing note and vitals reviewed.  Constitutional: She appears well-developed and well-nourished. She is not diaphoretic. She is cooperative.  Non-toxic appearance. She does not have a sickly appearance. No distress.   Well-appearing,  female accompanied by her daughter who is also a patient in the emergency room.  Speaking clearly full sentences.  No acute distress.   HENT:   Head: Normocephalic and atraumatic.   Right Ear: Hearing, tympanic membrane, external ear and ear canal normal.   Left Ear: Hearing, tympanic membrane, external ear and ear canal normal.   Nose: Mucosal edema present.   Mouth/Throat: Uvula is midline, oropharynx is clear and moist and mucous membranes are normal.   Eyes: Conjunctivae and EOM are normal.   Neck: Normal range of motion. Neck supple.   Cardiovascular: Normal rate, regular rhythm and normal heart sounds.   Pulmonary/Chest: Breath sounds normal. No respiratory distress. She has no wheezes.   No audible wheezing, no increased work of breathing.  No accessory muscle usage or respiratory distress.   Musculoskeletal: Normal range of motion.   Neurological: She is alert and oriented to person, place, and time. GCS eye subscore is 4. GCS  "verbal subscore is 5. GCS motor subscore is 6.   Skin: Skin is warm.   Psychiatric: She has a normal mood and affect. Her behavior is normal. Judgment and thought content normal.         ED Course   Procedures  Labs Reviewed - No data to display       Imaging Results          X-Ray Chest PA And Lateral (Final result)  Result time 09/06/19 00:07:23    Final result by Hernandez Laboy MD (09/06/19 00:07:23)                 Impression:      No acute cardiopulmonary finding.      Electronically signed by: Hernandez Laboy MD  Date:    09/06/2019  Time:    00:07             Narrative:    EXAMINATION:  XR CHEST PA AND LATERAL    CLINICAL HISTORY:  Provided history is "  Cough".    TECHNIQUE:  Frontal and lateral views of the chest were performed.    COMPARISON:  06/09/2018.    FINDINGS:  Cardiac silhouette is not enlarged. No focal consolidation.  No sizable pleural effusion.  No pneumothorax.                              X-Rays:   Independently Interpreted Readings:   Chest X-Ray: Normal heart size.  No infiltrates.  No acute abnormalities.     Medical Decision Making:   Initial Assessment:     Urgent evaluation of a 43-year-old female with a past medical history of hypertension, diabetes, asthma, JOSUE on CPAP, presenting to the emergency department with complaints of URI like symptoms with a cough.  Patient is afebrile, nontoxic appearing, hemodynamically stable. Will plan for chest x-ray and reassess.    Clinical Tests:   Radiological Study: Ordered and Reviewed  ED Management:    Chest x-ray is unremarkable, no evidence of acute process.  Given patient's history and physical exam findings, signs symptoms are most consistent with a viral etiology.  I do not feel that any further testing imaging is warranted.  Patient reports some improvement of her symptoms after breathing treatment.  No signs symptoms concerning for acute bacterial infection that would require antibiotics.  She will be treated symptomatically with " Flonase and Tessalon Perles.  Counseled on home care and treatment.  Discharged in stable condition. The patient was instructed to follow up with a primary care provider in 2 days or to return to the emergency department for worsening symptoms. The treatment plan was discussed with the patient who demonstrated understanding and comfort with plan.    This note was created using [employer] Fluency Direct. There may be typographical errors secondary to dictation.                         Clinical Impression:     1. Viral URI with cough    2. Cough         Disposition:   Disposition: Discharged  Condition: Stable                        Gavi Nelson PA-C  09/06/19 0014

## 2019-09-06 NOTE — ED TRIAGE NOTES
"Pt arrived with c/o "sinus pressure" and sore throat "worse when lying down." x 3 days.  Denies fever, but states, "I get cold then I get hot."  Took a "sinus pill" at about 6pm today, doesn't remember the name.  States, "This happens to me every year and I need a steroid shot."  +stuffy nose.  "

## 2019-09-06 NOTE — ED NOTES
Pt rounding complete.  Pain 0/10, states she feels a little better since her breathing tx.  Restroom and comfort needs addressed.  Pt updated on plan of care.  Call light within reach.  Will continue to monitor.  Family at bedside.

## 2019-10-08 ENCOUNTER — HOSPITAL ENCOUNTER (EMERGENCY)
Facility: OTHER | Age: 44
Discharge: HOME | End: 2019-10-08
Attending: EMERGENCY MEDICINE
Payer: MEDICARE

## 2019-10-08 VITALS
OXYGEN SATURATION: 100 % | TEMPERATURE: 98 F | DIASTOLIC BLOOD PRESSURE: 100 MMHG | HEIGHT: 66 IN | HEART RATE: 57 BPM | BODY MASS INDEX: 33.59 KG/M2 | WEIGHT: 209 LBS | SYSTOLIC BLOOD PRESSURE: 142 MMHG | RESPIRATION RATE: 16 BRPM

## 2019-10-08 DIAGNOSIS — S16.1XXA STRAIN OF NECK MUSCLE, INITIAL ENCOUNTER: Primary | ICD-10-CM

## 2019-10-08 DIAGNOSIS — R07.89 RIGHT-SIDED CHEST WALL PAIN: ICD-10-CM

## 2019-10-08 DIAGNOSIS — I10 ESSENTIAL HYPERTENSION: ICD-10-CM

## 2019-10-08 LAB
ANION GAP SERPL CALC-SCNC: 10 MMOL/L (ref 8–16)
BASOPHILS # BLD AUTO: 0.02 K/UL (ref 0–0.2)
BASOPHILS NFR BLD: 0.4 % (ref 0–1.9)
BUN SERPL-MCNC: 5 MG/DL (ref 6–20)
CALCIUM SERPL-MCNC: 9 MG/DL (ref 8.7–10.5)
CHLORIDE SERPL-SCNC: 105 MMOL/L (ref 95–110)
CO2 SERPL-SCNC: 23 MMOL/L (ref 23–29)
CREAT SERPL-MCNC: 0.7 MG/DL (ref 0.5–1.4)
DIFFERENTIAL METHOD: ABNORMAL
EOSINOPHIL # BLD AUTO: 0.2 K/UL (ref 0–0.5)
EOSINOPHIL NFR BLD: 2.7 % (ref 0–8)
ERYTHROCYTE [DISTWIDTH] IN BLOOD BY AUTOMATED COUNT: 13.7 % (ref 11.5–14.5)
EST. GFR  (AFRICAN AMERICAN): >60 ML/MIN/1.73 M^2
EST. GFR  (NON AFRICAN AMERICAN): >60 ML/MIN/1.73 M^2
GLUCOSE SERPL-MCNC: 92 MG/DL (ref 70–110)
HCT VFR BLD AUTO: 35.8 % (ref 37–48.5)
HGB BLD-MCNC: 11.2 G/DL (ref 12–16)
IMM GRANULOCYTES # BLD AUTO: 0.01 K/UL (ref 0–0.04)
IMM GRANULOCYTES NFR BLD AUTO: 0.2 % (ref 0–0.5)
LYMPHOCYTES # BLD AUTO: 2.5 K/UL (ref 1–4.8)
LYMPHOCYTES NFR BLD: 44.6 % (ref 18–48)
MCH RBC QN AUTO: 26.5 PG (ref 27–31)
MCHC RBC AUTO-ENTMCNC: 31.3 G/DL (ref 32–36)
MCV RBC AUTO: 85 FL (ref 82–98)
MONOCYTES # BLD AUTO: 0.5 K/UL (ref 0.3–1)
MONOCYTES NFR BLD: 8.5 % (ref 4–15)
NEUTROPHILS # BLD AUTO: 2.4 K/UL (ref 1.8–7.7)
NEUTROPHILS NFR BLD: 43.6 % (ref 38–73)
NRBC BLD-RTO: 0 /100 WBC
PLATELET # BLD AUTO: 226 K/UL (ref 150–350)
PMV BLD AUTO: 12.4 FL (ref 9.2–12.9)
POTASSIUM SERPL-SCNC: 3.6 MMOL/L (ref 3.5–5.1)
RBC # BLD AUTO: 4.22 M/UL (ref 4–5.4)
SODIUM SERPL-SCNC: 138 MMOL/L (ref 136–145)
TROPONIN I SERPL DL<=0.01 NG/ML-MCNC: <0.006 NG/ML (ref 0–0.03)
WBC # BLD AUTO: 5.56 K/UL (ref 3.9–12.7)

## 2019-10-08 PROCEDURE — 80048 BASIC METABOLIC PNL TOTAL CA: CPT

## 2019-10-08 PROCEDURE — 96372 THER/PROPH/DIAG INJ SC/IM: CPT

## 2019-10-08 PROCEDURE — 25000003 PHARM REV CODE 250: Performed by: PHYSICIAN ASSISTANT

## 2019-10-08 PROCEDURE — 93010 EKG 12-LEAD: ICD-10-PCS | Mod: ,,, | Performed by: INTERNAL MEDICINE

## 2019-10-08 PROCEDURE — 93010 ELECTROCARDIOGRAM REPORT: CPT | Mod: ,,, | Performed by: INTERNAL MEDICINE

## 2019-10-08 PROCEDURE — 85025 COMPLETE CBC W/AUTO DIFF WBC: CPT

## 2019-10-08 PROCEDURE — 84484 ASSAY OF TROPONIN QUANT: CPT

## 2019-10-08 PROCEDURE — 63600175 PHARM REV CODE 636 W HCPCS: Performed by: PHYSICIAN ASSISTANT

## 2019-10-08 PROCEDURE — 93005 ELECTROCARDIOGRAM TRACING: CPT

## 2019-10-08 PROCEDURE — 99285 EMERGENCY DEPT VISIT HI MDM: CPT | Mod: 25

## 2019-10-08 RX ORDER — CYCLOBENZAPRINE HCL 10 MG
10 TABLET ORAL
Status: COMPLETED | OUTPATIENT
Start: 2019-10-08 | End: 2019-10-08

## 2019-10-08 RX ORDER — LIDOCAINE 50 MG/G
PATCH TOPICAL
Qty: 15 PATCH | Refills: 0 | OUTPATIENT
Start: 2019-10-08 | End: 2020-03-10

## 2019-10-08 RX ORDER — CYCLOBENZAPRINE HCL 10 MG
10 TABLET ORAL 3 TIMES DAILY PRN
Qty: 12 TABLET | Refills: 0 | Status: SHIPPED | OUTPATIENT
Start: 2019-10-08 | End: 2019-10-13

## 2019-10-08 RX ORDER — HYDROCODONE BITARTRATE AND ACETAMINOPHEN 5; 325 MG/1; MG/1
1 TABLET ORAL
Status: COMPLETED | OUTPATIENT
Start: 2019-10-08 | End: 2019-10-08

## 2019-10-08 RX ORDER — ACETAMINOPHEN 325 MG/1
650 TABLET ORAL
Status: COMPLETED | OUTPATIENT
Start: 2019-10-08 | End: 2019-10-08

## 2019-10-08 RX ORDER — KETOROLAC TROMETHAMINE 30 MG/ML
30 INJECTION, SOLUTION INTRAMUSCULAR; INTRAVENOUS
Status: COMPLETED | OUTPATIENT
Start: 2019-10-08 | End: 2019-10-08

## 2019-10-08 RX ORDER — IBUPROFEN 800 MG/1
800 TABLET ORAL EVERY 6 HOURS PRN
Qty: 25 TABLET | Refills: 0 | OUTPATIENT
Start: 2019-10-08 | End: 2020-03-10

## 2019-10-08 RX ADMIN — KETOROLAC TROMETHAMINE 30 MG: 30 INJECTION, SOLUTION INTRAMUSCULAR; INTRAVENOUS at 08:10

## 2019-10-08 RX ADMIN — ACETAMINOPHEN 650 MG: 325 TABLET, FILM COATED ORAL at 08:10

## 2019-10-08 RX ADMIN — CYCLOBENZAPRINE HYDROCHLORIDE 10 MG: 10 TABLET, FILM COATED ORAL at 08:10

## 2019-10-08 RX ADMIN — HYDROCODONE BITARTRATE AND ACETAMINOPHEN 1 TABLET: 5; 325 TABLET ORAL at 09:10

## 2019-10-09 NOTE — ED PROVIDER NOTES
Encounter Date: 10/8/2019       History     Chief Complaint   Patient presents with    Neck Pain     right sided neck pain that radiates down her right arm x2 weeks. Pain is exacerbated by turning neck to right side. Denies injury     Patient is a 43-year-old female presents to the emergency department with right upper back pain, neck pain, and right-sided chest wall pain for the past 2 weeks.  Patient denies injury or heavy lifting. She states pain is exacerbated with movement.  She has not taken any analgesics for her pain. She denies shortness of breath, nausea, emesis or diaphoresis.    The history is provided by the patient.     Review of patient's allergies indicates:  No Known Allergies  Past Medical History:   Diagnosis Date    Anxiety     Asthma     DDD (degenerative disc disease), cervical     Diabetes mellitus     Hypertension     JOSUE on CPAP      Past Surgical History:   Procedure Laterality Date    EPIDURAL STEROID INJECTION N/A 10/23/2018    Procedure: Injection, Steroid, Epidural LUMBAR L4/5 TESS;  Surgeon: Ed Pina MD;  [facility];  Service: Pain Management;  Laterality: N/A;    HYSTERECTOMY      laparoscopic    LAPAROSCOPIC SLEEVE GASTRECTOMY N/A 5/13/2019    Procedure: GASTRECTOMY, SLEEVE, LAPAROSCOPIC;  Surgeon: Jie Montanez MD;  Location: [facility] OR;  Service: General;  Laterality: N/A;    right knee surgery       Family History   Problem Relation Age of Onset    Diabetes Father      Social History     Tobacco Use    Smoking status: Never Smoker    Smokeless tobacco: Never Used   Substance Use Topics    Alcohol use: Yes     Comment: social    Drug use: No     Review of Systems   Constitutional: Negative for chills and fever.   HENT: Negative for congestion and sore throat.    Eyes: Negative for visual disturbance.   Respiratory: Negative for cough and shortness of breath.    Cardiovascular: Negative for chest pain.   Gastrointestinal: Negative for  abdominal pain, diarrhea, nausea and vomiting.   Genitourinary: Negative for dysuria.   Musculoskeletal: Positive for back pain and neck pain. Negative for arthralgias.   Skin: Negative for rash.   Neurological: Negative for headaches.       Physical Exam     Initial Vitals [10/08/19 1902]   BP Pulse Resp Temp SpO2   (!) 142/100 (!) 57 16 97.9 °F (36.6 °C) 100 %      MAP       --         Physical Exam    Vitals reviewed.  Constitutional: She is cooperative.   Patient in no distress at rest   Eyes: Conjunctivae and EOM are normal.   Neck: Normal range of motion. Neck supple.   Tenderness to the right lateral neck muscles.     Cardiovascular: Regular rhythm and intact distal pulses. Bradycardia present.    Pulmonary/Chest: Breath sounds normal. She has no wheezes. She has no rhonchi. She has no rales.       Abdominal: Soft. Bowel sounds are normal. There is no tenderness.   Musculoskeletal:   Diffuse tenderness to the right upper back.  No spinal midline tenderness.  Right shoulder has no obvious deformity or crepitus with with passive movement   Neurological: She is alert and oriented to person, place, and time. GCS eye subscore is 4. GCS verbal subscore is 5. GCS motor subscore is 6.   Skin: Skin is warm and dry. No rash noted.         ED Course   Procedures  Labs Reviewed   CBC W/ AUTO DIFFERENTIAL - Abnormal; Notable for the following components:       Result Value    Hemoglobin 11.2 (*)     Hematocrit 35.8 (*)     Mean Corpuscular Hemoglobin 26.5 (*)     Mean Corpuscular Hemoglobin Conc 31.3 (*)     All other components within normal limits   BASIC METABOLIC PANEL - Abnormal; Notable for the following components:    BUN, Bld 5 (*)     All other components within normal limits   TROPONIN I     EKG Readings: (Independently Interpreted)   Sinus bradycardia rate of 50 beats per minute. No STEMI.  No ischemic changes.       Imaging Results          X-Ray Chest PA And Lateral (Final result)  Result time 10/08/19  20:48:38    Final result by Esteban Hernandez MD (10/08/19 20:48:38)                 Impression:      No acute cardiopulmonary process identified.      Electronically signed by: Esteban Hernandez MD  Date:    10/08/2019  Time:    20:48             Narrative:    EXAMINATION:  XR CHEST PA AND LATERAL    CLINICAL HISTORY:  Other chest pain    TECHNIQUE:  PA and lateral views of the chest were performed.    COMPARISON:  09/05/2019.    FINDINGS:  Cardiac silhouette is normal in size.  Lungs are symmetrically expanded.  No evidence of focal consolidative process, pneumothorax, or significant effusion.  No acute osseous abnormality identified.                                 Medical Decision Making:   Initial Assessment:   Urgent evaluation of a 43 y.o. female presenting to the emergency department complaining of back pain, neck pain and chest wall pain x2 weeks.  No history of injury.. Patient is afebrile, nontoxic appearing and hemodynamically stable.  Pain is reproducible on exam with musculoskeletal manipulation.  EKG revealed no ischemic changes.  Will provide patient with analgesics, muscle relaxer and reassess.  ED Management:  After patient received Toradol, Tylenol and Flexeril, she states her pain is still a 10/10.  Cardiac workup was obtain due to patient's persistent pain.  Chest x-ray revealed no acute cardiopulmonary process.  Blood work revealed stable anemia.  Troponin was negative.  Upon reassessment after patient received Norco, she states her pain improved.  She will be sent home with medication for symptomatic care.  I do believe this is likely musculoskeletal in origin.  She was given strict return precautions.  Other:   I have discussed this case with another health care provider.                      Clinical Impression:     1. Strain of neck muscle, initial encounter    2. Right-sided chest wall pain    3. Essential hypertension                               Blaise Lorenzo PA-C  10/09/19 0027        Blaise Lorenzo PA-C  10/09/19 0027

## 2019-11-07 ENCOUNTER — LAB VISIT (OUTPATIENT)
Dept: LAB | Facility: OTHER | Age: 44
End: 2019-11-07
Attending: SURGERY
Payer: MEDICARE

## 2019-11-07 DIAGNOSIS — E55.9 VITAMIN D DEFICIENCY: ICD-10-CM

## 2019-11-07 DIAGNOSIS — E66.01 MORBID OBESITY: ICD-10-CM

## 2019-11-07 DIAGNOSIS — E53.8 B12 DEFICIENCY: ICD-10-CM

## 2019-11-07 DIAGNOSIS — E61.1 IRON DEFICIENCY: ICD-10-CM

## 2019-11-07 DIAGNOSIS — E11.42 TYPE 2 DIABETES MELLITUS WITH PERIPHERAL NEUROPATHY: ICD-10-CM

## 2019-11-07 LAB
25(OH)D3+25(OH)D2 SERPL-MCNC: 24 NG/ML (ref 30–96)
ALBUMIN SERPL BCP-MCNC: 3.5 G/DL (ref 3.5–5.2)
ALP SERPL-CCNC: 78 U/L (ref 55–135)
ALT SERPL W/O P-5'-P-CCNC: 11 U/L (ref 10–44)
ANION GAP SERPL CALC-SCNC: 10 MMOL/L (ref 8–16)
ANISOCYTOSIS BLD QL SMEAR: SLIGHT
AST SERPL-CCNC: 13 U/L (ref 10–40)
BASOPHILS # BLD AUTO: 0.02 K/UL (ref 0–0.2)
BASOPHILS NFR BLD: 0.4 % (ref 0–1.9)
BILIRUB SERPL-MCNC: 0.5 MG/DL (ref 0.1–1)
BUN SERPL-MCNC: 7 MG/DL (ref 6–20)
CALCIUM SERPL-MCNC: 8.9 MG/DL (ref 8.7–10.5)
CHLORIDE SERPL-SCNC: 102 MMOL/L (ref 95–110)
CHOLEST SERPL-MCNC: 163 MG/DL (ref 120–199)
CHOLEST/HDLC SERPL: 3 {RATIO} (ref 2–5)
CO2 SERPL-SCNC: 24 MMOL/L (ref 23–29)
CREAT SERPL-MCNC: 0.8 MG/DL (ref 0.5–1.4)
DIFFERENTIAL METHOD: ABNORMAL
EOSINOPHIL # BLD AUTO: 0.2 K/UL (ref 0–0.5)
EOSINOPHIL NFR BLD: 3 % (ref 0–8)
ERYTHROCYTE [DISTWIDTH] IN BLOOD BY AUTOMATED COUNT: 13 % (ref 11.5–14.5)
EST. GFR  (AFRICAN AMERICAN): >60 ML/MIN/1.73 M^2
EST. GFR  (NON AFRICAN AMERICAN): >60 ML/MIN/1.73 M^2
ESTIMATED AVG GLUCOSE: 137 MG/DL (ref 68–131)
GIANT PLATELETS BLD QL SMEAR: PRESENT
GLUCOSE SERPL-MCNC: 178 MG/DL (ref 70–110)
HBA1C MFR BLD HPLC: 6.4 % (ref 4–5.6)
HCT VFR BLD AUTO: 37.5 % (ref 37–48.5)
HDLC SERPL-MCNC: 55 MG/DL (ref 40–75)
HDLC SERPL: 33.7 % (ref 20–50)
HGB BLD-MCNC: 11.5 G/DL (ref 12–16)
HYPOCHROMIA BLD QL SMEAR: ABNORMAL
IMM GRANULOCYTES # BLD AUTO: 0.01 K/UL (ref 0–0.04)
IMM GRANULOCYTES NFR BLD AUTO: 0.2 % (ref 0–0.5)
IRON SERPL-MCNC: 95 UG/DL (ref 30–160)
LDLC SERPL CALC-MCNC: 96.6 MG/DL (ref 63–159)
LYMPHOCYTES # BLD AUTO: 1.9 K/UL (ref 1–4.8)
LYMPHOCYTES NFR BLD: 36 % (ref 18–48)
MCH RBC QN AUTO: 26.3 PG (ref 27–31)
MCHC RBC AUTO-ENTMCNC: 30.7 G/DL (ref 32–36)
MCV RBC AUTO: 86 FL (ref 82–98)
MONOCYTES # BLD AUTO: 0.4 K/UL (ref 0.3–1)
MONOCYTES NFR BLD: 7.1 % (ref 4–15)
NEUTROPHILS # BLD AUTO: 2.9 K/UL (ref 1.8–7.7)
NEUTROPHILS NFR BLD: 53.3 % (ref 38–73)
NONHDLC SERPL-MCNC: 108 MG/DL
NRBC BLD-RTO: 0 /100 WBC
PLATELET # BLD AUTO: 240 K/UL (ref 150–350)
PLATELET BLD QL SMEAR: ABNORMAL
PMV BLD AUTO: 12.7 FL (ref 9.2–12.9)
POTASSIUM SERPL-SCNC: 3.6 MMOL/L (ref 3.5–5.1)
PROT SERPL-MCNC: 6.6 G/DL (ref 6–8.4)
RBC # BLD AUTO: 4.38 M/UL (ref 4–5.4)
SATURATED IRON: 26 % (ref 20–50)
SODIUM SERPL-SCNC: 136 MMOL/L (ref 136–145)
TOTAL IRON BINDING CAPACITY: 369 UG/DL (ref 250–450)
TRANSFERRIN SERPL-MCNC: 249 MG/DL (ref 200–375)
TRIGL SERPL-MCNC: 57 MG/DL (ref 30–150)
VIT B12 SERPL-MCNC: 407 PG/ML (ref 210–950)
WBC # BLD AUTO: 5.36 K/UL (ref 3.9–12.7)

## 2019-11-07 PROCEDURE — 36415 COLL VENOUS BLD VENIPUNCTURE: CPT

## 2019-11-07 PROCEDURE — 84425 ASSAY OF VITAMIN B-1: CPT

## 2019-11-07 PROCEDURE — 80061 LIPID PANEL: CPT

## 2019-11-07 PROCEDURE — 82306 VITAMIN D 25 HYDROXY: CPT

## 2019-11-07 PROCEDURE — 82607 VITAMIN B-12: CPT

## 2019-11-07 PROCEDURE — 83036 HEMOGLOBIN GLYCOSYLATED A1C: CPT

## 2019-11-07 PROCEDURE — 80053 COMPREHEN METABOLIC PANEL: CPT

## 2019-11-07 PROCEDURE — 85025 COMPLETE CBC W/AUTO DIFF WBC: CPT

## 2019-11-07 PROCEDURE — 83540 ASSAY OF IRON: CPT

## 2019-11-11 LAB — VIT B1 BLD-MCNC: 30 UG/L (ref 38–122)

## 2019-11-13 ENCOUNTER — OFFICE VISIT (OUTPATIENT)
Dept: BARIATRICS | Facility: CLINIC | Age: 44
End: 2019-11-13
Payer: MEDICARE

## 2019-11-13 VITALS
HEART RATE: 67 BPM | HEIGHT: 66 IN | BODY MASS INDEX: 31.63 KG/M2 | WEIGHT: 196.81 LBS | RESPIRATION RATE: 16 BRPM | SYSTOLIC BLOOD PRESSURE: 124 MMHG | DIASTOLIC BLOOD PRESSURE: 63 MMHG | TEMPERATURE: 98 F

## 2019-11-13 DIAGNOSIS — E61.1 IRON DEFICIENCY: ICD-10-CM

## 2019-11-13 DIAGNOSIS — E66.01 MORBID OBESITY: ICD-10-CM

## 2019-11-13 DIAGNOSIS — G47.33 OSA ON CPAP: ICD-10-CM

## 2019-11-13 DIAGNOSIS — E66.2 MORBID (SEVERE) OBESITY WITH ALVEOLAR HYPOVENTILATION: Primary | ICD-10-CM

## 2019-11-13 DIAGNOSIS — E55.9 VITAMIN D DEFICIENCY: ICD-10-CM

## 2019-11-13 DIAGNOSIS — E11.42 TYPE 2 DIABETES MELLITUS WITH PERIPHERAL NEUROPATHY: ICD-10-CM

## 2019-11-13 DIAGNOSIS — E53.8 B12 DEFICIENCY: ICD-10-CM

## 2019-11-13 PROCEDURE — 3078F DIAST BP <80 MM HG: CPT | Mod: CPTII,S$GLB,, | Performed by: SURGERY

## 2019-11-13 PROCEDURE — 3008F PR BODY MASS INDEX (BMI) DOCUMENTED: ICD-10-PCS | Mod: CPTII,S$GLB,, | Performed by: SURGERY

## 2019-11-13 PROCEDURE — 99999 PR PBB SHADOW E&M-EST. PATIENT-LVL IV: ICD-10-PCS | Mod: PBBFAC,,, | Performed by: SURGERY

## 2019-11-13 PROCEDURE — 3078F PR MOST RECENT DIASTOLIC BLOOD PRESSURE < 80 MM HG: ICD-10-PCS | Mod: CPTII,S$GLB,, | Performed by: SURGERY

## 2019-11-13 PROCEDURE — 99999 PR PBB SHADOW E&M-EST. PATIENT-LVL IV: CPT | Mod: PBBFAC,,, | Performed by: SURGERY

## 2019-11-13 PROCEDURE — 3044F PR MOST RECENT HEMOGLOBIN A1C LEVEL <7.0%: ICD-10-PCS | Mod: CPTII,S$GLB,, | Performed by: SURGERY

## 2019-11-13 PROCEDURE — 99213 PR OFFICE/OUTPT VISIT, EST, LEVL III, 20-29 MIN: ICD-10-PCS | Mod: S$GLB,,, | Performed by: SURGERY

## 2019-11-13 PROCEDURE — 99213 OFFICE O/P EST LOW 20 MIN: CPT | Mod: S$GLB,,, | Performed by: SURGERY

## 2019-11-13 PROCEDURE — 3074F PR MOST RECENT SYSTOLIC BLOOD PRESSURE < 130 MM HG: ICD-10-PCS | Mod: CPTII,S$GLB,, | Performed by: SURGERY

## 2019-11-13 PROCEDURE — 3008F BODY MASS INDEX DOCD: CPT | Mod: CPTII,S$GLB,, | Performed by: SURGERY

## 2019-11-13 PROCEDURE — 3044F HG A1C LEVEL LT 7.0%: CPT | Mod: CPTII,S$GLB,, | Performed by: SURGERY

## 2019-11-13 PROCEDURE — 3074F SYST BP LT 130 MM HG: CPT | Mod: CPTII,S$GLB,, | Performed by: SURGERY

## 2019-11-13 RX ORDER — SUCRALFATE 1 G/1
1 TABLET ORAL 2 TIMES DAILY
Qty: 28 TABLET | Refills: 0 | Status: SHIPPED | OUTPATIENT
Start: 2019-11-13 | End: 2019-11-27

## 2019-11-13 RX ORDER — FERROUS SULFATE 324(65)MG
TABLET, DELAYED RELEASE (ENTERIC COATED) ORAL
COMMUNITY
Start: 2016-09-21 | End: 2020-03-10

## 2019-11-13 NOTE — PROGRESS NOTES
Post Op Note    Surgery: gastric sleeve surgery  Date: 5/13/19  Initial weight: 240  Last weight: 209  Current weight: 196    Constipation: none  Reflux: none  Vomiting: none    Some pain in epigastrium at night    Diet:  Breakfast:  Protein shake  Lunch: skips  Dinner: broccoli and another shake  Snack: none  Protein shake: premier     Exercise:  Walking still  Does cleaning at school    MVI: 2 mvi, calcium, b12    Vitals:    11/13/19 0925   BP: 124/63   Pulse: 67   Resp: 16   Temp: 98.2 °F (36.8 °C)       Body comp:  Fat Percent:  43.6 %  Fat Mass:  85.8 lb  FFM:  111 lb  TBW: 79.8 lb  TBW %:  40.5 %  BMR: 1560 kcal    PE:  NAD  RRR  Soft/nt/nd  Incisions: well healed    Labs: reviewed    A/P: s/p sleeve     Counseling for patient:    Diet: continue low carb dieting  Exercise: start increasing intensity over time  Vitamins: 2 mvi, calcium, b12    Co morbidities:     1. Morbid (severe) obesity with alveolar hypoventilation     2. Morbid obesity     3. Type 2 diabetes mellitus with peripheral neuropathy     4. JOSUE on CPAP         -All above: Evaluated, monitored, and treated with diet and exercise program.        : I met with the patient for 15 minutes and counseled her for over 50% of that time

## 2020-02-06 ENCOUNTER — LAB VISIT (OUTPATIENT)
Dept: LAB | Facility: OTHER | Age: 45
End: 2020-02-06
Attending: SURGERY
Payer: MEDICARE

## 2020-02-06 DIAGNOSIS — E55.9 VITAMIN D DEFICIENCY: ICD-10-CM

## 2020-02-06 DIAGNOSIS — E66.2 MORBID (SEVERE) OBESITY WITH ALVEOLAR HYPOVENTILATION: ICD-10-CM

## 2020-02-06 DIAGNOSIS — E61.1 IRON DEFICIENCY: ICD-10-CM

## 2020-02-06 LAB
25(OH)D3+25(OH)D2 SERPL-MCNC: 15 NG/ML (ref 30–96)
ALBUMIN SERPL BCP-MCNC: 3.7 G/DL (ref 3.5–5.2)
ALP SERPL-CCNC: 74 U/L (ref 55–135)
ALT SERPL W/O P-5'-P-CCNC: 10 U/L (ref 10–44)
ANION GAP SERPL CALC-SCNC: 11 MMOL/L (ref 8–16)
AST SERPL-CCNC: 12 U/L (ref 10–40)
BASOPHILS # BLD AUTO: 0.02 K/UL (ref 0–0.2)
BASOPHILS NFR BLD: 0.4 % (ref 0–1.9)
BILIRUB SERPL-MCNC: 0.6 MG/DL (ref 0.1–1)
BUN SERPL-MCNC: 9 MG/DL (ref 6–20)
CALCIUM SERPL-MCNC: 9 MG/DL (ref 8.7–10.5)
CHLORIDE SERPL-SCNC: 103 MMOL/L (ref 95–110)
CO2 SERPL-SCNC: 24 MMOL/L (ref 23–29)
CREAT SERPL-MCNC: 0.8 MG/DL (ref 0.5–1.4)
DIFFERENTIAL METHOD: ABNORMAL
EOSINOPHIL # BLD AUTO: 0.3 K/UL (ref 0–0.5)
EOSINOPHIL NFR BLD: 4.7 % (ref 0–8)
ERYTHROCYTE [DISTWIDTH] IN BLOOD BY AUTOMATED COUNT: 13.1 % (ref 11.5–14.5)
EST. GFR  (AFRICAN AMERICAN): >60 ML/MIN/1.73 M^2
EST. GFR  (NON AFRICAN AMERICAN): >60 ML/MIN/1.73 M^2
GLUCOSE SERPL-MCNC: 120 MG/DL (ref 70–110)
HCT VFR BLD AUTO: 37.3 % (ref 37–48.5)
HGB BLD-MCNC: 11.5 G/DL (ref 12–16)
IMM GRANULOCYTES # BLD AUTO: 0 K/UL (ref 0–0.04)
IMM GRANULOCYTES NFR BLD AUTO: 0 % (ref 0–0.5)
IRON SERPL-MCNC: 95 UG/DL (ref 30–160)
LYMPHOCYTES # BLD AUTO: 2.1 K/UL (ref 1–4.8)
LYMPHOCYTES NFR BLD: 39.1 % (ref 18–48)
MCH RBC QN AUTO: 26.6 PG (ref 27–31)
MCHC RBC AUTO-ENTMCNC: 30.8 G/DL (ref 32–36)
MCV RBC AUTO: 86 FL (ref 82–98)
MONOCYTES # BLD AUTO: 0.5 K/UL (ref 0.3–1)
MONOCYTES NFR BLD: 9.3 % (ref 4–15)
NEUTROPHILS # BLD AUTO: 2.5 K/UL (ref 1.8–7.7)
NEUTROPHILS NFR BLD: 46.5 % (ref 38–73)
NRBC BLD-RTO: 0 /100 WBC
PLATELET # BLD AUTO: 245 K/UL (ref 150–350)
PMV BLD AUTO: 12.7 FL (ref 9.2–12.9)
POTASSIUM SERPL-SCNC: 3.8 MMOL/L (ref 3.5–5.1)
PROT SERPL-MCNC: 6.9 G/DL (ref 6–8.4)
RBC # BLD AUTO: 4.32 M/UL (ref 4–5.4)
SATURATED IRON: 24 % (ref 20–50)
SODIUM SERPL-SCNC: 138 MMOL/L (ref 136–145)
TOTAL IRON BINDING CAPACITY: 401 UG/DL (ref 250–450)
TRANSFERRIN SERPL-MCNC: 271 MG/DL (ref 200–375)
WBC # BLD AUTO: 5.27 K/UL (ref 3.9–12.7)

## 2020-02-06 PROCEDURE — 83540 ASSAY OF IRON: CPT

## 2020-02-06 PROCEDURE — 84425 ASSAY OF VITAMIN B-1: CPT

## 2020-02-06 PROCEDURE — 80053 COMPREHEN METABOLIC PANEL: CPT

## 2020-02-06 PROCEDURE — 36415 COLL VENOUS BLD VENIPUNCTURE: CPT

## 2020-02-06 PROCEDURE — 85025 COMPLETE CBC W/AUTO DIFF WBC: CPT

## 2020-02-06 PROCEDURE — 82306 VITAMIN D 25 HYDROXY: CPT

## 2020-02-08 LAB — VIT B1 BLD-MCNC: 35 UG/L (ref 38–122)

## 2020-02-27 RX ORDER — OMEPRAZOLE 20 MG/1
20 CAPSULE, DELAYED RELEASE ORAL DAILY
Qty: 30 CAPSULE | Refills: 3 | Status: SHIPPED | OUTPATIENT
Start: 2020-02-27 | End: 2020-05-27 | Stop reason: SDUPTHER

## 2020-03-10 ENCOUNTER — HOSPITAL ENCOUNTER (EMERGENCY)
Facility: OTHER | Age: 45
Discharge: HOME | End: 2020-03-10
Attending: EMERGENCY MEDICINE
Payer: MEDICARE

## 2020-03-10 VITALS
SYSTOLIC BLOOD PRESSURE: 124 MMHG | WEIGHT: 199 LBS | BODY MASS INDEX: 31.98 KG/M2 | TEMPERATURE: 99 F | HEART RATE: 66 BPM | OXYGEN SATURATION: 99 % | RESPIRATION RATE: 18 BRPM | DIASTOLIC BLOOD PRESSURE: 80 MMHG | HEIGHT: 66 IN

## 2020-03-10 DIAGNOSIS — M25.569 KNEE PAIN, ACUTE: ICD-10-CM

## 2020-03-10 DIAGNOSIS — R09.81 SINUS CONGESTION: Primary | ICD-10-CM

## 2020-03-10 PROCEDURE — 96372 THER/PROPH/DIAG INJ SC/IM: CPT

## 2020-03-10 PROCEDURE — 63600175 PHARM REV CODE 636 W HCPCS: Performed by: EMERGENCY MEDICINE

## 2020-03-10 PROCEDURE — 25000003 PHARM REV CODE 250: Performed by: EMERGENCY MEDICINE

## 2020-03-10 PROCEDURE — 99284 EMERGENCY DEPT VISIT MOD MDM: CPT | Mod: 25

## 2020-03-10 RX ORDER — IBUPROFEN 600 MG/1
600 TABLET ORAL EVERY 8 HOURS PRN
Qty: 30 TABLET | Refills: 0 | Status: SHIPPED | OUTPATIENT
Start: 2020-03-10 | End: 2020-08-07

## 2020-03-10 RX ORDER — KETOROLAC TROMETHAMINE 30 MG/ML
15 INJECTION, SOLUTION INTRAMUSCULAR; INTRAVENOUS
Status: COMPLETED | OUTPATIENT
Start: 2020-03-10 | End: 2020-03-10

## 2020-03-10 RX ORDER — OXYMETAZOLINE HCL 0.05 %
1 SPRAY, NON-AEROSOL (ML) NASAL 2 TIMES DAILY PRN
Qty: 15 ML | Refills: 0 | Status: SHIPPED | OUTPATIENT
Start: 2020-03-10 | End: 2020-03-13

## 2020-03-10 RX ORDER — OXYMETAZOLINE HCL 0.05 %
1 SPRAY, NON-AEROSOL (ML) NASAL
Status: COMPLETED | OUTPATIENT
Start: 2020-03-10 | End: 2020-03-10

## 2020-03-10 RX ADMIN — Medication 1 SPRAY: at 08:03

## 2020-03-10 RX ADMIN — KETOROLAC TROMETHAMINE 15 MG: 30 INJECTION, SOLUTION INTRAMUSCULAR; INTRAVENOUS at 08:03

## 2020-03-10 NOTE — ED PROVIDER NOTES
"Encounter Date: 3/10/2020    SCRIBE #1 NOTE: I, Sandra Crawford, am scribing for, and in the presence of, Dr. Pagan.       History     Chief Complaint   Patient presents with    Generalized Body Aches     Pt c/o generalized aches, intermittent rhinorrhea & nasal congestion X 3 days. Pt states "I think I have a sinus infection."    Knee Pain     Pt c/o knee pain & swelling, reports previous knee surgery, states "they had to scape some stuff from under my knee cap, but I guess it's coming back." Pt c/o worsening symptoms last week.     Time seen by provider: 8:11 AM    This is a 44 y.o. female with history of HTN, DM, and asthma who presents with complaint of generalized body aches that began 3 days ago. Patient reports rhinorrhea, nasal congestion and headache, but denies fever, chills, nausea, vomiting, cough, or SOB. She reports sick contacts, stating she works at [employer] as a . She denies any relief with use of tylenol, but states she has not taken any sinus medications. Additionally, pt c/o right knee pain with associated swelling that began over 1 week ago. She reports previously undergoing arthroscopy of right knee at [facility] and states pain is similar to when she had the surgery done. Denies any numbness, tingling or weakness.    The history is provided by the patient.     Review of patient's allergies indicates:  No Known Allergies  Past Medical History:   Diagnosis Date    Anxiety     Asthma     DDD (degenerative disc disease), cervical     Diabetes mellitus     Hypertension     JOSUE on CPAP      Past Surgical History:   Procedure Laterality Date    EPIDURAL STEROID INJECTION N/A 10/23/2018    Procedure: Injection, Steroid, Epidural LUMBAR L4/5 TESS;  Surgeon: Ed Pina MD;  Location: [facility];  Service: Pain Management;  Laterality: N/A;    HYSTERECTOMY      laparoscopic    LAPAROSCOPIC SLEEVE GASTRECTOMY N/A 5/13/2019    Procedure: GASTRECTOMY, SLEEVE, " LAPAROSCOPIC;  Surgeon: Jie Montanez MD;  Location: [facility];  Service: General;  Laterality: N/A;    right knee surgery       Family History   Problem Relation Age of Onset    Diabetes Father      Social History     Tobacco Use    Smoking status: Never Smoker    Smokeless tobacco: Never Used   Substance Use Topics    Alcohol use: Yes     Comment: social    Drug use: No     Review of Systems   Constitutional: Negative for chills and fever.   HENT: Positive for congestion and rhinorrhea. Negative for sore throat and trouble swallowing.    Eyes: Negative for photophobia and visual disturbance.   Respiratory: Negative for cough and shortness of breath.    Cardiovascular: Negative for chest pain.   Gastrointestinal: Negative for abdominal pain, nausea and vomiting.   Genitourinary: Negative for dysuria and hematuria.   Musculoskeletal: Positive for arthralgias (right knee), joint swelling (right knee) and myalgias. Negative for back pain and neck pain.   Skin: Negative for rash and wound.   Neurological: Positive for headaches. Negative for weakness and numbness.   Psychiatric/Behavioral: Negative.        Physical Exam     Initial Vitals [03/10/20 0751]   BP Pulse Resp Temp SpO2   126/83 61 18 98.6 °F (37 °C) 99 %      MAP       --         Physical Exam    Nursing note and vitals reviewed.  Constitutional: She appears well-developed and well-nourished. No distress.   HENT:   Head: Normocephalic and atraumatic.   No sinus tenderness.   Eyes: Conjunctivae and EOM are normal. Pupils are equal, round, and reactive to light.   Neck: Normal range of motion. Neck supple.   Cardiovascular: Normal rate, regular rhythm and normal heart sounds. Exam reveals no gallop and no friction rub.    No murmur heard.  Pulmonary/Chest: Breath sounds normal. No respiratory distress. She has no wheezes. She has no rhonchi. She has no rales.   Abdominal: Soft. There is no tenderness. There is no rebound and no guarding.    Musculoskeletal: Normal range of motion. She exhibits no edema or tenderness.   Mild diffuse right knee tenderness with no joint effusion.   Neurological: She is alert and oriented to person, place, and time. She has normal strength.   Skin: Skin is warm and dry. No rash noted.   Psychiatric: She has a normal mood and affect. Her behavior is normal. Judgment and thought content normal.         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      Electronically signed by: Kaleb Dela Cruz Jr  Date:    03/10/2020  Time:    08:43             Narrative:    EXAMINATION:  XR KNEE 3 VIEW RIGHT    CLINICAL HISTORY:  Pain in unspecified knee    TECHNIQUE:  XR KNEE 3 VIEW RIGHT    COMPARISON:  None    FINDINGS:  There is some asymmetric joint space narrowing of the lateral patellofemoral compartment seen on the sunrise view only.  There is no evidence of joint effusion.  The femorotibial joint spaces are preserved.                                 Medical Decision Making:   History:   Old Medical Records: I decided to obtain old medical records.  Initial Assessment:       44-year-old female with history of HTN, DM, asthma presents for evaluation of multiple complaints:  1) generalized body aches, sinus congestion and rhinorrhea for the past 3 days-she endorses associated mild frontal headache and states the symptoms are similar to previous sinus infections.  No associated cough, fever, SOB, vomiting or diarrhea.  On exam she is afebrile and well-appearing with no sinus tenderness or other concerning exam findings.  She does report sick contacts as she works in a hospital as a .  Given length of symptoms, lack of sinus tenderness or fever,  unlikely to be bacterial sinusitis and no indication for antibiotics at this point.  Symptoms also not consistent with influenza syndrome we've seen this year.  Corona virus considered a symptoms do not match reported typical presentation and patient does not meet current CDC testing criteria.  Patient has only taking Tylenol, no decongestants, so will start taking Zyrtec and Afrin and supportive care for likely viral sinusitis or URI.  She is advised to return to ED for any worsening symptoms or other concerns.    2) right knee pain for the past week with associated swelling- no known trauma or injury, the patient reports history of similar previous symptoms prior to knee surgery years ago.  Exam with mild diffuse tenderness and soft tissue swelling, no sign of large joint effusion or septic arthritis, no major ligament instability.  Per chart review, she did have arthroscopy with debridement of chondromalacia about 3 years ago.  Knee x-ray done and does show findings suggestive of patellofemoral chondromalacia.  Patient advised to continue supportive care and follow-up with her orthopedic surgeon for further management.      Clinical Tests:   Radiological Study: Ordered and Reviewed            Scribe Attestation:   Scribe #1: I performed the above scribed service and the documentation accurately describes the services I performed. I attest to the accuracy of the note.    Attending Attestation:           Physician Attestation for Scribe:  Physician Attestation Statement for Scribe #1: I, Dr. Pagan, reviewed documentation, as scribed by Sandra Crawford in my presence, and it is both accurate and complete.                               Clinical Impression:     1. Sinus congestion    2. Knee pain, acute              ED Disposition Condition    Discharge Stable        ED Prescriptions     Medication Sig Dispense Start Date End Date Auth. Provider    ibuprofen (ADVIL,MOTRIN) 600 MG tablet Take 1 tablet (600 mg total)  by mouth every 8 (eight) hours as needed for Pain. 30 tablet 3/10/2020  Glen Pagan MD    oxymetazoline (AFRIN) 0.05 % nasal spray (Expires today) 1 spray by Nasal route 2 (two) times daily as needed for Congestion. 15 mL 3/10/2020 3/13/2020 Glen Pagan MD        Follow-up Information     Follow up With Specialties Details Why Contact Info    [facility]  Schedule an appointment as soon as possible for a visit in 1 week                                       Glen Pagan MD  03/13/20 4541

## 2020-03-10 NOTE — ED NOTES
Patient Identifiers for Juan Carlos Turner checked and correct  Pt reports sinus congestion, denies fever/chills, and reports right knee pain from previous injury  LOC: The patient is awake, alert and aware of environment with an appropriate affect, the patient is oriented x 3 and speaking appropriate.  APPEARANCE: Patient resting comfortably and in no acute distress, patient is clean and well groomed, patient's clothing is properly fastened.  SKIN: The skin is warm and dry, patient has normal skin turgor and moist mucus membranes,no rashes or lesions.Skin Intact , No Breakdown Noted  Musculoskeletal :  limited range of motion noted due to pain in right knee, no swelling noted Moves all extremeties well, No swelling or tenderness noted  RESPIRATORY: Airway is open and patent, respirations are spontaneous, patient has a normal effort and rate.Bilateral Lungs Sounds are =  CARDIAC: Patient has a normal rate and rhythm, no periphreal edema noted, capillary refill < 3 seconds.   ABDOMEN: Soft and non tender to palpation, no distention noted.   PULSES: 2+  And symmetrical in all extremeties  NEUROLOGIC: PERRL, The patient is awake, alert and cooperative with a calm affect, patient is aware of environment.    Will continue to monitor

## 2020-03-11 ENCOUNTER — TELEPHONE (OUTPATIENT)
Dept: ORTHOPEDICS | Facility: CLINIC | Age: 45
End: 2020-03-11

## 2020-03-11 NOTE — TELEPHONE ENCOUNTER
Called and spoke with pt in regards to pt's appt with Tommy today. Explain to pt that  Tommy suggest pt be seen with the  who did her surgery in 2017. Pt verbally understood and was satisfied with decision.

## 2020-03-24 ENCOUNTER — HOSPITAL ENCOUNTER (EMERGENCY)
Facility: OTHER | Age: 45
Discharge: HOME | End: 2020-03-24
Attending: EMERGENCY MEDICINE
Payer: MEDICARE

## 2020-03-24 ENCOUNTER — TELEPHONE (OUTPATIENT)
Dept: SLEEP MEDICINE | Facility: CLINIC | Age: 45
End: 2020-03-24

## 2020-03-24 VITALS
RESPIRATION RATE: 19 BRPM | OXYGEN SATURATION: 100 % | WEIGHT: 199 LBS | DIASTOLIC BLOOD PRESSURE: 87 MMHG | SYSTOLIC BLOOD PRESSURE: 134 MMHG | HEIGHT: 66 IN | BODY MASS INDEX: 31.98 KG/M2 | TEMPERATURE: 99 F | HEART RATE: 68 BPM

## 2020-03-24 DIAGNOSIS — G47.33 OSA (OBSTRUCTIVE SLEEP APNEA): Primary | ICD-10-CM

## 2020-03-24 DIAGNOSIS — R06.02 SOB (SHORTNESS OF BREATH): ICD-10-CM

## 2020-03-24 DIAGNOSIS — R52 GENERALIZED BODY ACHES: ICD-10-CM

## 2020-03-24 DIAGNOSIS — J06.9 URI WITH COUGH AND CONGESTION: Primary | ICD-10-CM

## 2020-03-24 PROCEDURE — 99283 EMERGENCY DEPT VISIT LOW MDM: CPT | Mod: 25

## 2020-03-24 RX ORDER — FLUTICASONE PROPIONATE 50 MCG
1 SPRAY, SUSPENSION (ML) NASAL 2 TIMES DAILY PRN
Qty: 15 G | Refills: 0 | Status: SHIPPED | OUTPATIENT
Start: 2020-03-24

## 2020-03-24 RX ORDER — CETIRIZINE HYDROCHLORIDE 10 MG/1
10 TABLET ORAL DAILY
Qty: 30 TABLET | Refills: 0 | COMMUNITY
Start: 2020-03-24 | End: 2022-01-20

## 2020-03-24 NOTE — TELEPHONE ENCOUNTER
----- Message from Courtney Mckeon sent at 3/24/2020 11:55 AM CDT -----  Contact: CONNOR PERKINS  Name of Who is Calling: CONNOR PERKINS MAY      What is the request in detail: Would like to speak to staff in regards to needing more supplies for her C-PAP machine. Please advise.       Can the clinic reply by [facility]: No      What Number to Call Back if not in [facility]: [phone]

## 2020-03-25 ENCOUNTER — TELEPHONE (OUTPATIENT)
Dept: SLEEP MEDICINE | Facility: CLINIC | Age: 45
End: 2020-03-25

## 2020-03-25 NOTE — ED PROVIDER NOTES
Encounter Date: 3/24/2020       History     Chief Complaint   Patient presents with    URI     sinus pressure, sore throat, body aches, and headache x 2 weeks.      43 y/o female which presents with body aches, sinus congestion, sore throat and headaches for 2 weeks. Pt states she has taken multiple medications without relief. She denies fever, chest pain or shortness of breath.     The history is provided by the patient.     Review of patient's allergies indicates:  No Known Allergies  Past Medical History:   Diagnosis Date    Anxiety     Asthma     DDD (degenerative disc disease), cervical     Diabetes mellitus     Hypertension     JOSUE on CPAP      Past Surgical History:   Procedure Laterality Date    EPIDURAL STEROID INJECTION N/A 10/23/2018    Procedure: Injection, Steroid, Epidural LUMBAR L4/5 TESS;  Surgeon: Ed Pina MD;  Location: [facility];  Service: Pain Management;  Laterality: N/A;    HYSTERECTOMY      laparoscopic    LAPAROSCOPIC SLEEVE GASTRECTOMY N/A 5/13/2019    Procedure: GASTRECTOMY, SLEEVE, LAPAROSCOPIC;  Surgeon: Jie Montanez MD;  Location: [facility];  Service: General;  Laterality: N/A;    right knee surgery       Family History   Problem Relation Age of Onset    Diabetes Father      Social History     Tobacco Use    Smoking status: Never Smoker    Smokeless tobacco: Never Used   Substance Use Topics    Alcohol use: Yes     Comment: social    Drug use: No     Review of Systems   Constitutional: Negative for fever.   HENT: Positive for congestion and sore throat.    Respiratory: Negative for shortness of breath.    Cardiovascular: Negative for chest pain.   Gastrointestinal: Negative for nausea.   Genitourinary: Negative for dysuria.   Musculoskeletal: Positive for myalgias (generalized). Negative for back pain.   Skin: Negative for rash.   Neurological: Positive for headaches. Negative for dizziness and weakness.   Hematological: Does not bruise/bleed easily.    All other systems reviewed and are negative.    Physical Exam     Initial Vitals [03/24/20 1838]   BP Pulse Resp Temp SpO2   128/83 72 -- 98.5 °F (36.9 °C) 98 %      MAP       --         Physical Exam    Nursing note and vitals reviewed.  Constitutional: She appears well-developed and well-nourished.   HENT:   Head: Normocephalic and atraumatic.   Swollen turbinates noted to bilateral nares and cobblestone appearance to posterior oropharynx   Eyes: Conjunctivae and EOM are normal. Pupils are equal, round, and reactive to light.   Cardiovascular: Normal rate, regular rhythm, normal heart sounds and intact distal pulses. Exam reveals no gallop and no friction rub.    No murmur heard.  Pulmonary/Chest: Breath sounds normal. No respiratory distress. She has no wheezes. She has no rhonchi. She has no rales. She exhibits no tenderness.   Musculoskeletal: Normal range of motion. She exhibits no edema or tenderness.   Neurological: She is alert and oriented to person, place, and time. She has normal strength. GCS score is 15. GCS eye subscore is 4. GCS verbal subscore is 5. GCS motor subscore is 6.       ED Course   Procedures  Labs Reviewed - No data to display       Imaging Results          X-Ray Chest AP Portable (Final result)  Result time 03/24/20 19:28:35    Final result by Isaías Jay MD (03/24/20 19:28:35)                 Impression:      No detrimental change or radiographic acute intrathoracic process seen on this single view.  Specifically, no focal consolidation.      Electronically signed by: Isaías Jay MD  Date:    03/24/2020  Time:    19:28             Narrative:    EXAMINATION:  XR CHEST AP PORTABLE    CLINICAL HISTORY:  Shortness of breath    TECHNIQUE:  Single frontal view of the chest was performed.    COMPARISON:  Chest radiograph 10/08/2019 and CT thorax 08/07/2018    FINDINGS:  No detrimental change.The lungs are clear, with normal appearance of pulmonary vasculature and no pleural effusion or  pneumothorax.    The cardiac silhouette is normal in size. The hilar and mediastinal contours are unremarkable.    Bones are intact.                                 Medical Decision Making:   Initial Assessment:   43 y/o female which presents with body aches, sinus congestion, sore throat and headaches for 2 weeks. Pt states she has taken multiple medications without relief. She denies fever, chest pain or shortness of breath.   Differential Diagnosis:   Viral URI with cough, influenza, COVID-19, pneumonia    ED Management:  Patient seen for a viral-like illness, patient had a negative flu, due to the most recent recommendations from our hospital administrations/infectious disease at this time, the patient does not meet criteria for COVID 19 testing. Discussed with the patient the need to self quarantine at home given symptoms and possibility of this viral infection. Reinforced this advice and the dangers failing to comply presents to the public. Symptomatic treatment in the interim. Work note for two weeks was provided. Return precautions discussed. Vital signs did not indicate sepsis and patient was welling appear, okay for discharge home for quarantine.                      ED Course as of Mar 24 2007   Tue Mar 24, 2020   1914 BP: 128/83 [AT]   1914 Temp: 98.5 °F (36.9 °C) [AT]   1914 Temp src: Oral [AT]   1914 Pulse: 72 [AT]   1914 SpO2: 98 % [AT]      ED Course User Index  [AT] BUD Platt                Clinical Impression:       ICD-10-CM ICD-9-CM   1. URI with cough and congestion J06.9 465.9   2. SOB (shortness of breath) R06.02 786.05   3. Generalized body aches R52 780.96                                BUD Platt  03/25/20 3010

## 2020-03-25 NOTE — TELEPHONE ENCOUNTER
Called pt to let her know that the order for her supply's was updated as well as to let her know that she can contact [employer] to get those supply's. I also gave the pt the number to call.

## 2020-05-08 ENCOUNTER — TELEPHONE (OUTPATIENT)
Dept: ORTHOPEDICS | Facility: CLINIC | Age: 45
End: 2020-05-08

## 2020-05-08 NOTE — TELEPHONE ENCOUNTER
Called pt to see about rescheduling appt for swelling in the knee no answer, left msg for pt to give us a call back.

## 2020-05-27 ENCOUNTER — OFFICE VISIT (OUTPATIENT)
Dept: BARIATRICS | Facility: CLINIC | Age: 45
End: 2020-05-27
Payer: MEDICARE

## 2020-05-27 VITALS
WEIGHT: 192.81 LBS | HEART RATE: 80 BPM | DIASTOLIC BLOOD PRESSURE: 73 MMHG | RESPIRATION RATE: 16 BRPM | TEMPERATURE: 98 F | BODY MASS INDEX: 30.99 KG/M2 | HEIGHT: 66 IN | SYSTOLIC BLOOD PRESSURE: 135 MMHG

## 2020-05-27 DIAGNOSIS — E66.2 MORBID (SEVERE) OBESITY WITH ALVEOLAR HYPOVENTILATION: Primary | ICD-10-CM

## 2020-05-27 DIAGNOSIS — E66.01 MORBID OBESITY: ICD-10-CM

## 2020-05-27 DIAGNOSIS — E11.42 TYPE 2 DIABETES MELLITUS WITH PERIPHERAL NEUROPATHY: ICD-10-CM

## 2020-05-27 DIAGNOSIS — G47.33 OSA ON CPAP: ICD-10-CM

## 2020-05-27 PROCEDURE — 3078F PR MOST RECENT DIASTOLIC BLOOD PRESSURE < 80 MM HG: ICD-10-PCS | Mod: CPTII,S$GLB,, | Performed by: SURGERY

## 2020-05-27 PROCEDURE — 3008F BODY MASS INDEX DOCD: CPT | Mod: CPTII,S$GLB,, | Performed by: SURGERY

## 2020-05-27 PROCEDURE — 3078F DIAST BP <80 MM HG: CPT | Mod: CPTII,S$GLB,, | Performed by: SURGERY

## 2020-05-27 PROCEDURE — 3044F HG A1C LEVEL LT 7.0%: CPT | Mod: CPTII,S$GLB,, | Performed by: SURGERY

## 2020-05-27 PROCEDURE — 99213 PR OFFICE/OUTPT VISIT, EST, LEVL III, 20-29 MIN: ICD-10-PCS | Mod: S$GLB,,, | Performed by: SURGERY

## 2020-05-27 PROCEDURE — 3044F PR MOST RECENT HEMOGLOBIN A1C LEVEL <7.0%: ICD-10-PCS | Mod: CPTII,S$GLB,, | Performed by: SURGERY

## 2020-05-27 PROCEDURE — 3075F SYST BP GE 130 - 139MM HG: CPT | Mod: CPTII,S$GLB,, | Performed by: SURGERY

## 2020-05-27 PROCEDURE — 3008F PR BODY MASS INDEX (BMI) DOCUMENTED: ICD-10-PCS | Mod: CPTII,S$GLB,, | Performed by: SURGERY

## 2020-05-27 PROCEDURE — 99999 PR PBB SHADOW E&M-EST. PATIENT-LVL IV: CPT | Mod: PBBFAC,,, | Performed by: SURGERY

## 2020-05-27 PROCEDURE — 99999 PR PBB SHADOW E&M-EST. PATIENT-LVL IV: ICD-10-PCS | Mod: PBBFAC,,, | Performed by: SURGERY

## 2020-05-27 PROCEDURE — 3075F PR MOST RECENT SYSTOLIC BLOOD PRESS GE 130-139MM HG: ICD-10-PCS | Mod: CPTII,S$GLB,, | Performed by: SURGERY

## 2020-05-27 PROCEDURE — 99213 OFFICE O/P EST LOW 20 MIN: CPT | Mod: S$GLB,,, | Performed by: SURGERY

## 2020-05-27 RX ORDER — GABAPENTIN 100 MG/1
CAPSULE ORAL
Status: ON HOLD | COMMUNITY
Start: 2020-03-22 | End: 2021-04-15 | Stop reason: HOSPADM

## 2020-05-27 RX ORDER — NAPROXEN 500 MG/1
TABLET ORAL
COMMUNITY
Start: 2020-05-04 | End: 2021-08-03

## 2020-05-27 RX ORDER — OMEPRAZOLE 20 MG/1
20 CAPSULE, DELAYED RELEASE ORAL DAILY
Qty: 30 CAPSULE | Refills: 3 | Status: SHIPPED | OUTPATIENT
Start: 2020-05-27 | End: 2022-01-20

## 2020-05-27 RX ORDER — ATORVASTATIN CALCIUM 40 MG/1
40 TABLET, FILM COATED ORAL DAILY
COMMUNITY
Start: 2020-05-14

## 2020-05-27 NOTE — PROGRESS NOTES
Post Op Note    Surgery: gastric sleeve surgery  Date: 5/13/19  Initial weight: 240  Last weight: 196  Current weight: 192    Constipation: none  Reflux: still having heartburn reflux- refill prilosec  Vomiting:     Diet:  Breakfast:  ensure  Lunch: broccoli chicken  Dinner: broccoli chicken    Exercise:  Walking some    MVI: 2 mvi, calcium, b12    Vitals:    05/27/20 1158   BP: 135/73   Pulse: 80   Resp: 16   Temp: 98.3 °F (36.8 °C)       Body comp:  Fat Percent:  41.6 %  Fat Mass:  80.2 lb  FFM:  112.6 lb  TBW: 80.8 lb  TBW %:  41.9 %  BMR: 1569 kcal    PE:  NAD  RRR  Soft/nt/nd  Incisions: well healed     Labs: none    A/P: s/p sleeve     Counseling for patient:    Diet: ok for healthy eating  Exercise:  Exercise as much as possible  Vitamins:  Daily multivitamin, continue b vitamins  Co morbidities:     1. Morbid (severe) obesity with alveolar hypoventilation     2. Type 2 diabetes mellitus with peripheral neuropathy     3. Morbid obesity     4. JOSUE on CPAP         -All above: Evaluated, monitored, and treated with diet and exercise program.        : I met with the patient for 15 minutes and counseled her for over 50% of that time

## 2020-07-09 DIAGNOSIS — M51.36 DEGENERATIVE DISC DISEASE, LUMBAR: Primary | ICD-10-CM

## 2020-07-10 ENCOUNTER — HOSPITAL ENCOUNTER (OUTPATIENT)
Dept: RADIOLOGY | Facility: HOSPITAL | Age: 45
Discharge: HOME | End: 2020-07-10
Payer: MEDICARE

## 2020-07-10 DIAGNOSIS — M51.36 OTHER INTERVERTEBRAL DISC DEGENERATION, LUMBAR REGION: ICD-10-CM

## 2020-07-10 DIAGNOSIS — M51.36 DEGENERATIVE DISC DISEASE, LUMBAR: ICD-10-CM

## 2020-07-10 DIAGNOSIS — M51.36 OTHER INTERVERTEBRAL DISC DEGENERATION, LUMBAR REGION: Primary | ICD-10-CM

## 2020-07-10 PROCEDURE — 72131 CT LUMBAR SPINE W/O DYE: CPT | Mod: TC

## 2020-07-10 PROCEDURE — 72131 CT LUMBAR SPINE W/O DYE: CPT | Mod: 26,,, | Performed by: INTERNAL MEDICINE

## 2020-07-10 PROCEDURE — 72131 CT LUMBAR SPINE WITHOUT CONTRAST: ICD-10-PCS | Mod: 26,,, | Performed by: INTERNAL MEDICINE

## 2020-08-07 ENCOUNTER — OFFICE VISIT (OUTPATIENT)
Dept: NEUROSURGERY | Facility: CLINIC | Age: 45
End: 2020-08-07
Payer: MEDICARE

## 2020-08-07 VITALS
HEART RATE: 63 BPM | HEIGHT: 66 IN | SYSTOLIC BLOOD PRESSURE: 130 MMHG | DIASTOLIC BLOOD PRESSURE: 85 MMHG | WEIGHT: 198.63 LBS | BODY MASS INDEX: 31.92 KG/M2

## 2020-08-07 DIAGNOSIS — M54.9 DORSALGIA, UNSPECIFIED: ICD-10-CM

## 2020-08-07 DIAGNOSIS — R29.2: Primary | ICD-10-CM

## 2020-08-07 DIAGNOSIS — R20.2 NUMBNESS AND TINGLING OF RIGHT LOWER EXTREMITY: ICD-10-CM

## 2020-08-07 DIAGNOSIS — M54.2 CERVICALGIA: ICD-10-CM

## 2020-08-07 DIAGNOSIS — R20.0 NUMBNESS AND TINGLING OF RIGHT LOWER EXTREMITY: ICD-10-CM

## 2020-08-07 PROCEDURE — 3075F PR MOST RECENT SYSTOLIC BLOOD PRESS GE 130-139MM HG: ICD-10-PCS | Mod: CPTII,S$GLB,, | Performed by: PHYSICIAN ASSISTANT

## 2020-08-07 PROCEDURE — 3079F DIAST BP 80-89 MM HG: CPT | Mod: CPTII,S$GLB,, | Performed by: PHYSICIAN ASSISTANT

## 2020-08-07 PROCEDURE — 99214 PR OFFICE/OUTPT VISIT, EST, LEVL IV, 30-39 MIN: ICD-10-PCS | Mod: S$GLB,,, | Performed by: PHYSICIAN ASSISTANT

## 2020-08-07 PROCEDURE — 3079F PR MOST RECENT DIASTOLIC BLOOD PRESSURE 80-89 MM HG: ICD-10-PCS | Mod: CPTII,S$GLB,, | Performed by: PHYSICIAN ASSISTANT

## 2020-08-07 PROCEDURE — 99214 OFFICE O/P EST MOD 30 MIN: CPT | Mod: S$GLB,,, | Performed by: PHYSICIAN ASSISTANT

## 2020-08-07 PROCEDURE — 99999 PR PBB SHADOW E&M-EST. PATIENT-LVL V: ICD-10-PCS | Mod: PBBFAC,,, | Performed by: PHYSICIAN ASSISTANT

## 2020-08-07 PROCEDURE — 99999 PR PBB SHADOW E&M-EST. PATIENT-LVL V: CPT | Mod: PBBFAC,,, | Performed by: PHYSICIAN ASSISTANT

## 2020-08-07 PROCEDURE — 3075F SYST BP GE 130 - 139MM HG: CPT | Mod: CPTII,S$GLB,, | Performed by: PHYSICIAN ASSISTANT

## 2020-08-07 PROCEDURE — 3008F PR BODY MASS INDEX (BMI) DOCUMENTED: ICD-10-PCS | Mod: CPTII,S$GLB,, | Performed by: PHYSICIAN ASSISTANT

## 2020-08-07 PROCEDURE — 3008F BODY MASS INDEX DOCD: CPT | Mod: CPTII,S$GLB,, | Performed by: PHYSICIAN ASSISTANT

## 2020-08-07 RX ORDER — CITALOPRAM 20 MG/1
20 TABLET, FILM COATED ORAL DAILY
COMMUNITY
Start: 2020-07-24

## 2020-08-07 RX ORDER — BUPROPION HYDROCHLORIDE 150 MG/1
TABLET, EXTENDED RELEASE ORAL
COMMUNITY
Start: 2020-07-24 | End: 2022-01-20

## 2020-08-07 RX ORDER — LORATADINE 10 MG/1
10 TABLET ORAL DAILY
COMMUNITY
Start: 2020-07-16 | End: 2022-01-20

## 2020-08-07 RX ORDER — MELOXICAM 15 MG/1
15 TABLET ORAL DAILY
COMMUNITY
Start: 2020-07-21

## 2020-08-07 RX ORDER — KETOTIFEN FUMARATE 0.35 MG/ML
SOLUTION/ DROPS OPHTHALMIC
COMMUNITY
Start: 2020-07-16

## 2020-08-07 NOTE — LETTER
August 7, 2020      GIACOMO Amezcua  [address] [facility] [address]           [facility]  [address]  [facility] [place] [address]  Phone: [phone]  Fax: [phone]          Patient: Juan Carlos Turner   MR Number: [id number]   YOB: 1975   Date of Visit: 8/7/2020       Dear Radha Meyer:    Thank you for referring Juan Carlos Turner to me for evaluation. Attached you will find relevant portions of my assessment and plan of care.    If you have questions, please do not hesitate to call me. I look forward to following Juan Carlos Turner along with you.    Sincerely,    Carmen Oakes PA-C    Enclosure  CC:  No Recipients    If you would like to receive this communication electronically, please contact [email or web address] or [phone] to request more information on [employer] access.    For providers and/or their staff who would like to refer a patient to [facility], please contact us through our one-stop-shop provider referral line, [facility], at [phone].    If you feel you have received this communication in error or would no longer like to receive these types of communications, please e-mail [email or web address]

## 2020-08-11 ENCOUNTER — HOSPITAL ENCOUNTER (OUTPATIENT)
Dept: RADIOLOGY | Facility: OTHER | Age: 45
Discharge: HOME | End: 2020-08-11
Attending: PHYSICIAN ASSISTANT
Payer: MEDICARE

## 2020-08-11 DIAGNOSIS — R29.2: ICD-10-CM

## 2020-08-11 DIAGNOSIS — M54.2 CERVICALGIA: ICD-10-CM

## 2020-08-11 DIAGNOSIS — R20.0 NUMBNESS AND TINGLING OF RIGHT LOWER EXTREMITY: ICD-10-CM

## 2020-08-11 DIAGNOSIS — M54.9 DORSALGIA, UNSPECIFIED: ICD-10-CM

## 2020-08-11 DIAGNOSIS — R20.2 NUMBNESS AND TINGLING OF RIGHT LOWER EXTREMITY: ICD-10-CM

## 2020-08-11 PROCEDURE — 72141 MRI NECK SPINE W/O DYE: CPT | Mod: 26,,, | Performed by: RADIOLOGY

## 2020-08-11 PROCEDURE — 72148 MRI LUMBAR SPINE W/O DYE: CPT | Mod: TC

## 2020-08-11 PROCEDURE — 72141 MRI NECK SPINE W/O DYE: CPT | Mod: TC

## 2020-08-11 PROCEDURE — 72141 MRI CERVICAL SPINE WITHOUT CONTRAST: ICD-10-PCS | Mod: 26,,, | Performed by: RADIOLOGY

## 2020-08-11 PROCEDURE — 72148 MRI LUMBAR SPINE WITHOUT CONTRAST: ICD-10-PCS | Mod: 26,,, | Performed by: RADIOLOGY

## 2020-08-11 PROCEDURE — 72148 MRI LUMBAR SPINE W/O DYE: CPT | Mod: 26,,, | Performed by: RADIOLOGY

## 2020-08-24 ENCOUNTER — OFFICE VISIT (OUTPATIENT)
Dept: NEUROSURGERY | Facility: CLINIC | Age: 45
End: 2020-08-24
Payer: MEDICARE

## 2020-08-24 VITALS
HEIGHT: 66 IN | BODY MASS INDEX: 31.82 KG/M2 | TEMPERATURE: 100 F | DIASTOLIC BLOOD PRESSURE: 94 MMHG | OXYGEN SATURATION: 95 % | SYSTOLIC BLOOD PRESSURE: 152 MMHG | HEART RATE: 61 BPM | WEIGHT: 198 LBS

## 2020-08-24 DIAGNOSIS — M54.50 CHRONIC BILATERAL LOW BACK PAIN, UNSPECIFIED WHETHER SCIATICA PRESENT: Primary | ICD-10-CM

## 2020-08-24 DIAGNOSIS — G89.29 CHRONIC BILATERAL LOW BACK PAIN, UNSPECIFIED WHETHER SCIATICA PRESENT: Primary | ICD-10-CM

## 2020-08-24 DIAGNOSIS — M51.16 LUMBAR DISC HERNIATION WITH RADICULOPATHY: Primary | ICD-10-CM

## 2020-08-24 PROCEDURE — 99999 PR PBB SHADOW E&M-EST. PATIENT-LVL V: CPT | Mod: PBBFAC,,, | Performed by: PHYSICIAN ASSISTANT

## 2020-08-24 PROCEDURE — 3077F PR MOST RECENT SYSTOLIC BLOOD PRESSURE >= 140 MM HG: ICD-10-PCS | Mod: CPTII,S$GLB,, | Performed by: PHYSICIAN ASSISTANT

## 2020-08-24 PROCEDURE — 3008F BODY MASS INDEX DOCD: CPT | Mod: CPTII,S$GLB,, | Performed by: PHYSICIAN ASSISTANT

## 2020-08-24 PROCEDURE — 99999 PR PBB SHADOW E&M-EST. PATIENT-LVL V: ICD-10-PCS | Mod: PBBFAC,,, | Performed by: PHYSICIAN ASSISTANT

## 2020-08-24 PROCEDURE — 3080F DIAST BP >= 90 MM HG: CPT | Mod: CPTII,S$GLB,, | Performed by: PHYSICIAN ASSISTANT

## 2020-08-24 PROCEDURE — 99214 PR OFFICE/OUTPT VISIT, EST, LEVL IV, 30-39 MIN: ICD-10-PCS | Mod: S$GLB,,, | Performed by: PHYSICIAN ASSISTANT

## 2020-08-24 PROCEDURE — 99214 OFFICE O/P EST MOD 30 MIN: CPT | Mod: S$GLB,,, | Performed by: PHYSICIAN ASSISTANT

## 2020-08-24 PROCEDURE — 3077F SYST BP >= 140 MM HG: CPT | Mod: CPTII,S$GLB,, | Performed by: PHYSICIAN ASSISTANT

## 2020-08-24 PROCEDURE — 3008F PR BODY MASS INDEX (BMI) DOCUMENTED: ICD-10-PCS | Mod: CPTII,S$GLB,, | Performed by: PHYSICIAN ASSISTANT

## 2020-08-24 PROCEDURE — 3080F PR MOST RECENT DIASTOLIC BLOOD PRESSURE >= 90 MM HG: ICD-10-PCS | Mod: CPTII,S$GLB,, | Performed by: PHYSICIAN ASSISTANT
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
## 2020-10-02 PROCEDURE — 95911 NRV CNDJ TEST 9-10 STUDIES: CPT | Mod: S$GLB,,, | Performed by: NEUROLOGICAL SURGERY

## 2020-10-02 PROCEDURE — 95886 PR EMG COMPLETE, W/ NERVE CONDUCTION STUDIES, 5+ MUSCLES: ICD-10-PCS | Mod: S$GLB,,, | Performed by: NEUROLOGICAL SURGERY

## 2020-10-02 PROCEDURE — 95886 MUSC TEST DONE W/N TEST COMP: CPT | Mod: S$GLB,,, | Performed by: NEUROLOGICAL SURGERY

## 2020-10-02 PROCEDURE — 95911 PR NERVE CONDUCTION STUDY; 9-10 STUDIES: ICD-10-PCS | Mod: S$GLB,,, | Performed by: NEUROLOGICAL SURGERY

## 2020-10-06 ENCOUNTER — PROCEDURE VISIT (OUTPATIENT)
Dept: NEUROLOGY | Facility: CLINIC | Age: 45
End: 2020-10-06
Payer: MEDICARE

## 2020-10-06 VITALS — BODY MASS INDEX: 31.66 KG/M2 | WEIGHT: 197 LBS | HEIGHT: 66 IN

## 2020-10-06 DIAGNOSIS — M51.16 LUMBAR DISC HERNIATION WITH RADICULOPATHY: ICD-10-CM

## 2020-11-18 ENCOUNTER — TELEPHONE (OUTPATIENT)
Dept: NEUROLOGY | Facility: CLINIC | Age: 45
End: 2020-11-18

## 2020-11-18 NOTE — TELEPHONE ENCOUNTER
----- Message from Karol Walker sent at 11/18/2020  9:33 AM CST -----  Regarding: self [phone]  ..Type: Patient Call Back    Who called: self    What is the request in detail: Requesting to have results from nerve test printed out for  on friday    Can the clinic reply by [facility]? Call back    Would the patient rather a call back or a response via [facility]?      Best call back number: [phone]

## 2021-03-05 ENCOUNTER — OFFICE VISIT (OUTPATIENT)
Dept: ORTHOPEDICS | Facility: CLINIC | Age: 46
End: 2021-03-05
Payer: MEDICARE

## 2021-03-05 DIAGNOSIS — M23.51 CHRONIC INSTABILITY OF RIGHT KNEE: ICD-10-CM

## 2021-03-05 PROCEDURE — 99213 PR OFFICE/OUTPT VISIT, EST, LEVL III, 20-29 MIN: ICD-10-PCS | Mod: S$GLB,,, | Performed by: NURSE PRACTITIONER

## 2021-03-05 PROCEDURE — 99999 PR PBB SHADOW E&M-EST. PATIENT-LVL III: ICD-10-PCS | Mod: PBBFAC,,, | Performed by: NURSE PRACTITIONER

## 2021-03-05 PROCEDURE — 99213 OFFICE O/P EST LOW 20 MIN: CPT | Mod: S$GLB,,, | Performed by: NURSE PRACTITIONER

## 2021-03-05 PROCEDURE — 1125F PR PAIN SEVERITY QUANTIFIED, PAIN PRESENT: ICD-10-PCS | Mod: S$GLB,,, | Performed by: NURSE PRACTITIONER

## 2021-03-05 PROCEDURE — 99999 PR PBB SHADOW E&M-EST. PATIENT-LVL III: CPT | Mod: PBBFAC,,, | Performed by: NURSE PRACTITIONER

## 2021-03-05 PROCEDURE — 1125F AMNT PAIN NOTED PAIN PRSNT: CPT | Mod: S$GLB,,, | Performed by: NURSE PRACTITIONER

## 2021-03-18 ENCOUNTER — HOSPITAL ENCOUNTER (OUTPATIENT)
Dept: RADIOLOGY | Facility: HOSPITAL | Age: 46
Discharge: HOME | End: 2021-03-18
Attending: NURSE PRACTITIONER
Payer: MEDICARE

## 2021-03-18 ENCOUNTER — TELEPHONE (OUTPATIENT)
Dept: ORTHOPEDICS | Facility: CLINIC | Age: 46
End: 2021-03-18

## 2021-03-18 DIAGNOSIS — M23.51 CHRONIC INSTABILITY OF RIGHT KNEE: ICD-10-CM

## 2021-03-18 PROCEDURE — 73721 MRI KNEE WITHOUT CONTRAST RIGHT: ICD-10-PCS | Mod: 26,RT,, | Performed by: RADIOLOGY

## 2021-03-18 PROCEDURE — 73721 MRI JNT OF LWR EXTRE W/O DYE: CPT | Mod: TC,RT

## 2021-03-18 PROCEDURE — 73721 MRI JNT OF LWR EXTRE W/O DYE: CPT | Mod: 26,RT,, | Performed by: RADIOLOGY

## 2021-03-24 ENCOUNTER — OFFICE VISIT (OUTPATIENT)
Dept: SPORTS MEDICINE | Facility: CLINIC | Age: 46
End: 2021-03-24
Payer: MEDICARE

## 2021-03-24 ENCOUNTER — HOSPITAL ENCOUNTER (OUTPATIENT)
Dept: RADIOLOGY | Facility: HOSPITAL | Age: 46
Discharge: HOME | End: 2021-03-24
Attending: ORTHOPAEDIC SURGERY
Payer: MEDICARE

## 2021-03-24 VITALS
HEIGHT: 66 IN | DIASTOLIC BLOOD PRESSURE: 87 MMHG | SYSTOLIC BLOOD PRESSURE: 135 MMHG | WEIGHT: 205.31 LBS | HEART RATE: 67 BPM | BODY MASS INDEX: 33 KG/M2

## 2021-03-24 DIAGNOSIS — M25.561 RIGHT KNEE PAIN, UNSPECIFIED CHRONICITY: Primary | ICD-10-CM

## 2021-03-24 DIAGNOSIS — M25.561 RIGHT KNEE PAIN, UNSPECIFIED CHRONICITY: ICD-10-CM

## 2021-03-24 DIAGNOSIS — M23.301 LATERAL MENISCUS DERANGEMENT, LEFT: ICD-10-CM

## 2021-03-24 PROCEDURE — 99203 OFFICE O/P NEW LOW 30 MIN: CPT | Mod: S$GLB,,, | Performed by: ORTHOPAEDIC SURGERY

## 2021-03-24 PROCEDURE — 1125F AMNT PAIN NOTED PAIN PRSNT: CPT | Mod: S$GLB,,, | Performed by: ORTHOPAEDIC SURGERY

## 2021-03-24 PROCEDURE — 99999 PR PBB SHADOW E&M-EST. PATIENT-LVL IV: CPT | Mod: PBBFAC,,, | Performed by: ORTHOPAEDIC SURGERY

## 2021-03-24 PROCEDURE — 3008F PR BODY MASS INDEX (BMI) DOCUMENTED: ICD-10-PCS | Mod: CPTII,S$GLB,, | Performed by: ORTHOPAEDIC SURGERY

## 2021-03-24 PROCEDURE — 73564 XR KNEE ORTHO BILAT WITH FLEXION: ICD-10-PCS | Mod: 26,50,, | Performed by: RADIOLOGY

## 2021-03-24 PROCEDURE — 3079F DIAST BP 80-89 MM HG: CPT | Mod: CPTII,S$GLB,, | Performed by: ORTHOPAEDIC SURGERY

## 2021-03-24 PROCEDURE — 3075F PR MOST RECENT SYSTOLIC BLOOD PRESS GE 130-139MM HG: ICD-10-PCS | Mod: CPTII,S$GLB,, | Performed by: ORTHOPAEDIC SURGERY

## 2021-03-24 PROCEDURE — 99999 PR PBB SHADOW E&M-EST. PATIENT-LVL IV: ICD-10-PCS | Mod: PBBFAC,,, | Performed by: ORTHOPAEDIC SURGERY

## 2021-03-24 PROCEDURE — 99203 PR OFFICE/OUTPT VISIT, NEW, LEVL III, 30-44 MIN: ICD-10-PCS | Mod: S$GLB,,, | Performed by: ORTHOPAEDIC SURGERY

## 2021-03-24 PROCEDURE — 73564 X-RAY EXAM KNEE 4 OR MORE: CPT | Mod: TC,50

## 2021-03-24 PROCEDURE — 3075F SYST BP GE 130 - 139MM HG: CPT | Mod: CPTII,S$GLB,, | Performed by: ORTHOPAEDIC SURGERY

## 2021-03-24 PROCEDURE — 3008F BODY MASS INDEX DOCD: CPT | Mod: CPTII,S$GLB,, | Performed by: ORTHOPAEDIC SURGERY

## 2021-03-24 PROCEDURE — 3079F PR MOST RECENT DIASTOLIC BLOOD PRESSURE 80-89 MM HG: ICD-10-PCS | Mod: CPTII,S$GLB,, | Performed by: ORTHOPAEDIC SURGERY

## 2021-03-24 PROCEDURE — 73564 X-RAY EXAM KNEE 4 OR MORE: CPT | Mod: 26,50,, | Performed by: RADIOLOGY

## 2021-03-24 PROCEDURE — 1125F PR PAIN SEVERITY QUANTIFIED, PAIN PRESENT: ICD-10-PCS | Mod: S$GLB,,, | Performed by: ORTHOPAEDIC SURGERY

## 2021-03-25 DIAGNOSIS — M67.50 PLICA SYNDROME: ICD-10-CM

## 2021-03-25 DIAGNOSIS — M23.200 OLD TEAR OF LATERAL MENISCUS OF RIGHT KNEE, UNSPECIFIED TEAR TYPE: Primary | ICD-10-CM

## 2021-03-25 DIAGNOSIS — M22.41 CHONDROMALACIA OF RIGHT PATELLA: ICD-10-CM

## 2021-04-12 ENCOUNTER — OFFICE VISIT (OUTPATIENT)
Dept: SPORTS MEDICINE | Facility: CLINIC | Age: 46
End: 2021-04-12
Payer: MEDICARE

## 2021-04-12 VITALS
SYSTOLIC BLOOD PRESSURE: 135 MMHG | BODY MASS INDEX: 31.82 KG/M2 | DIASTOLIC BLOOD PRESSURE: 91 MMHG | HEIGHT: 66 IN | HEART RATE: 70 BPM | WEIGHT: 198 LBS

## 2021-04-12 DIAGNOSIS — M67.50 PLICA SYNDROME: ICD-10-CM

## 2021-04-12 DIAGNOSIS — Z01.818 PRE-OP TESTING: Primary | ICD-10-CM

## 2021-04-12 DIAGNOSIS — M23.200 OLD TEAR OF LATERAL MENISCUS OF RIGHT KNEE, UNSPECIFIED TEAR TYPE: Primary | ICD-10-CM

## 2021-04-12 DIAGNOSIS — Z01.818 PRE-OP TESTING: ICD-10-CM

## 2021-04-12 LAB — SARS-COV-2 RNA RESP QL NAA+PROBE: NOT DETECTED

## 2021-04-12 PROCEDURE — 99499 UNLISTED E&M SERVICE: CPT | Mod: S$GLB,,, | Performed by: PHYSICIAN ASSISTANT

## 2021-04-12 PROCEDURE — 99999 PR PBB SHADOW E&M-EST. PATIENT-LVL IV: CPT | Mod: PBBFAC,,, | Performed by: PHYSICIAN ASSISTANT

## 2021-04-12 PROCEDURE — 99499 NO LOS: ICD-10-PCS | Mod: S$GLB,,, | Performed by: PHYSICIAN ASSISTANT

## 2021-04-12 PROCEDURE — 99999 PR PBB SHADOW E&M-EST. PATIENT-LVL IV: ICD-10-PCS | Mod: PBBFAC,,, | Performed by: PHYSICIAN ASSISTANT

## 2021-04-12 PROCEDURE — U0003 INFECTIOUS AGENT DETECTION BY NUCLEIC ACID (DNA OR RNA); SEVERE ACUTE RESPIRATORY SYNDROME CORONAVIRUS 2 (SARS-COV-2) (CORONAVIRUS DISEASE [COVID-19]), AMPLIFIED PROBE TECHNIQUE, MAKING USE OF HIGH THROUGHPUT TECHNOLOGIES AS DESCRIBED BY CMS-2020-01-R: HCPCS | Performed by: ORTHOPAEDIC SURGERY

## 2021-04-12 PROCEDURE — 1125F PR PAIN SEVERITY QUANTIFIED, PAIN PRESENT: ICD-10-PCS | Mod: S$GLB,,, | Performed by: PHYSICIAN ASSISTANT

## 2021-04-12 PROCEDURE — 1125F AMNT PAIN NOTED PAIN PRSNT: CPT | Mod: S$GLB,,, | Performed by: PHYSICIAN ASSISTANT

## 2021-04-12 PROCEDURE — 3008F PR BODY MASS INDEX (BMI) DOCUMENTED: ICD-10-PCS | Mod: CPTII,S$GLB,, | Performed by: PHYSICIAN ASSISTANT

## 2021-04-12 PROCEDURE — U0005 INFEC AGEN DETEC AMPLI PROBE: HCPCS | Performed by: ORTHOPAEDIC SURGERY

## 2021-04-12 PROCEDURE — 3008F BODY MASS INDEX DOCD: CPT | Mod: CPTII,S$GLB,, | Performed by: PHYSICIAN ASSISTANT

## 2021-04-13 RX ORDER — ASPIRIN 81 MG/1
81 TABLET ORAL DAILY
Qty: 28 TABLET | Refills: 0 | COMMUNITY
Start: 2021-04-13 | End: 2022-01-20

## 2021-04-13 RX ORDER — PROMETHAZINE HYDROCHLORIDE 25 MG/1
25 TABLET ORAL EVERY 6 HOURS PRN
Qty: 12 TABLET | Refills: 0 | Status: SHIPPED | OUTPATIENT
Start: 2021-04-13 | End: 2021-07-21

## 2021-04-13 RX ORDER — SODIUM CHLORIDE 9 MG/ML
INJECTION, SOLUTION INTRAVENOUS CONTINUOUS
Status: CANCELLED | OUTPATIENT
Start: 2021-04-13

## 2021-04-13 RX ORDER — HYDROCODONE BITARTRATE AND ACETAMINOPHEN 10; 325 MG/1; MG/1
TABLET ORAL
Qty: 15 TABLET | Refills: 0 | Status: SHIPPED | OUTPATIENT
Start: 2021-04-13 | End: 2021-04-30

## 2021-04-13 RX ORDER — CEFAZOLIN SODIUM 2 G/50ML
2 SOLUTION INTRAVENOUS
Status: CANCELLED | OUTPATIENT
Start: 2021-04-13

## 2021-04-13 RX ORDER — TRAMADOL HYDROCHLORIDE 50 MG/1
50-100 TABLET ORAL EVERY 6 HOURS PRN
Qty: 21 TABLET | Refills: 0 | Status: SHIPPED | OUTPATIENT
Start: 2021-04-13 | End: 2021-04-30 | Stop reason: SDUPTHER

## 2021-04-15 ENCOUNTER — HOSPITAL ENCOUNTER (OUTPATIENT)
Facility: HOSPITAL | Age: 46
Discharge: HOME | End: 2021-04-15
Attending: ORTHOPAEDIC SURGERY | Admitting: ORTHOPAEDIC SURGERY
Payer: MEDICARE

## 2021-04-15 ENCOUNTER — ANESTHESIA (OUTPATIENT)
Dept: SURGERY | Facility: HOSPITAL | Age: 46
End: 2021-04-15
Payer: MEDICARE

## 2021-04-15 ENCOUNTER — ANESTHESIA EVENT (OUTPATIENT)
Dept: SURGERY | Facility: HOSPITAL | Age: 46
End: 2021-04-15
Payer: MEDICARE

## 2021-04-15 VITALS
OXYGEN SATURATION: 96 % | DIASTOLIC BLOOD PRESSURE: 92 MMHG | TEMPERATURE: 98 F | RESPIRATION RATE: 16 BRPM | WEIGHT: 198 LBS | BODY MASS INDEX: 31.82 KG/M2 | HEIGHT: 66 IN | SYSTOLIC BLOOD PRESSURE: 134 MMHG | HEART RATE: 59 BPM

## 2021-04-15 DIAGNOSIS — M23.200 OLD TEAR OF LATERAL MENISCUS OF RIGHT KNEE, UNSPECIFIED TEAR TYPE: ICD-10-CM

## 2021-04-15 DIAGNOSIS — M67.50 PLICA SYNDROME: ICD-10-CM

## 2021-04-15 LAB
POCT GLUCOSE: 112 MG/DL (ref 70–110)
POCT GLUCOSE: 113 MG/DL (ref 70–110)

## 2021-04-15 PROCEDURE — 63600175 PHARM REV CODE 636 W HCPCS: Performed by: PHYSICIAN ASSISTANT

## 2021-04-15 PROCEDURE — 25000003 PHARM REV CODE 250: Performed by: NURSE ANESTHETIST, CERTIFIED REGISTERED

## 2021-04-15 PROCEDURE — 37000009 HC ANESTHESIA EA ADD 15 MINS: Performed by: ORTHOPAEDIC SURGERY

## 2021-04-15 PROCEDURE — 25000003 PHARM REV CODE 250: Performed by: ANESTHESIOLOGY

## 2021-04-15 PROCEDURE — 27200651 HC AIRWAY, LMA: Performed by: NURSE ANESTHETIST, CERTIFIED REGISTERED

## 2021-04-15 PROCEDURE — 25000003 PHARM REV CODE 250: Performed by: STUDENT IN AN ORGANIZED HEALTH CARE EDUCATION/TRAINING PROGRAM

## 2021-04-15 PROCEDURE — 71000015 HC POSTOP RECOV 1ST HR: Performed by: ORTHOPAEDIC SURGERY

## 2021-04-15 PROCEDURE — 71000033 HC RECOVERY, INTIAL HOUR: Performed by: ORTHOPAEDIC SURGERY

## 2021-04-15 PROCEDURE — 94761 N-INVAS EAR/PLS OXIMETRY MLT: CPT

## 2021-04-15 PROCEDURE — 29881 PR KNEE SCOPE SINGLE MENISECECTOMY: ICD-10-PCS | Mod: RT,,, | Performed by: ORTHOPAEDIC SURGERY

## 2021-04-15 PROCEDURE — 36000710: Performed by: ORTHOPAEDIC SURGERY

## 2021-04-15 PROCEDURE — 36000711: Performed by: ORTHOPAEDIC SURGERY

## 2021-04-15 PROCEDURE — 71000039 HC RECOVERY, EACH ADD'L HOUR: Performed by: ORTHOPAEDIC SURGERY

## 2021-04-15 PROCEDURE — D9220A PRA ANESTHESIA: Mod: CRNA,,, | Performed by: NURSE ANESTHETIST, CERTIFIED REGISTERED

## 2021-04-15 PROCEDURE — 63600175 PHARM REV CODE 636 W HCPCS: Performed by: ANESTHESIOLOGY

## 2021-04-15 PROCEDURE — 63600175 PHARM REV CODE 636 W HCPCS: Performed by: NURSE ANESTHETIST, CERTIFIED REGISTERED

## 2021-04-15 PROCEDURE — 27201423 OPTIME MED/SURG SUP & DEVICES STERILE SUPPLY: Performed by: ORTHOPAEDIC SURGERY

## 2021-04-15 PROCEDURE — 25000003 PHARM REV CODE 250: Performed by: PHYSICIAN ASSISTANT

## 2021-04-15 PROCEDURE — D9220A PRA ANESTHESIA: ICD-10-PCS | Mod: ANES,,, | Performed by: ANESTHESIOLOGY

## 2021-04-15 PROCEDURE — 82962 GLUCOSE BLOOD TEST: CPT | Performed by: ORTHOPAEDIC SURGERY

## 2021-04-15 PROCEDURE — D9220A PRA ANESTHESIA: Mod: ANES,,, | Performed by: ANESTHESIOLOGY

## 2021-04-15 PROCEDURE — D9220A PRA ANESTHESIA: ICD-10-PCS | Mod: CRNA,,, | Performed by: NURSE ANESTHETIST, CERTIFIED REGISTERED

## 2021-04-15 PROCEDURE — 25000003 PHARM REV CODE 250: Performed by: ORTHOPAEDIC SURGERY

## 2021-04-15 PROCEDURE — 37000008 HC ANESTHESIA 1ST 15 MINUTES: Performed by: ORTHOPAEDIC SURGERY

## 2021-04-15 PROCEDURE — 63600175 PHARM REV CODE 636 W HCPCS: Performed by: ORTHOPAEDIC SURGERY

## 2021-04-15 PROCEDURE — 29881 ARTHRS KNE SRG MNISECTMY M/L: CPT | Mod: RT,,, | Performed by: ORTHOPAEDIC SURGERY

## 2021-04-15 PROCEDURE — 99900035 HC TECH TIME PER 15 MIN (STAT)

## 2021-04-15 RX ORDER — HALOPERIDOL 5 MG/ML
0.5 INJECTION INTRAMUSCULAR EVERY 10 MIN PRN
Status: DISCONTINUED | OUTPATIENT
Start: 2021-04-15 | End: 2021-04-15 | Stop reason: HOSPADM

## 2021-04-15 RX ORDER — EPINEPHRINE 1 MG/ML
INJECTION, SOLUTION INTRACARDIAC; INTRAMUSCULAR; INTRAVENOUS; SUBCUTANEOUS
Status: DISCONTINUED | OUTPATIENT
Start: 2021-04-15 | End: 2021-04-15 | Stop reason: HOSPADM

## 2021-04-15 RX ORDER — MORPHINE SULFATE 2 MG/ML
2 INJECTION, SOLUTION INTRAMUSCULAR; INTRAVENOUS EVERY 10 MIN PRN
Status: DISCONTINUED | OUTPATIENT
Start: 2021-04-15 | End: 2021-04-15 | Stop reason: HOSPADM

## 2021-04-15 RX ORDER — DEXAMETHASONE SODIUM PHOSPHATE 4 MG/ML
INJECTION, SOLUTION INTRA-ARTICULAR; INTRALESIONAL; INTRAMUSCULAR; INTRAVENOUS; SOFT TISSUE
Status: DISCONTINUED | OUTPATIENT
Start: 2021-04-15 | End: 2021-04-15

## 2021-04-15 RX ORDER — ROPIVACAINE HYDROCHLORIDE 5 MG/ML
INJECTION, SOLUTION EPIDURAL; INFILTRATION; PERINEURAL
Status: DISCONTINUED | OUTPATIENT
Start: 2021-04-15 | End: 2021-04-15 | Stop reason: HOSPADM

## 2021-04-15 RX ORDER — FAMOTIDINE 10 MG/ML
INJECTION INTRAVENOUS
Status: DISCONTINUED | OUTPATIENT
Start: 2021-04-15 | End: 2021-04-15

## 2021-04-15 RX ORDER — PROMETHAZINE HYDROCHLORIDE 25 MG/1
25 TABLET ORAL EVERY 6 HOURS PRN
Status: DISCONTINUED | OUTPATIENT
Start: 2021-04-15 | End: 2021-04-15 | Stop reason: HOSPADM

## 2021-04-15 RX ORDER — OXYCODONE AND ACETAMINOPHEN 5; 325 MG/1; MG/1
1 TABLET ORAL
Status: DISCONTINUED | OUTPATIENT
Start: 2021-04-15 | End: 2021-04-15 | Stop reason: HOSPADM

## 2021-04-15 RX ORDER — FENTANYL CITRATE 50 UG/ML
INJECTION, SOLUTION INTRAMUSCULAR; INTRAVENOUS
Status: DISCONTINUED | OUTPATIENT
Start: 2021-04-15 | End: 2021-04-15

## 2021-04-15 RX ORDER — ONDANSETRON 2 MG/ML
4 INJECTION INTRAMUSCULAR; INTRAVENOUS EVERY 12 HOURS PRN
Status: DISCONTINUED | OUTPATIENT
Start: 2021-04-15 | End: 2021-04-15 | Stop reason: HOSPADM

## 2021-04-15 RX ORDER — HYDROMORPHONE HYDROCHLORIDE 1 MG/ML
0.2 INJECTION, SOLUTION INTRAMUSCULAR; INTRAVENOUS; SUBCUTANEOUS EVERY 5 MIN PRN
Status: COMPLETED | OUTPATIENT
Start: 2021-04-15 | End: 2021-04-15

## 2021-04-15 RX ORDER — KETOROLAC TROMETHAMINE 30 MG/ML
INJECTION, SOLUTION INTRAMUSCULAR; INTRAVENOUS
Status: DISCONTINUED | OUTPATIENT
Start: 2021-04-15 | End: 2021-04-15 | Stop reason: HOSPADM

## 2021-04-15 RX ORDER — CEFAZOLIN SODIUM 1 G/3ML
2 INJECTION, POWDER, FOR SOLUTION INTRAMUSCULAR; INTRAVENOUS
Status: COMPLETED | OUTPATIENT
Start: 2021-04-15 | End: 2021-04-15

## 2021-04-15 RX ORDER — KETOROLAC TROMETHAMINE 30 MG/ML
15 INJECTION, SOLUTION INTRAMUSCULAR; INTRAVENOUS EVERY 8 HOURS PRN
Status: DISCONTINUED | OUTPATIENT
Start: 2021-04-15 | End: 2021-04-15 | Stop reason: HOSPADM

## 2021-04-15 RX ORDER — TRAMADOL HYDROCHLORIDE 50 MG/1
100 TABLET ORAL EVERY 6 HOURS PRN
Status: DISCONTINUED | OUTPATIENT
Start: 2021-04-15 | End: 2021-04-15 | Stop reason: HOSPADM

## 2021-04-15 RX ORDER — KETAMINE HYDROCHLORIDE 50 MG/ML
INJECTION, SOLUTION INTRAMUSCULAR; INTRAVENOUS
Status: DISCONTINUED | OUTPATIENT
Start: 2021-04-15 | End: 2021-04-15 | Stop reason: HOSPADM

## 2021-04-15 RX ORDER — LIDOCAINE HYDROCHLORIDE 20 MG/ML
INJECTION, SOLUTION EPIDURAL; INFILTRATION; INTRACAUDAL; PERINEURAL
Status: DISCONTINUED | OUTPATIENT
Start: 2021-04-15 | End: 2021-04-15

## 2021-04-15 RX ORDER — MIDAZOLAM HYDROCHLORIDE 1 MG/ML
INJECTION, SOLUTION INTRAMUSCULAR; INTRAVENOUS
Status: DISCONTINUED | OUTPATIENT
Start: 2021-04-15 | End: 2021-04-15

## 2021-04-15 RX ORDER — OXYCODONE HYDROCHLORIDE 5 MG/1
10 TABLET ORAL EVERY 4 HOURS PRN
Status: DISCONTINUED | OUTPATIENT
Start: 2021-04-15 | End: 2021-04-15 | Stop reason: HOSPADM

## 2021-04-15 RX ORDER — PROPOFOL 10 MG/ML
VIAL (ML) INTRAVENOUS
Status: DISCONTINUED | OUTPATIENT
Start: 2021-04-15 | End: 2021-04-15

## 2021-04-15 RX ORDER — SODIUM CHLORIDE 9 MG/ML
INJECTION, SOLUTION INTRAVENOUS CONTINUOUS
Status: DISCONTINUED | OUTPATIENT
Start: 2021-04-15 | End: 2021-04-15 | Stop reason: HOSPADM

## 2021-04-15 RX ORDER — PREGABALIN 75 MG/1
75 CAPSULE ORAL ONCE
Status: COMPLETED | OUTPATIENT
Start: 2021-04-15 | End: 2021-04-15

## 2021-04-15 RX ORDER — ONDANSETRON 2 MG/ML
INJECTION INTRAMUSCULAR; INTRAVENOUS
Status: DISCONTINUED | OUTPATIENT
Start: 2021-04-15 | End: 2021-04-15

## 2021-04-15 RX ORDER — KETAMINE HCL IN 0.9 % NACL 50 MG/5 ML
SYRINGE (ML) INTRAVENOUS
Status: DISCONTINUED | OUTPATIENT
Start: 2021-04-15 | End: 2021-04-15

## 2021-04-15 RX ADMIN — SODIUM CHLORIDE: 0.9 INJECTION, SOLUTION INTRAVENOUS at 10:04

## 2021-04-15 RX ADMIN — Medication 30 MG: at 11:04

## 2021-04-15 RX ADMIN — HYDROMORPHONE HYDROCHLORIDE 0.2 MG: 1 INJECTION, SOLUTION INTRAMUSCULAR; INTRAVENOUS; SUBCUTANEOUS at 02:04

## 2021-04-15 RX ADMIN — ONDANSETRON 4 MG: 2 INJECTION, SOLUTION INTRAMUSCULAR; INTRAVENOUS at 12:04

## 2021-04-15 RX ADMIN — HYDROMORPHONE HYDROCHLORIDE 0.2 MG: 1 INJECTION, SOLUTION INTRAMUSCULAR; INTRAVENOUS; SUBCUTANEOUS at 01:04

## 2021-04-15 RX ADMIN — OXYCODONE HYDROCHLORIDE AND ACETAMINOPHEN 1 TABLET: 5; 325 TABLET ORAL at 01:04

## 2021-04-15 RX ADMIN — DEXAMETHASONE SODIUM PHOSPHATE 8 MG: 4 INJECTION INTRA-ARTICULAR; INTRALESIONAL; INTRAMUSCULAR; INTRAVENOUS; SOFT TISSUE at 11:04

## 2021-04-15 RX ADMIN — PROPOFOL 200 MG: 10 INJECTION, EMULSION INTRAVENOUS at 11:04

## 2021-04-15 RX ADMIN — PREGABALIN 75 MG: 75 CAPSULE ORAL at 01:04

## 2021-04-15 RX ADMIN — FAMOTIDINE 20 MG: 10 INJECTION INTRAVENOUS at 11:04

## 2021-04-15 RX ADMIN — MIDAZOLAM HYDROCHLORIDE 2 MG: 1 INJECTION, SOLUTION INTRAMUSCULAR; INTRAVENOUS at 11:04

## 2021-04-15 RX ADMIN — FENTANYL CITRATE 100 MCG: 50 INJECTION INTRAMUSCULAR; INTRAVENOUS at 11:04

## 2021-04-15 RX ADMIN — LIDOCAINE HYDROCHLORIDE 100 MG: 20 INJECTION, SOLUTION EPIDURAL; INFILTRATION; INTRACAUDAL at 11:04

## 2021-04-15 RX ADMIN — TRAMADOL HYDROCHLORIDE 100 MG: 50 TABLET, FILM COATED ORAL at 01:04

## 2021-04-15 RX ADMIN — CEFAZOLIN 2 G: 330 INJECTION, POWDER, FOR SOLUTION INTRAMUSCULAR; INTRAVENOUS at 11:04

## 2021-04-16 ENCOUNTER — CLINICAL SUPPORT (OUTPATIENT)
Dept: REHABILITATION | Facility: OTHER | Age: 46
End: 2021-04-16
Attending: PHYSICIAN ASSISTANT
Payer: MEDICARE

## 2021-04-16 DIAGNOSIS — M67.50 PLICA SYNDROME: ICD-10-CM

## 2021-04-16 DIAGNOSIS — R29.898 WEAKNESS OF BOTH LOWER EXTREMITIES: ICD-10-CM

## 2021-04-16 DIAGNOSIS — M23.200 OLD TEAR OF LATERAL MENISCUS OF RIGHT KNEE, UNSPECIFIED TEAR TYPE: ICD-10-CM

## 2021-04-16 DIAGNOSIS — Z74.09 IMPAIRED FUNCTIONAL MOBILITY AND ACTIVITY TOLERANCE: ICD-10-CM

## 2021-04-16 DIAGNOSIS — R26.89 IMPAIRED GAIT AND MOBILITY: ICD-10-CM

## 2021-04-16 DIAGNOSIS — M25.661 DECREASED ROM OF RIGHT KNEE: ICD-10-CM

## 2021-04-16 PROCEDURE — 97161 PT EVAL LOW COMPLEX 20 MIN: CPT

## 2021-04-16 PROCEDURE — 97110 THERAPEUTIC EXERCISES: CPT

## 2021-04-20 ENCOUNTER — CLINICAL SUPPORT (OUTPATIENT)
Dept: REHABILITATION | Facility: OTHER | Age: 46
End: 2021-04-20
Attending: PHYSICIAN ASSISTANT
Payer: MEDICARE

## 2021-04-20 DIAGNOSIS — R26.89 IMPAIRED GAIT AND MOBILITY: ICD-10-CM

## 2021-04-20 DIAGNOSIS — R29.898 WEAKNESS OF BOTH LOWER EXTREMITIES: ICD-10-CM

## 2021-04-20 DIAGNOSIS — M25.661 DECREASED ROM OF RIGHT KNEE: Primary | ICD-10-CM

## 2021-04-20 DIAGNOSIS — Z74.09 IMPAIRED FUNCTIONAL MOBILITY AND ACTIVITY TOLERANCE: ICD-10-CM

## 2021-04-20 PROCEDURE — 97110 THERAPEUTIC EXERCISES: CPT | Mod: CQ

## 2021-04-30 ENCOUNTER — OFFICE VISIT (OUTPATIENT)
Dept: SPORTS MEDICINE | Facility: CLINIC | Age: 46
End: 2021-04-30
Payer: MEDICARE

## 2021-04-30 VITALS
WEIGHT: 198 LBS | DIASTOLIC BLOOD PRESSURE: 80 MMHG | SYSTOLIC BLOOD PRESSURE: 127 MMHG | BODY MASS INDEX: 31.82 KG/M2 | HEART RATE: 62 BPM | HEIGHT: 66 IN

## 2021-04-30 DIAGNOSIS — Z98.890 S/P ARTHROSCOPIC SURGERY OF RIGHT KNEE: Primary | ICD-10-CM

## 2021-04-30 DIAGNOSIS — G89.18 POST-OP PAIN: ICD-10-CM

## 2021-04-30 PROCEDURE — 99999 PR PBB SHADOW E&M-EST. PATIENT-LVL IV: CPT | Mod: PBBFAC,,, | Performed by: PHYSICIAN ASSISTANT

## 2021-04-30 PROCEDURE — 99024 POSTOP FOLLOW-UP VISIT: CPT | Mod: S$GLB,,, | Performed by: PHYSICIAN ASSISTANT

## 2021-04-30 PROCEDURE — 3008F BODY MASS INDEX DOCD: CPT | Mod: CPTII,S$GLB,, | Performed by: PHYSICIAN ASSISTANT

## 2021-04-30 PROCEDURE — 99999 PR PBB SHADOW E&M-EST. PATIENT-LVL IV: ICD-10-PCS | Mod: PBBFAC,,, | Performed by: PHYSICIAN ASSISTANT

## 2021-04-30 PROCEDURE — 1125F PR PAIN SEVERITY QUANTIFIED, PAIN PRESENT: ICD-10-PCS | Mod: S$GLB,,, | Performed by: PHYSICIAN ASSISTANT

## 2021-04-30 PROCEDURE — 1125F AMNT PAIN NOTED PAIN PRSNT: CPT | Mod: S$GLB,,, | Performed by: PHYSICIAN ASSISTANT

## 2021-04-30 PROCEDURE — 3008F PR BODY MASS INDEX (BMI) DOCUMENTED: ICD-10-PCS | Mod: CPTII,S$GLB,, | Performed by: PHYSICIAN ASSISTANT

## 2021-04-30 PROCEDURE — 99024 PR POST-OP FOLLOW-UP VISIT: ICD-10-PCS | Mod: S$GLB,,, | Performed by: PHYSICIAN ASSISTANT

## 2021-04-30 RX ORDER — TRAMADOL HYDROCHLORIDE 50 MG/1
50-100 TABLET ORAL EVERY 6 HOURS PRN
Qty: 21 TABLET | Refills: 0 | Status: SHIPPED | OUTPATIENT
Start: 2021-04-30 | End: 2021-07-21

## 2021-04-30 RX ORDER — HYDROCODONE BITARTRATE AND ACETAMINOPHEN 5; 325 MG/1; MG/1
1 TABLET ORAL EVERY 12 HOURS PRN
Qty: 14 TABLET | Refills: 0 | Status: SHIPPED | OUTPATIENT
Start: 2021-04-30 | End: 2022-01-20

## 2021-05-04 ENCOUNTER — CLINICAL SUPPORT (OUTPATIENT)
Dept: REHABILITATION | Facility: OTHER | Age: 46
End: 2021-05-04
Attending: PHYSICIAN ASSISTANT
Payer: MEDICARE

## 2021-05-04 DIAGNOSIS — M25.661 DECREASED ROM OF RIGHT KNEE: Primary | ICD-10-CM

## 2021-05-04 DIAGNOSIS — Z74.09 IMPAIRED FUNCTIONAL MOBILITY AND ACTIVITY TOLERANCE: ICD-10-CM

## 2021-05-04 DIAGNOSIS — R29.898 WEAKNESS OF BOTH LOWER EXTREMITIES: ICD-10-CM

## 2021-05-04 DIAGNOSIS — R26.89 IMPAIRED GAIT AND MOBILITY: ICD-10-CM

## 2021-05-04 PROCEDURE — 97110 THERAPEUTIC EXERCISES: CPT | Mod: CQ

## 2021-05-11 ENCOUNTER — TELEPHONE (OUTPATIENT)
Dept: SPORTS MEDICINE | Facility: CLINIC | Age: 46
End: 2021-05-11

## 2021-05-11 ENCOUNTER — CLINICAL SUPPORT (OUTPATIENT)
Dept: REHABILITATION | Facility: OTHER | Age: 46
End: 2021-05-11
Attending: PHYSICIAN ASSISTANT
Payer: MEDICARE

## 2021-05-11 DIAGNOSIS — R29.898 WEAKNESS OF BOTH LOWER EXTREMITIES: ICD-10-CM

## 2021-05-11 DIAGNOSIS — M25.661 DECREASED ROM OF RIGHT KNEE: Primary | ICD-10-CM

## 2021-05-11 DIAGNOSIS — Z74.09 IMPAIRED FUNCTIONAL MOBILITY AND ACTIVITY TOLERANCE: ICD-10-CM

## 2021-05-11 DIAGNOSIS — R26.89 IMPAIRED GAIT AND MOBILITY: ICD-10-CM

## 2021-05-11 PROCEDURE — 97110 THERAPEUTIC EXERCISES: CPT | Mod: CQ

## 2021-05-13 ENCOUNTER — CLINICAL SUPPORT (OUTPATIENT)
Dept: REHABILITATION | Facility: OTHER | Age: 46
End: 2021-05-13
Attending: PHYSICIAN ASSISTANT
Payer: MEDICARE

## 2021-05-13 DIAGNOSIS — Z74.09 IMPAIRED FUNCTIONAL MOBILITY AND ACTIVITY TOLERANCE: ICD-10-CM

## 2021-05-13 DIAGNOSIS — M25.661 DECREASED ROM OF RIGHT KNEE: Primary | ICD-10-CM

## 2021-05-13 DIAGNOSIS — R26.89 IMPAIRED GAIT AND MOBILITY: ICD-10-CM

## 2021-05-13 DIAGNOSIS — R29.898 WEAKNESS OF BOTH LOWER EXTREMITIES: ICD-10-CM

## 2021-05-13 PROCEDURE — 97110 THERAPEUTIC EXERCISES: CPT | Mod: CQ

## 2021-05-17 ENCOUNTER — OFFICE VISIT (OUTPATIENT)
Dept: SPORTS MEDICINE | Facility: CLINIC | Age: 46
End: 2021-05-17
Payer: MEDICARE

## 2021-05-17 VITALS
DIASTOLIC BLOOD PRESSURE: 84 MMHG | WEIGHT: 198 LBS | SYSTOLIC BLOOD PRESSURE: 124 MMHG | HEART RATE: 60 BPM | BODY MASS INDEX: 31.82 KG/M2 | HEIGHT: 66 IN

## 2021-05-17 DIAGNOSIS — M79.661 PAIN AND SWELLING OF LOWER LEG, RIGHT: ICD-10-CM

## 2021-05-17 DIAGNOSIS — M79.89 PAIN AND SWELLING OF LOWER LEG, RIGHT: ICD-10-CM

## 2021-05-17 DIAGNOSIS — Z98.890 S/P ARTHROSCOPIC SURGERY OF RIGHT KNEE: Primary | ICD-10-CM

## 2021-05-17 PROCEDURE — 99999 PR PBB SHADOW E&M-EST. PATIENT-LVL IV: CPT | Mod: PBBFAC,,, | Performed by: PHYSICIAN ASSISTANT

## 2021-05-17 PROCEDURE — 99024 PR POST-OP FOLLOW-UP VISIT: ICD-10-PCS | Mod: S$GLB,,, | Performed by: PHYSICIAN ASSISTANT

## 2021-05-17 PROCEDURE — 3008F BODY MASS INDEX DOCD: CPT | Mod: CPTII,S$GLB,, | Performed by: PHYSICIAN ASSISTANT

## 2021-05-17 PROCEDURE — 3008F PR BODY MASS INDEX (BMI) DOCUMENTED: ICD-10-PCS | Mod: CPTII,S$GLB,, | Performed by: PHYSICIAN ASSISTANT

## 2021-05-17 PROCEDURE — 1126F AMNT PAIN NOTED NONE PRSNT: CPT | Mod: S$GLB,,, | Performed by: PHYSICIAN ASSISTANT

## 2021-05-17 PROCEDURE — 1126F PR PAIN SEVERITY QUANTIFIED, NO PAIN PRESENT: ICD-10-PCS | Mod: S$GLB,,, | Performed by: PHYSICIAN ASSISTANT

## 2021-05-17 PROCEDURE — 99024 POSTOP FOLLOW-UP VISIT: CPT | Mod: S$GLB,,, | Performed by: PHYSICIAN ASSISTANT

## 2021-05-17 PROCEDURE — 99999 PR PBB SHADOW E&M-EST. PATIENT-LVL IV: ICD-10-PCS | Mod: PBBFAC,,, | Performed by: PHYSICIAN ASSISTANT

## 2021-05-18 ENCOUNTER — HOSPITAL ENCOUNTER (OUTPATIENT)
Dept: CARDIOLOGY | Facility: HOSPITAL | Age: 46
Discharge: HOME | End: 2021-05-18
Attending: PHYSICIAN ASSISTANT
Payer: MEDICARE

## 2021-05-18 ENCOUNTER — TELEPHONE (OUTPATIENT)
Dept: SPORTS MEDICINE | Facility: CLINIC | Age: 46
End: 2021-05-18

## 2021-05-18 DIAGNOSIS — M79.89 PAIN AND SWELLING OF LOWER LEG, RIGHT: ICD-10-CM

## 2021-05-18 DIAGNOSIS — M79.661 PAIN AND SWELLING OF LOWER LEG, RIGHT: ICD-10-CM

## 2021-05-18 PROCEDURE — 93971 EXTREMITY STUDY: CPT | Mod: RT

## 2021-05-18 PROCEDURE — 93971 EXTREMITY STUDY: CPT | Mod: 26,RT,, | Performed by: INTERNAL MEDICINE

## 2021-05-18 PROCEDURE — 93971 CV US DOPPLER VENOUS LEG RIGHT (CUPID ONLY): ICD-10-PCS | Mod: 26,RT,, | Performed by: INTERNAL MEDICINE

## 2021-05-19 ENCOUNTER — CLINICAL SUPPORT (OUTPATIENT)
Dept: REHABILITATION | Facility: OTHER | Age: 46
End: 2021-05-19
Attending: PHYSICIAN ASSISTANT
Payer: MEDICARE

## 2021-05-19 DIAGNOSIS — R26.89 IMPAIRED GAIT AND MOBILITY: ICD-10-CM

## 2021-05-19 DIAGNOSIS — Z74.09 IMPAIRED FUNCTIONAL MOBILITY AND ACTIVITY TOLERANCE: ICD-10-CM

## 2021-05-19 DIAGNOSIS — M25.661 DECREASED ROM OF RIGHT KNEE: Primary | ICD-10-CM

## 2021-05-19 DIAGNOSIS — R29.898 WEAKNESS OF BOTH LOWER EXTREMITIES: ICD-10-CM

## 2021-05-19 PROCEDURE — 97110 THERAPEUTIC EXERCISES: CPT | Mod: CQ

## 2021-05-21 ENCOUNTER — CLINICAL SUPPORT (OUTPATIENT)
Dept: REHABILITATION | Facility: OTHER | Age: 46
End: 2021-05-21
Attending: PHYSICIAN ASSISTANT
Payer: MEDICARE

## 2021-05-21 DIAGNOSIS — R29.898 WEAKNESS OF BOTH LOWER EXTREMITIES: ICD-10-CM

## 2021-05-21 DIAGNOSIS — Z74.09 IMPAIRED FUNCTIONAL MOBILITY AND ACTIVITY TOLERANCE: ICD-10-CM

## 2021-05-21 DIAGNOSIS — M25.661 DECREASED ROM OF RIGHT KNEE: Primary | ICD-10-CM

## 2021-05-21 DIAGNOSIS — R26.89 IMPAIRED GAIT AND MOBILITY: ICD-10-CM

## 2021-05-21 PROCEDURE — 97530 THERAPEUTIC ACTIVITIES: CPT

## 2021-05-25 ENCOUNTER — CLINICAL SUPPORT (OUTPATIENT)
Dept: REHABILITATION | Facility: OTHER | Age: 46
End: 2021-05-25
Attending: PHYSICIAN ASSISTANT
Payer: MEDICARE

## 2021-05-25 DIAGNOSIS — R29.898 WEAKNESS OF BOTH LOWER EXTREMITIES: ICD-10-CM

## 2021-05-25 DIAGNOSIS — Z74.09 IMPAIRED FUNCTIONAL MOBILITY AND ACTIVITY TOLERANCE: ICD-10-CM

## 2021-05-25 DIAGNOSIS — R26.89 IMPAIRED GAIT AND MOBILITY: ICD-10-CM

## 2021-05-25 DIAGNOSIS — M25.661 DECREASED ROM OF RIGHT KNEE: Primary | ICD-10-CM

## 2021-05-25 PROCEDURE — 97110 THERAPEUTIC EXERCISES: CPT

## 2021-06-01 ENCOUNTER — CLINICAL SUPPORT (OUTPATIENT)
Dept: REHABILITATION | Facility: OTHER | Age: 46
End: 2021-06-01
Attending: PHYSICIAN ASSISTANT
Payer: MEDICARE

## 2021-06-01 DIAGNOSIS — M25.661 DECREASED ROM OF RIGHT KNEE: Primary | ICD-10-CM

## 2021-06-01 DIAGNOSIS — R26.89 IMPAIRED GAIT AND MOBILITY: ICD-10-CM

## 2021-06-01 DIAGNOSIS — Z74.09 IMPAIRED FUNCTIONAL MOBILITY AND ACTIVITY TOLERANCE: ICD-10-CM

## 2021-06-01 DIAGNOSIS — R29.898 WEAKNESS OF BOTH LOWER EXTREMITIES: ICD-10-CM

## 2021-06-01 PROCEDURE — 97110 THERAPEUTIC EXERCISES: CPT

## 2021-06-08 ENCOUNTER — DOCUMENTATION ONLY (OUTPATIENT)
Dept: REHABILITATION | Facility: OTHER | Age: 46
End: 2021-06-08

## 2021-06-09 ENCOUNTER — CLINICAL SUPPORT (OUTPATIENT)
Dept: REHABILITATION | Facility: OTHER | Age: 46
End: 2021-06-09
Attending: PHYSICIAN ASSISTANT
Payer: MEDICARE

## 2021-06-09 DIAGNOSIS — R29.898 WEAKNESS OF BOTH LOWER EXTREMITIES: ICD-10-CM

## 2021-06-09 DIAGNOSIS — Z74.09 IMPAIRED FUNCTIONAL MOBILITY AND ACTIVITY TOLERANCE: ICD-10-CM

## 2021-06-09 DIAGNOSIS — R26.89 IMPAIRED GAIT AND MOBILITY: ICD-10-CM

## 2021-06-09 DIAGNOSIS — M25.661 DECREASED ROM OF RIGHT KNEE: Primary | ICD-10-CM

## 2021-06-09 PROCEDURE — 97110 THERAPEUTIC EXERCISES: CPT

## 2021-06-09 PROCEDURE — 97530 THERAPEUTIC ACTIVITIES: CPT

## 2021-06-11 ENCOUNTER — CLINICAL SUPPORT (OUTPATIENT)
Dept: REHABILITATION | Facility: OTHER | Age: 46
End: 2021-06-11
Attending: PHYSICIAN ASSISTANT
Payer: MEDICARE

## 2021-06-11 DIAGNOSIS — M25.661 DECREASED ROM OF RIGHT KNEE: Primary | ICD-10-CM

## 2021-06-11 DIAGNOSIS — R26.89 IMPAIRED GAIT AND MOBILITY: ICD-10-CM

## 2021-06-11 DIAGNOSIS — Z74.09 IMPAIRED FUNCTIONAL MOBILITY AND ACTIVITY TOLERANCE: ICD-10-CM

## 2021-06-11 DIAGNOSIS — R29.898 WEAKNESS OF BOTH LOWER EXTREMITIES: ICD-10-CM

## 2021-06-11 PROCEDURE — 97110 THERAPEUTIC EXERCISES: CPT

## 2021-06-15 ENCOUNTER — CLINICAL SUPPORT (OUTPATIENT)
Dept: REHABILITATION | Facility: OTHER | Age: 46
End: 2021-06-15
Attending: PHYSICIAN ASSISTANT
Payer: MEDICARE

## 2021-06-15 DIAGNOSIS — Z74.09 IMPAIRED FUNCTIONAL MOBILITY AND ACTIVITY TOLERANCE: ICD-10-CM

## 2021-06-15 DIAGNOSIS — R29.898 WEAKNESS OF BOTH LOWER EXTREMITIES: ICD-10-CM

## 2021-06-15 DIAGNOSIS — M25.661 DECREASED ROM OF RIGHT KNEE: Primary | ICD-10-CM

## 2021-06-15 DIAGNOSIS — R26.89 IMPAIRED GAIT AND MOBILITY: ICD-10-CM

## 2021-06-15 PROCEDURE — 97110 THERAPEUTIC EXERCISES: CPT

## 2021-06-16 ENCOUNTER — OFFICE VISIT (OUTPATIENT)
Dept: SPORTS MEDICINE | Facility: CLINIC | Age: 46
End: 2021-06-16
Payer: MEDICARE

## 2021-06-16 VITALS
BODY MASS INDEX: 32.62 KG/M2 | WEIGHT: 203 LBS | HEART RATE: 84 BPM | DIASTOLIC BLOOD PRESSURE: 78 MMHG | SYSTOLIC BLOOD PRESSURE: 132 MMHG | HEIGHT: 66 IN

## 2021-06-16 DIAGNOSIS — M25.561 CHRONIC PAIN OF RIGHT KNEE: Primary | ICD-10-CM

## 2021-06-16 DIAGNOSIS — G89.29 CHRONIC PAIN OF RIGHT KNEE: Primary | ICD-10-CM

## 2021-06-16 PROCEDURE — 99999 PR PBB SHADOW E&M-EST. PATIENT-LVL V: CPT | Mod: PBBFAC,,, | Performed by: ORTHOPAEDIC SURGERY

## 2021-06-16 PROCEDURE — 99999 PR PBB SHADOW E&M-EST. PATIENT-LVL V: ICD-10-PCS | Mod: PBBFAC,,, | Performed by: ORTHOPAEDIC SURGERY

## 2021-06-16 PROCEDURE — 1126F PR PAIN SEVERITY QUANTIFIED, NO PAIN PRESENT: ICD-10-PCS | Mod: S$GLB,,, | Performed by: ORTHOPAEDIC SURGERY

## 2021-06-16 PROCEDURE — 99024 PR POST-OP FOLLOW-UP VISIT: ICD-10-PCS | Mod: S$GLB,,, | Performed by: ORTHOPAEDIC SURGERY

## 2021-06-16 PROCEDURE — 3008F BODY MASS INDEX DOCD: CPT | Mod: CPTII,S$GLB,, | Performed by: ORTHOPAEDIC SURGERY

## 2021-06-16 PROCEDURE — 1126F AMNT PAIN NOTED NONE PRSNT: CPT | Mod: S$GLB,,, | Performed by: ORTHOPAEDIC SURGERY

## 2021-06-16 PROCEDURE — 99024 POSTOP FOLLOW-UP VISIT: CPT | Mod: S$GLB,,, | Performed by: ORTHOPAEDIC SURGERY

## 2021-06-16 PROCEDURE — 3008F PR BODY MASS INDEX (BMI) DOCUMENTED: ICD-10-PCS | Mod: CPTII,S$GLB,, | Performed by: ORTHOPAEDIC SURGERY

## 2021-06-18 ENCOUNTER — CLINICAL SUPPORT (OUTPATIENT)
Dept: REHABILITATION | Facility: OTHER | Age: 46
End: 2021-06-18
Attending: PHYSICIAN ASSISTANT
Payer: MEDICARE

## 2021-06-18 DIAGNOSIS — R29.898 WEAKNESS OF BOTH LOWER EXTREMITIES: ICD-10-CM

## 2021-06-18 DIAGNOSIS — Z74.09 IMPAIRED FUNCTIONAL MOBILITY AND ACTIVITY TOLERANCE: ICD-10-CM

## 2021-06-18 DIAGNOSIS — R26.89 IMPAIRED GAIT AND MOBILITY: ICD-10-CM

## 2021-06-18 DIAGNOSIS — M25.661 DECREASED ROM OF RIGHT KNEE: Primary | ICD-10-CM

## 2021-06-18 PROCEDURE — 97110 THERAPEUTIC EXERCISES: CPT | Mod: CQ

## 2021-06-24 ENCOUNTER — CLINICAL SUPPORT (OUTPATIENT)
Dept: REHABILITATION | Facility: OTHER | Age: 46
End: 2021-06-24
Attending: PHYSICIAN ASSISTANT
Payer: MEDICARE

## 2021-06-24 DIAGNOSIS — M25.661 DECREASED ROM OF RIGHT KNEE: Primary | ICD-10-CM

## 2021-06-24 DIAGNOSIS — Z74.09 IMPAIRED FUNCTIONAL MOBILITY AND ACTIVITY TOLERANCE: ICD-10-CM

## 2021-06-24 DIAGNOSIS — R29.898 WEAKNESS OF BOTH LOWER EXTREMITIES: ICD-10-CM

## 2021-06-24 DIAGNOSIS — R26.89 IMPAIRED GAIT AND MOBILITY: ICD-10-CM

## 2021-06-24 PROCEDURE — 97110 THERAPEUTIC EXERCISES: CPT

## 2021-06-24 PROCEDURE — 97530 THERAPEUTIC ACTIVITIES: CPT
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
## 2021-06-30 ENCOUNTER — CLINICAL SUPPORT (OUTPATIENT)
Dept: REHABILITATION | Facility: OTHER | Age: 46
End: 2021-06-30
Attending: PHYSICIAN ASSISTANT
Payer: MEDICARE

## 2021-06-30 DIAGNOSIS — R26.89 IMPAIRED GAIT AND MOBILITY: ICD-10-CM

## 2021-06-30 DIAGNOSIS — M25.661 DECREASED ROM OF RIGHT KNEE: Primary | ICD-10-CM

## 2021-06-30 DIAGNOSIS — Z74.09 IMPAIRED FUNCTIONAL MOBILITY AND ACTIVITY TOLERANCE: ICD-10-CM

## 2021-06-30 DIAGNOSIS — R29.898 WEAKNESS OF BOTH LOWER EXTREMITIES: ICD-10-CM

## 2021-06-30 PROCEDURE — 97110 THERAPEUTIC EXERCISES: CPT | Mod: CQ

## 2021-07-02 ENCOUNTER — CLINICAL SUPPORT (OUTPATIENT)
Dept: REHABILITATION | Facility: OTHER | Age: 46
End: 2021-07-02
Attending: PHYSICIAN ASSISTANT
Payer: MEDICARE

## 2021-07-02 DIAGNOSIS — M25.661 DECREASED ROM OF RIGHT KNEE: Primary | ICD-10-CM

## 2021-07-02 DIAGNOSIS — R29.898 WEAKNESS OF BOTH LOWER EXTREMITIES: ICD-10-CM

## 2021-07-02 DIAGNOSIS — R26.89 IMPAIRED GAIT AND MOBILITY: ICD-10-CM

## 2021-07-02 DIAGNOSIS — Z74.09 IMPAIRED FUNCTIONAL MOBILITY AND ACTIVITY TOLERANCE: ICD-10-CM

## 2021-07-02 PROCEDURE — 97110 THERAPEUTIC EXERCISES: CPT

## 2021-07-21 ENCOUNTER — TELEPHONE (OUTPATIENT)
Dept: SPORTS MEDICINE | Facility: CLINIC | Age: 46
End: 2021-07-21

## 2022-01-19 ENCOUNTER — HOSPITAL ENCOUNTER (INPATIENT)
Facility: HOSPITAL | Age: 47
LOS: 7 days | Discharge: HOME HEALTH | DRG: 863 | End: 2022-01-28
Attending: EMERGENCY MEDICINE | Admitting: HOSPITALIST
Payer: MEDICARE

## 2022-01-19 DIAGNOSIS — S71.101D OPEN WOUND OF RIGHT THIGH, SUBSEQUENT ENCOUNTER: ICD-10-CM

## 2022-01-19 DIAGNOSIS — S71.131D GUNSHOT WOUND OF RIGHT THIGH/FEMUR, SUBSEQUENT ENCOUNTER: ICD-10-CM

## 2022-01-19 DIAGNOSIS — F33.41 MAJOR DEPRESSIVE DISORDER, RECURRENT EPISODE, IN PARTIAL REMISSION: ICD-10-CM

## 2022-01-19 DIAGNOSIS — T14.8XXA POST-TRAUMATIC WOUND INFECTION: ICD-10-CM

## 2022-01-19 DIAGNOSIS — L08.9 POST-TRAUMATIC WOUND INFECTION: ICD-10-CM

## 2022-01-19 DIAGNOSIS — R50.9 ACUTE FEBRILE ILLNESS: ICD-10-CM

## 2022-01-19 DIAGNOSIS — S71.101A OPEN WOUND OF RIGHT THIGH, INITIAL ENCOUNTER: Primary | ICD-10-CM

## 2022-01-19 LAB
ALBUMIN SERPL BCP-MCNC: 3.2 G/DL (ref 3.5–5.2)
ALP SERPL-CCNC: 70 U/L (ref 55–135)
ALT SERPL W/O P-5'-P-CCNC: 21 U/L (ref 10–44)
ANION GAP SERPL CALC-SCNC: 9 MMOL/L (ref 8–16)
AST SERPL-CCNC: 25 U/L (ref 10–40)
BASOPHILS # BLD AUTO: 0.02 K/UL (ref 0–0.2)
BASOPHILS NFR BLD: 0.2 % (ref 0–1.9)
BILIRUB SERPL-MCNC: 0.6 MG/DL (ref 0.1–1)
BUN SERPL-MCNC: 7 MG/DL (ref 6–30)
BUN SERPL-MCNC: 8 MG/DL (ref 6–20)
CALCIUM SERPL-MCNC: 9.1 MG/DL (ref 8.7–10.5)
CHLORIDE SERPL-SCNC: 98 MMOL/L (ref 95–110)
CHLORIDE SERPL-SCNC: 99 MMOL/L (ref 95–110)
CK SERPL-CCNC: 586 U/L (ref 20–180)
CO2 SERPL-SCNC: 25 MMOL/L (ref 23–29)
CREAT SERPL-MCNC: 0.7 MG/DL (ref 0.5–1.4)
CREAT SERPL-MCNC: 0.8 MG/DL (ref 0.5–1.4)
CRP SERPL-MCNC: 23.3 MG/L (ref 0–8.2)
CTP QC/QA: YES
DIFFERENTIAL METHOD: ABNORMAL
EOSINOPHIL # BLD AUTO: 0.3 K/UL (ref 0–0.5)
EOSINOPHIL NFR BLD: 3.6 % (ref 0–8)
ERYTHROCYTE [DISTWIDTH] IN BLOOD BY AUTOMATED COUNT: 12.6 % (ref 11.5–14.5)
ERYTHROCYTE [SEDIMENTATION RATE] IN BLOOD BY WESTERGREN METHOD: 60 MM/HR (ref 0–36)
EST. GFR  (AFRICAN AMERICAN): >60 ML/MIN/1.73 M^2
EST. GFR  (NON AFRICAN AMERICAN): >60 ML/MIN/1.73 M^2
GLUCOSE SERPL-MCNC: 115 MG/DL (ref 70–110)
GLUCOSE SERPL-MCNC: 119 MG/DL (ref 70–110)
HCT VFR BLD AUTO: 29.6 % (ref 37–48.5)
HCT VFR BLD CALC: 30 %PCV (ref 36–54)
HGB BLD-MCNC: 9.1 G/DL (ref 12–16)
IMM GRANULOCYTES # BLD AUTO: 0.02 K/UL (ref 0–0.04)
IMM GRANULOCYTES NFR BLD AUTO: 0.2 % (ref 0–0.5)
LACTATE SERPL-SCNC: 1.1 MMOL/L (ref 0.5–2.2)
LYMPHOCYTES # BLD AUTO: 1.8 K/UL (ref 1–4.8)
LYMPHOCYTES NFR BLD: 19.3 % (ref 18–48)
MAGNESIUM SERPL-MCNC: 1.8 MG/DL (ref 1.6–2.6)
MCH RBC QN AUTO: 27 PG (ref 27–31)
MCHC RBC AUTO-ENTMCNC: 30.7 G/DL (ref 32–36)
MCV RBC AUTO: 88 FL (ref 82–98)
MONOCYTES # BLD AUTO: 0.7 K/UL (ref 0.3–1)
MONOCYTES NFR BLD: 7.3 % (ref 4–15)
NEUTROPHILS # BLD AUTO: 6.5 K/UL (ref 1.8–7.7)
NEUTROPHILS NFR BLD: 69.4 % (ref 38–73)
NRBC BLD-RTO: 0 /100 WBC
PLATELET # BLD AUTO: 282 K/UL (ref 150–450)
PMV BLD AUTO: 12.6 FL (ref 9.2–12.9)
POC IONIZED CALCIUM: 1.2 MMOL/L (ref 1.06–1.42)
POC TCO2 (MEASURED): 27 MMOL/L (ref 23–29)
POTASSIUM BLD-SCNC: 3.8 MMOL/L (ref 3.5–5.1)
POTASSIUM SERPL-SCNC: 3.8 MMOL/L (ref 3.5–5.1)
PROCALCITONIN SERPL IA-MCNC: 0.02 NG/ML
PROT SERPL-MCNC: 6.9 G/DL (ref 6–8.4)
RBC # BLD AUTO: 3.37 M/UL (ref 4–5.4)
SAMPLE: ABNORMAL
SARS-COV-2 RDRP RESP QL NAA+PROBE: NEGATIVE
SODIUM BLD-SCNC: 136 MMOL/L (ref 136–145)
SODIUM SERPL-SCNC: 133 MMOL/L (ref 136–145)
WBC # BLD AUTO: 9.36 K/UL (ref 3.9–12.7)

## 2022-01-19 PROCEDURE — 83735 ASSAY OF MAGNESIUM: CPT | Performed by: PHYSICIAN ASSISTANT

## 2022-01-19 PROCEDURE — 85025 COMPLETE CBC W/AUTO DIFF WBC: CPT | Performed by: PHYSICIAN ASSISTANT

## 2022-01-19 PROCEDURE — 87040 BLOOD CULTURE FOR BACTERIA: CPT | Mod: 59 | Performed by: PHYSICIAN ASSISTANT

## 2022-01-19 PROCEDURE — 25000003 PHARM REV CODE 250: Performed by: PHYSICIAN ASSISTANT

## 2022-01-19 PROCEDURE — 84145 PROCALCITONIN (PCT): CPT | Performed by: PHYSICIAN ASSISTANT

## 2022-01-19 PROCEDURE — 83605 ASSAY OF LACTIC ACID: CPT | Performed by: PHYSICIAN ASSISTANT

## 2022-01-19 PROCEDURE — 96365 THER/PROPH/DIAG IV INF INIT: CPT

## 2022-01-19 PROCEDURE — 63600175 PHARM REV CODE 636 W HCPCS: Performed by: PHYSICIAN ASSISTANT

## 2022-01-19 PROCEDURE — 99285 PR EMERGENCY DEPT VISIT,LEVEL V: ICD-10-PCS | Mod: CS,,, | Performed by: EMERGENCY MEDICINE

## 2022-01-19 PROCEDURE — 96361 HYDRATE IV INFUSION ADD-ON: CPT

## 2022-01-19 PROCEDURE — 82550 ASSAY OF CK (CPK): CPT | Performed by: PHYSICIAN ASSISTANT

## 2022-01-19 PROCEDURE — 80053 COMPREHEN METABOLIC PANEL: CPT | Performed by: PHYSICIAN ASSISTANT

## 2022-01-19 PROCEDURE — U0002 COVID-19 LAB TEST NON-CDC: HCPCS | Performed by: PHYSICIAN ASSISTANT

## 2022-01-19 PROCEDURE — 96375 TX/PRO/DX INJ NEW DRUG ADDON: CPT

## 2022-01-19 PROCEDURE — 85652 RBC SED RATE AUTOMATED: CPT | Performed by: PHYSICIAN ASSISTANT

## 2022-01-19 PROCEDURE — 99285 EMERGENCY DEPT VISIT HI MDM: CPT | Mod: 25

## 2022-01-19 PROCEDURE — G0378 HOSPITAL OBSERVATION PER HR: HCPCS

## 2022-01-19 PROCEDURE — 25500020 PHARM REV CODE 255: Performed by: EMERGENCY MEDICINE

## 2022-01-19 PROCEDURE — 86140 C-REACTIVE PROTEIN: CPT | Performed by: PHYSICIAN ASSISTANT

## 2022-01-19 PROCEDURE — 99285 EMERGENCY DEPT VISIT HI MDM: CPT | Mod: CS,,, | Performed by: EMERGENCY MEDICINE

## 2022-01-19 RX ORDER — ONDANSETRON 2 MG/ML
4 INJECTION INTRAMUSCULAR; INTRAVENOUS
Status: COMPLETED | OUTPATIENT
Start: 2022-01-19 | End: 2022-01-19

## 2022-01-19 RX ORDER — MORPHINE SULFATE 2 MG/ML
6 INJECTION, SOLUTION INTRAMUSCULAR; INTRAVENOUS
Status: COMPLETED | OUTPATIENT
Start: 2022-01-19 | End: 2022-01-19

## 2022-01-19 RX ADMIN — IOHEXOL 100 ML: 350 INJECTION, SOLUTION INTRAVENOUS at 09:01

## 2022-01-19 RX ADMIN — SODIUM CHLORIDE, SODIUM LACTATE, POTASSIUM CHLORIDE, AND CALCIUM CHLORIDE 1000 ML: .6; .31; .03; .02 INJECTION, SOLUTION INTRAVENOUS at 08:01

## 2022-01-19 RX ADMIN — MORPHINE SULFATE 6 MG: 2 INJECTION, SOLUTION INTRAMUSCULAR; INTRAVENOUS at 08:01

## 2022-01-19 RX ADMIN — VANCOMYCIN HYDROCHLORIDE 1750 MG: 500 INJECTION, POWDER, LYOPHILIZED, FOR SOLUTION INTRAVENOUS at 10:01

## 2022-01-19 RX ADMIN — ONDANSETRON 4 MG: 2 INJECTION INTRAMUSCULAR; INTRAVENOUS at 08:01

## 2022-01-20 PROBLEM — R52 GENERALIZED BODY ACHES: Status: RESOLVED | Noted: 2020-03-24 | Resolved: 2022-01-20

## 2022-01-20 PROBLEM — T14.8XXA POST-TRAUMATIC WOUND INFECTION: Status: ACTIVE | Noted: 2022-01-20

## 2022-01-20 PROBLEM — L08.9 POST-TRAUMATIC WOUND INFECTION: Status: ACTIVE | Noted: 2022-01-20

## 2022-01-20 PROBLEM — J06.9 URI WITH COUGH AND CONGESTION: Status: RESOLVED | Noted: 2020-03-24 | Resolved: 2022-01-20

## 2022-01-20 LAB
ESTIMATED AVG GLUCOSE: 128 MG/DL (ref 68–131)
HBA1C MFR BLD: 6.1 % (ref 4–5.6)
POCT GLUCOSE: 120 MG/DL (ref 70–110)

## 2022-01-20 PROCEDURE — 63600175 PHARM REV CODE 636 W HCPCS: Performed by: HOSPITALIST

## 2022-01-20 PROCEDURE — 25000003 PHARM REV CODE 250: Performed by: HOSPITALIST

## 2022-01-20 PROCEDURE — G0378 HOSPITAL OBSERVATION PER HR: HCPCS

## 2022-01-20 PROCEDURE — 63600175 PHARM REV CODE 636 W HCPCS: Performed by: PHYSICIAN ASSISTANT

## 2022-01-20 PROCEDURE — 99220 PR INITIAL OBSERVATION CARE,LEVL III: ICD-10-PCS | Mod: ,,, | Performed by: HOSPITALIST

## 2022-01-20 PROCEDURE — 99220 PR INITIAL OBSERVATION CARE,LEVL III: CPT | Mod: ,,, | Performed by: HOSPITALIST

## 2022-01-20 PROCEDURE — 25000003 PHARM REV CODE 250: Performed by: PHYSICIAN ASSISTANT

## 2022-01-20 PROCEDURE — 83036 HEMOGLOBIN GLYCOSYLATED A1C: CPT | Performed by: HOSPITALIST

## 2022-01-20 RX ORDER — TIZANIDINE 4 MG/1
4 TABLET ORAL NIGHTLY
Status: ON HOLD | COMMUNITY
Start: 2022-01-10 | End: 2022-01-28 | Stop reason: HOSPADM

## 2022-01-20 RX ORDER — MELOXICAM 15 MG/1
15 TABLET ORAL DAILY
Status: DISCONTINUED | OUTPATIENT
Start: 2022-01-20 | End: 2022-01-28 | Stop reason: HOSPADM

## 2022-01-20 RX ORDER — PREGABALIN 75 MG/1
150 CAPSULE ORAL 2 TIMES DAILY
Status: DISCONTINUED | OUTPATIENT
Start: 2022-01-20 | End: 2022-01-28 | Stop reason: HOSPADM

## 2022-01-20 RX ORDER — HYDROMORPHONE HYDROCHLORIDE 1 MG/ML
1 INJECTION, SOLUTION INTRAMUSCULAR; INTRAVENOUS; SUBCUTANEOUS EVERY 6 HOURS PRN
Status: DISCONTINUED | OUTPATIENT
Start: 2022-01-20 | End: 2022-01-28 | Stop reason: HOSPADM

## 2022-01-20 RX ORDER — TIZANIDINE 4 MG/1
4 TABLET ORAL NIGHTLY
Status: DISCONTINUED | OUTPATIENT
Start: 2022-01-20 | End: 2022-01-28 | Stop reason: HOSPADM

## 2022-01-20 RX ORDER — TALC
6 POWDER (GRAM) TOPICAL NIGHTLY PRN
Status: DISCONTINUED | OUTPATIENT
Start: 2022-01-20 | End: 2022-01-28 | Stop reason: HOSPADM

## 2022-01-20 RX ORDER — GLUCAGON 1 MG
1 KIT INJECTION
Status: DISCONTINUED | OUTPATIENT
Start: 2022-01-20 | End: 2022-01-28 | Stop reason: HOSPADM

## 2022-01-20 RX ORDER — ONDANSETRON 2 MG/ML
4 INJECTION INTRAMUSCULAR; INTRAVENOUS EVERY 8 HOURS PRN
Status: DISCONTINUED | OUTPATIENT
Start: 2022-01-20 | End: 2022-01-28 | Stop reason: HOSPADM

## 2022-01-20 RX ORDER — ACETAMINOPHEN 500 MG
1000 TABLET ORAL EVERY 8 HOURS PRN
Status: DISCONTINUED | OUTPATIENT
Start: 2022-01-20 | End: 2022-01-20

## 2022-01-20 RX ORDER — LOSARTAN POTASSIUM 25 MG/1
25 TABLET ORAL DAILY
COMMUNITY
Start: 2022-01-10

## 2022-01-20 RX ORDER — ATORVASTATIN CALCIUM 20 MG/1
40 TABLET, FILM COATED ORAL DAILY
Status: DISCONTINUED | OUTPATIENT
Start: 2022-01-20 | End: 2022-01-28 | Stop reason: HOSPADM

## 2022-01-20 RX ORDER — TRAMADOL HYDROCHLORIDE 50 MG/1
50 TABLET ORAL DAILY
Status: ON HOLD | COMMUNITY
Start: 2022-01-10 | End: 2022-01-28 | Stop reason: HOSPADM

## 2022-01-20 RX ORDER — PREGABALIN 150 MG/1
150 CAPSULE ORAL 2 TIMES DAILY
COMMUNITY
Start: 2022-01-10

## 2022-01-20 RX ORDER — AMITRIPTYLINE HYDROCHLORIDE 50 MG/1
50 TABLET, FILM COATED ORAL NIGHTLY
COMMUNITY
Start: 2022-01-10

## 2022-01-20 RX ORDER — NALOXONE HCL 0.4 MG/ML
0.02 VIAL (ML) INJECTION
Status: DISCONTINUED | OUTPATIENT
Start: 2022-01-20 | End: 2022-01-28 | Stop reason: HOSPADM

## 2022-01-20 RX ORDER — SODIUM CHLORIDE 0.9 % (FLUSH) 0.9 %
10 SYRINGE (ML) INJECTION EVERY 12 HOURS PRN
Status: DISCONTINUED | OUTPATIENT
Start: 2022-01-20 | End: 2022-01-28 | Stop reason: HOSPADM

## 2022-01-20 RX ORDER — LOSARTAN POTASSIUM 25 MG/1
25 TABLET ORAL DAILY
Status: DISCONTINUED | OUTPATIENT
Start: 2022-01-20 | End: 2022-01-28 | Stop reason: HOSPADM

## 2022-01-20 RX ORDER — OXYCODONE HYDROCHLORIDE 10 MG/1
10 TABLET ORAL EVERY 6 HOURS PRN
Status: DISCONTINUED | OUTPATIENT
Start: 2022-01-20 | End: 2022-01-28 | Stop reason: HOSPADM

## 2022-01-20 RX ORDER — IBUPROFEN 200 MG
24 TABLET ORAL
Status: DISCONTINUED | OUTPATIENT
Start: 2022-01-20 | End: 2022-01-28 | Stop reason: HOSPADM

## 2022-01-20 RX ORDER — OXYCODONE HYDROCHLORIDE 5 MG/1
5 TABLET ORAL EVERY 6 HOURS PRN
Status: DISCONTINUED | OUTPATIENT
Start: 2022-01-20 | End: 2022-01-28 | Stop reason: HOSPADM

## 2022-01-20 RX ORDER — OXYCODONE AND ACETAMINOPHEN 5; 325 MG/1; MG/1
1 TABLET ORAL EVERY 6 HOURS PRN
Status: ON HOLD | COMMUNITY
Start: 2022-01-15 | End: 2022-01-28 | Stop reason: HOSPADM

## 2022-01-20 RX ORDER — CITALOPRAM 20 MG/1
20 TABLET, FILM COATED ORAL DAILY
Status: DISCONTINUED | OUTPATIENT
Start: 2022-01-20 | End: 2022-01-28 | Stop reason: HOSPADM

## 2022-01-20 RX ORDER — AMLODIPINE BESYLATE 5 MG/1
5 TABLET ORAL DAILY
Status: DISCONTINUED | OUTPATIENT
Start: 2022-01-20 | End: 2022-01-28 | Stop reason: HOSPADM

## 2022-01-20 RX ORDER — ASPIRIN 81 MG/1
81 TABLET ORAL DAILY
Status: DISCONTINUED | OUTPATIENT
Start: 2022-01-20 | End: 2022-01-28 | Stop reason: HOSPADM

## 2022-01-20 RX ORDER — ONDANSETRON 8 MG/1
8 TABLET, ORALLY DISINTEGRATING ORAL EVERY 8 HOURS PRN
COMMUNITY
Start: 2022-01-15

## 2022-01-20 RX ORDER — AMOXICILLIN 250 MG
1 CAPSULE ORAL 2 TIMES DAILY
Status: DISCONTINUED | OUTPATIENT
Start: 2022-01-20 | End: 2022-01-28 | Stop reason: HOSPADM

## 2022-01-20 RX ORDER — ALBUTEROL SULFATE 2.5 MG/.5ML
2.5 SOLUTION RESPIRATORY (INHALATION) EVERY 4 HOURS PRN
Status: DISCONTINUED | OUTPATIENT
Start: 2022-01-20 | End: 2022-01-24

## 2022-01-20 RX ORDER — METHOCARBAMOL 500 MG/1
500 TABLET, FILM COATED ORAL 2 TIMES DAILY
COMMUNITY
Start: 2022-01-15

## 2022-01-20 RX ORDER — ENOXAPARIN SODIUM 100 MG/ML
40 INJECTION SUBCUTANEOUS EVERY 24 HOURS
Status: DISCONTINUED | OUTPATIENT
Start: 2022-01-20 | End: 2022-01-28 | Stop reason: HOSPADM

## 2022-01-20 RX ORDER — AMITRIPTYLINE HYDROCHLORIDE 50 MG/1
50 TABLET, FILM COATED ORAL NIGHTLY
Status: DISCONTINUED | OUTPATIENT
Start: 2022-01-20 | End: 2022-01-28 | Stop reason: HOSPADM

## 2022-01-20 RX ORDER — IBUPROFEN 200 MG
16 TABLET ORAL
Status: DISCONTINUED | OUTPATIENT
Start: 2022-01-20 | End: 2022-01-28 | Stop reason: HOSPADM

## 2022-01-20 RX ORDER — INSULIN ASPART 100 [IU]/ML
0-5 INJECTION, SOLUTION INTRAVENOUS; SUBCUTANEOUS
Status: DISCONTINUED | OUTPATIENT
Start: 2022-01-20 | End: 2022-01-28 | Stop reason: HOSPADM

## 2022-01-20 RX ORDER — ACETAMINOPHEN 500 MG
1000 TABLET ORAL 3 TIMES DAILY
Status: DISCONTINUED | OUTPATIENT
Start: 2022-01-20 | End: 2022-01-28 | Stop reason: HOSPADM

## 2022-01-20 RX ADMIN — PREGABALIN 150 MG: 75 CAPSULE ORAL at 09:01

## 2022-01-20 RX ADMIN — DOCUSATE SODIUM 50 MG AND SENNOSIDES 8.6 MG 1 TABLET: 8.6; 5 TABLET, FILM COATED ORAL at 09:01

## 2022-01-20 RX ADMIN — AMITRIPTYLINE HYDROCHLORIDE 50 MG: 50 TABLET, FILM COATED ORAL at 09:01

## 2022-01-20 RX ADMIN — CITALOPRAM HYDROBROMIDE 20 MG: 20 TABLET ORAL at 09:01

## 2022-01-20 RX ADMIN — AMITRIPTYLINE HYDROCHLORIDE 50 MG: 50 TABLET, FILM COATED ORAL at 02:01

## 2022-01-20 RX ADMIN — MELOXICAM 15 MG: 15 TABLET ORAL at 10:01

## 2022-01-20 RX ADMIN — TIZANIDINE 4 MG: 4 TABLET ORAL at 02:01

## 2022-01-20 RX ADMIN — HYDROMORPHONE HYDROCHLORIDE 1 MG: 1 INJECTION, SOLUTION INTRAMUSCULAR; INTRAVENOUS; SUBCUTANEOUS at 09:01

## 2022-01-20 RX ADMIN — VANCOMYCIN HYDROCHLORIDE 1250 MG: 1.25 INJECTION, POWDER, LYOPHILIZED, FOR SOLUTION INTRAVENOUS at 11:01

## 2022-01-20 RX ADMIN — HYDROMORPHONE HYDROCHLORIDE 1 MG: 1 INJECTION, SOLUTION INTRAMUSCULAR; INTRAVENOUS; SUBCUTANEOUS at 03:01

## 2022-01-20 RX ADMIN — LOSARTAN POTASSIUM 25 MG: 25 TABLET, FILM COATED ORAL at 09:01

## 2022-01-20 RX ADMIN — ENOXAPARIN SODIUM 40 MG: 100 INJECTION SUBCUTANEOUS at 05:01

## 2022-01-20 RX ADMIN — TIZANIDINE 4 MG: 4 TABLET ORAL at 09:01

## 2022-01-20 RX ADMIN — AMPICILLIN SODIUM AND SULBACTAM SODIUM 3 G: 2; 1 INJECTION, POWDER, FOR SOLUTION INTRAMUSCULAR; INTRAVENOUS at 09:01

## 2022-01-20 RX ADMIN — AMLODIPINE BESYLATE 5 MG: 5 TABLET ORAL at 09:01

## 2022-01-20 RX ADMIN — ATORVASTATIN CALCIUM 40 MG: 20 TABLET, FILM COATED ORAL at 09:01

## 2022-01-20 RX ADMIN — AMPICILLIN SODIUM AND SULBACTAM SODIUM 3 G: 2; 1 INJECTION, POWDER, FOR SOLUTION INTRAMUSCULAR; INTRAVENOUS at 05:01

## 2022-01-20 RX ADMIN — ASPIRIN 81 MG: 81 TABLET, COATED ORAL at 09:01

## 2022-01-20 RX ADMIN — VANCOMYCIN HYDROCHLORIDE 1250 MG: 1.25 INJECTION, POWDER, LYOPHILIZED, FOR SOLUTION INTRAVENOUS at 10:01

## 2022-01-20 RX ADMIN — ACETAMINOPHEN 1000 MG: 500 TABLET ORAL at 05:01
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
## 2022-01-20 NOTE — ED NOTES
I-STAT Chem-8+ Results:   Value Reference Range   Sodium 136 136-145 mmol/L   Potassium  3.8 3.5-5.1 mmol/L   Chloride 98  mmol/L   Ionized Calcium 1.20 1.06-1.42 mmol/L   CO2 (measured) 27 23-29 mmol/L   Glucose 119  mg/dL   BUN 7 6-30 mg/dL   Creatinine 0.8 0.5-1.4 mg/dL   Hematocrit 30 36-54%

## 2022-01-20 NOTE — CONSULTS
[facility]  Psychology    Patient Name: Juan Carlos Turner  MRN: [id number]     Consult request received. Psychology will see patient Tuesday Jan 25. Thank you for the referral.       Andrei Swift, PhD  Dept. of Psychiatry  [facility]
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
## 2022-01-20 NOTE — ASSESSMENT & PLAN NOTE
On metformin only.  Will put on low-dose sliding scale and diabetic diet.  Hemoglobin A1c ordered for the morning; last 1 from 2 years ago 6.4.

## 2022-01-20 NOTE — PHARMACY MED REC
"Admission Medication History     The home medication history was taken by Gely Mason.    You may go to "Admission" then "Reconcile Home Medications" tabs to review and/or act upon these items.      The home medication list has been updated by the Pharmacy department.    Please read ALL comments highlighted in yellow.    Please address this information as you see fit.     Feel free to contact us if you have any questions or require assistance.      The medications listed below were removed from the home medication list. Please reorder if appropriate:  Patient reports no longer taking the following medication(s):   ASPIRIN 81 MG   CETIRIZINE 10 MG   HYDROCODONE/APAP 5-325 MG   LORATADINE 10 MG   OMEPRAZOLE 20 MG    Medications listed below were obtained from: Patient/family  Current Outpatient Medications on File Prior to Encounter   Medication Sig    albuterol (PROVENTIL) 2.5 mg /3 mL (0.083 %) nebulizer solution Take 2.5 mg by nebulization every 4 to 6 hours as needed for Wheezing or Shortness of Breath.    amitriptyline (ELAVIL) 50 MG tablet Take 50 mg by mouth nightly.    amlodipine (NORVASC) 10 MG tablet Take 10 mg by mouth once daily.    atorvastatin (LIPITOR) 40 MG tablet Take 40 mg by mouth once daily.    citalopram (CELEXA) 20 MG tablet Take 20 mg by mouth once daily.    fluticasone propionate (FLONASE) 50 mcg/actuation nasal spray 1 spray (50 mcg total) by Each Nostril route 2 (two) times daily as needed. (Patient taking differently: 1 spray by Each Nostril route 2 (two) times daily as needed for Rhinitis or Allergies.)    ketotifen (ZADITOR) 0.025 % (0.035 %) ophthalmic solution INSTILL ONE DROP IN AFFECTED EYE ONCE A DAY AS NEEDED FOR ITCHY EYES    losartan (COZAAR) 25 MG tablet Take 25 mg by mouth once daily.    meloxicam (MOBIC) 15 MG tablet Take 15 mg by mouth once daily.    metFORMIN (GLUCOPHAGE) 1000 MG tablet Take 1,000 mg by mouth daily with breakfast.    methocarbamoL " (ROBAXIN) 500 MG Tab Take 500 mg by mouth 2 (two) times daily.    multivitamin capsule Take 1 capsule by mouth once daily.    ondansetron (ZOFRAN-ODT) 8 MG TbDL Take 8 mg by mouth every 8 (eight) hours as needed.    oxyCODONE-acetaminophen (PERCOCET) 5-325 mg per tablet Take 1 tablet by mouth every 6 (six) hours as needed for Pain.    pregabalin (LYRICA) 150 MG capsule Take 150 mg by mouth 2 (two) times daily.    tiZANidine (ZANAFLEX) 4 MG tablet Take 4 mg by mouth nightly.    traMADoL (ULTRAM) 50 mg tablet Take 50 mg by mouth once daily.    VENTOLIN HFA 90 mcg/actuation inhaler Inhale 1-2 puffs into the lungs every 4 to 6 hours as needed for Wheezing or Shortness of Breath.           Gely Mason  EXT [id number]                    .
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
## 2022-01-20 NOTE — ED NOTES
Pam notified via secure chat of pts pain. [facility]. Per  utilize secure chat to contact covering care provider.
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
## 2022-01-20 NOTE — PROGRESS NOTES
Pharmacokinetic Initial Assessment: IV Vancomycin    Assessment/Plan:    Initiate intravenous vancomycin with loading dose of 1750 mg once followed by a maintenance dose of vancomycin 1250 mg IV every 12 hours  Desired empiric serum trough concentration is 10 to 20 mcg/mL  Draw vancomycin trough level 60 min prior to fourth dose on 1/21 at approximately 0930  Pharmacy will continue to follow and monitor vancomycin.      Please contact pharmacy at extension [phone] with any questions regarding this assessment.     Thank you for the consult,   Iva Armenta       Patient brief summary:  Juan Carlos Turner is a 46 y.o. female initiated on antimicrobial therapy with IV Vancomycin for treatment of suspected skin & soft tissue infection    Drug Allergies:   Review of patient's allergies indicates:  No Known Allergies    Actual Body Weight:   89.4 kg    Renal Function:   Estimated Creatinine Clearance: 114.2 mL/min (based on SCr of 0.7 mg/dL).     Dialysis Method (if applicable):  N/A    CBC (last 72 hours):  Recent Labs   Lab Result Units 01/19/22 2014   WBC K/uL 9.36   Hemoglobin g/dL 9.1*   Hematocrit % 29.6*   Platelets K/uL 282   Gran % % 69.4   Lymph % % 19.3   Mono % % 7.3   Eosinophil % % 3.6   Basophil % % 0.2   Differential Method  Automated       Metabolic Panel (last 72 hours):  Recent Labs   Lab Result Units 01/19/22 2014   Sodium mmol/L 133*   Potassium mmol/L 3.8   Chloride mmol/L 99   CO2 mmol/L 25   Glucose mg/dL 115*   BUN mg/dL 8   Creatinine mg/dL 0.7   Albumin g/dL 3.2*   Total Bilirubin mg/dL 0.6   Alkaline Phosphatase U/L 70   AST U/L 25   ALT U/L 21   Magnesium mg/dL 1.8       Drug levels (last 3 results):  No results for input(s): VANCOMYCINRA, VANCOMYCINPE, VANCOMYCINTR in the last 72 hours.    Microbiologic Results:  Microbiology Results (last 7 days)     Procedure Component Value Units Date/Time    Blood culture #2 **CANNOT BE ORDERED STAT** [[id number]] Collected: 01/19/22 2025    Order  Status: Sent Specimen: Blood from Peripheral, Antecubital, Right Updated: 01/19/22 2028    Blood culture #1 **CANNOT BE ORDERED STAT** [[id number]] Collected: 01/19/22 2014    Order Status: Sent Specimen: Blood from Peripheral, Antecubital, Left Updated: 01/19/22 2024

## 2022-01-20 NOTE — ASSESSMENT & PLAN NOTE
Has not filled Breo prescription on med list in some time so likely no longer using.  Will have albuterol nebs available if needed.

## 2022-01-21 LAB
ANION GAP SERPL CALC-SCNC: 9 MMOL/L (ref 8–16)
BASOPHILS # BLD AUTO: 0.03 K/UL (ref 0–0.2)
BASOPHILS NFR BLD: 0.4 % (ref 0–1.9)
BUN SERPL-MCNC: 8 MG/DL (ref 6–20)
CALCIUM SERPL-MCNC: 8.5 MG/DL (ref 8.7–10.5)
CHLORIDE SERPL-SCNC: 102 MMOL/L (ref 95–110)
CO2 SERPL-SCNC: 26 MMOL/L (ref 23–29)
CREAT SERPL-MCNC: 0.7 MG/DL (ref 0.5–1.4)
DIFFERENTIAL METHOD: ABNORMAL
EOSINOPHIL # BLD AUTO: 0.3 K/UL (ref 0–0.5)
EOSINOPHIL NFR BLD: 4.1 % (ref 0–8)
ERYTHROCYTE [DISTWIDTH] IN BLOOD BY AUTOMATED COUNT: 12.9 % (ref 11.5–14.5)
EST. GFR  (AFRICAN AMERICAN): >60 ML/MIN/1.73 M^2
EST. GFR  (NON AFRICAN AMERICAN): >60 ML/MIN/1.73 M^2
GLUCOSE SERPL-MCNC: 139 MG/DL (ref 70–110)
HCT VFR BLD AUTO: 27.3 % (ref 37–48.5)
HGB BLD-MCNC: 8.4 G/DL (ref 12–16)
IMM GRANULOCYTES # BLD AUTO: 0.03 K/UL (ref 0–0.04)
IMM GRANULOCYTES NFR BLD AUTO: 0.4 % (ref 0–0.5)
LYMPHOCYTES # BLD AUTO: 1.8 K/UL (ref 1–4.8)
LYMPHOCYTES NFR BLD: 21.9 % (ref 18–48)
MCH RBC QN AUTO: 27.1 PG (ref 27–31)
MCHC RBC AUTO-ENTMCNC: 30.8 G/DL (ref 32–36)
MCV RBC AUTO: 88 FL (ref 82–98)
MONOCYTES # BLD AUTO: 0.6 K/UL (ref 0.3–1)
MONOCYTES NFR BLD: 7.4 % (ref 4–15)
NEUTROPHILS # BLD AUTO: 5.4 K/UL (ref 1.8–7.7)
NEUTROPHILS NFR BLD: 65.8 % (ref 38–73)
NRBC BLD-RTO: 0 /100 WBC
PLATELET # BLD AUTO: 348 K/UL (ref 150–450)
PMV BLD AUTO: 10.9 FL (ref 9.2–12.9)
POCT GLUCOSE: 117 MG/DL (ref 70–110)
POCT GLUCOSE: 135 MG/DL (ref 70–110)
POCT GLUCOSE: 137 MG/DL (ref 70–110)
POCT GLUCOSE: 218 MG/DL (ref 70–110)
POTASSIUM SERPL-SCNC: 3.8 MMOL/L (ref 3.5–5.1)
RBC # BLD AUTO: 3.1 M/UL (ref 4–5.4)
SODIUM SERPL-SCNC: 137 MMOL/L (ref 136–145)
VANCOMYCIN TROUGH SERPL-MCNC: 9.2 UG/ML (ref 10–22)
WBC # BLD AUTO: 8.12 K/UL (ref 3.9–12.7)

## 2022-01-21 PROCEDURE — 99223 1ST HOSP IP/OBS HIGH 75: CPT | Mod: ,,, | Performed by: INTERNAL MEDICINE

## 2022-01-21 PROCEDURE — 80048 BASIC METABOLIC PNL TOTAL CA: CPT | Performed by: PHYSICIAN ASSISTANT

## 2022-01-21 PROCEDURE — 11000001 HC ACUTE MED/SURG PRIVATE ROOM

## 2022-01-21 PROCEDURE — 25000003 PHARM REV CODE 250: Performed by: INTERNAL MEDICINE

## 2022-01-21 PROCEDURE — 99233 SBSQ HOSP IP/OBS HIGH 50: CPT | Mod: ,,, | Performed by: PHYSICIAN ASSISTANT

## 2022-01-21 PROCEDURE — 63600175 PHARM REV CODE 636 W HCPCS: Performed by: INTERNAL MEDICINE

## 2022-01-21 PROCEDURE — 25000003 PHARM REV CODE 250: Performed by: HOSPITALIST

## 2022-01-21 PROCEDURE — 80202 ASSAY OF VANCOMYCIN: CPT | Performed by: HOSPITALIST

## 2022-01-21 PROCEDURE — 25000003 PHARM REV CODE 250: Performed by: PHYSICIAN ASSISTANT

## 2022-01-21 PROCEDURE — 36415 COLL VENOUS BLD VENIPUNCTURE: CPT | Performed by: HOSPITALIST

## 2022-01-21 PROCEDURE — 63600175 PHARM REV CODE 636 W HCPCS: Performed by: PHYSICIAN ASSISTANT

## 2022-01-21 PROCEDURE — 63600175 PHARM REV CODE 636 W HCPCS: Performed by: HOSPITALIST

## 2022-01-21 PROCEDURE — 99223 PR INITIAL HOSPITAL CARE,LEVL III: ICD-10-PCS | Mod: ,,, | Performed by: INTERNAL MEDICINE

## 2022-01-21 PROCEDURE — 85025 COMPLETE CBC W/AUTO DIFF WBC: CPT | Performed by: PHYSICIAN ASSISTANT

## 2022-01-21 PROCEDURE — 99233 PR SUBSEQUENT HOSPITAL CARE,LEVL III: ICD-10-PCS | Mod: ,,, | Performed by: PHYSICIAN ASSISTANT

## 2022-01-21 RX ADMIN — ASPIRIN 81 MG: 81 TABLET, COATED ORAL at 09:01

## 2022-01-21 RX ADMIN — PREGABALIN 150 MG: 75 CAPSULE ORAL at 09:01

## 2022-01-21 RX ADMIN — AMLODIPINE BESYLATE 5 MG: 5 TABLET ORAL at 09:01

## 2022-01-21 RX ADMIN — LOSARTAN POTASSIUM 25 MG: 25 TABLET, FILM COATED ORAL at 09:01

## 2022-01-21 RX ADMIN — PREGABALIN 150 MG: 75 CAPSULE ORAL at 08:01

## 2022-01-21 RX ADMIN — ENOXAPARIN SODIUM 40 MG: 100 INJECTION SUBCUTANEOUS at 04:01

## 2022-01-21 RX ADMIN — AMPICILLIN SODIUM AND SULBACTAM SODIUM 3 G: 2; 1 INJECTION, POWDER, FOR SOLUTION INTRAMUSCULAR; INTRAVENOUS at 11:01

## 2022-01-21 RX ADMIN — ACETAMINOPHEN 1000 MG: 500 TABLET ORAL at 08:01

## 2022-01-21 RX ADMIN — VANCOMYCIN HYDROCHLORIDE 1500 MG: 1.5 INJECTION, POWDER, LYOPHILIZED, FOR SOLUTION INTRAVENOUS at 08:01

## 2022-01-21 RX ADMIN — MELOXICAM 15 MG: 15 TABLET ORAL at 09:01

## 2022-01-21 RX ADMIN — AMPICILLIN SODIUM AND SULBACTAM SODIUM 3 G: 2; 1 INJECTION, POWDER, FOR SOLUTION INTRAMUSCULAR; INTRAVENOUS at 03:01

## 2022-01-21 RX ADMIN — ACETAMINOPHEN 1000 MG: 500 TABLET ORAL at 04:01

## 2022-01-21 RX ADMIN — TIZANIDINE 4 MG: 4 TABLET ORAL at 08:01

## 2022-01-21 RX ADMIN — DOCUSATE SODIUM 50 MG AND SENNOSIDES 8.6 MG 1 TABLET: 8.6; 5 TABLET, FILM COATED ORAL at 09:01

## 2022-01-21 RX ADMIN — ATORVASTATIN CALCIUM 40 MG: 20 TABLET, FILM COATED ORAL at 09:01

## 2022-01-21 RX ADMIN — INSULIN ASPART 2 UNITS: 100 INJECTION, SOLUTION INTRAVENOUS; SUBCUTANEOUS at 05:01

## 2022-01-21 RX ADMIN — CITALOPRAM HYDROBROMIDE 20 MG: 20 TABLET ORAL at 09:01

## 2022-01-21 RX ADMIN — VANCOMYCIN HYDROCHLORIDE 1250 MG: 1.25 INJECTION, POWDER, LYOPHILIZED, FOR SOLUTION INTRAVENOUS at 09:01

## 2022-01-21 RX ADMIN — AMITRIPTYLINE HYDROCHLORIDE 50 MG: 50 TABLET, FILM COATED ORAL at 08:01

## 2022-01-21 RX ADMIN — AMPICILLIN SODIUM AND SULBACTAM SODIUM 3 G: 2; 1 INJECTION, POWDER, FOR SOLUTION INTRAMUSCULAR; INTRAVENOUS at 04:01

## 2022-01-21 RX ADMIN — DOCUSATE SODIUM 50 MG AND SENNOSIDES 8.6 MG 1 TABLET: 8.6; 5 TABLET, FILM COATED ORAL at 08:01

## 2022-01-21 RX ADMIN — ACETAMINOPHEN 1000 MG: 500 TABLET ORAL at 09:01

## 2022-01-21 RX ADMIN — AMPICILLIN SODIUM AND SULBACTAM SODIUM 3 G: 2; 1 INJECTION, POWDER, FOR SOLUTION INTRAMUSCULAR; INTRAVENOUS at 10:01
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
## 2022-01-21 NOTE — SUBJECTIVE & OBJECTIVE
Past Medical History:   Diagnosis Date    Anxiety     Asthma     DDD (degenerative disc disease), cervical     Diabetes mellitus     Hypertension     JOSUE on CPAP        Past Surgical History:   Procedure Laterality Date    ARTHROSCOPY OF KNEE Right 4/15/2021    Procedure: ARTHROSCOPY, KNEE LYSIS OF ADHESIONS;  Surgeon: Che Ruby MD;  Location: [facility] OR;  Service: Orthopedics;  Laterality: Right;    CHONDROPLASTY OF KNEE Right 4/15/2021    Procedure: CHONDROPLASTY, KNEE;  Surgeon: Che Ruby MD;  Location: [facility] OR;  Service: Orthopedics;  Laterality: Right;    EPIDURAL STEROID INJECTION N/A 10/23/2018    Procedure: Injection, Steroid, Epidural LUMBAR L4/5 TESS;  Surgeon: Ed Pina MD;  Location: [facility] PAIN MGT;  Service: Pain Management;  Laterality: N/A;    HYSTERECTOMY      laparoscopic    KNEE ARTHROSCOPY W/ MENISCECTOMY Right 4/15/2021    Procedure: ARTHROSCOPY, KNEE, WITH MENISCECTOMY LATERAL, PLICA  EXCISION;  Surgeon: Che Ruby MD;  Location: [facility] OR;  Service: Orthopedics;  Laterality: Right;    LAPAROSCOPIC SLEEVE GASTRECTOMY N/A 5/13/2019    Procedure: GASTRECTOMY, SLEEVE, LAPAROSCOPIC;  Surgeon: Jie Montanez MD;  Location: [facility] OR;  Service: General;  Laterality: N/A;    right knee surgery      SYNOVECTOMY OF KNEE Right 4/15/2021    Procedure: SYNOVECTOMY, KNEE;  Surgeon: Che Ruby MD;  Location: [facility] OR;  Service: Orthopedics;  Laterality: Right;       Review of patient's allergies indicates:  No Known Allergies    Medications:  Medications Prior to Admission   Medication Sig    albuterol (PROVENTIL) 2.5 mg /3 mL (0.083 %) nebulizer solution Take 2.5 mg by nebulization every 4 to 6 hours as needed for Wheezing or Shortness of Breath.    amitriptyline (ELAVIL) 50 MG tablet Take 50 mg by mouth nightly.    amlodipine (NORVASC) 10 MG tablet Take 10 mg by mouth once daily.    atorvastatin (LIPITOR) 40 MG tablet Take 40 mg by mouth once daily.    citalopram (CELEXA) 20 MG  tablet Take 20 mg by mouth once daily.    fluticasone propionate (FLONASE) 50 mcg/actuation nasal spray 1 spray (50 mcg total) by Each Nostril route 2 (two) times daily as needed. (Patient taking differently: 1 spray by Each Nostril route 2 (two) times daily as needed for Rhinitis or Allergies.)    ketotifen (ZADITOR) 0.025 % (0.035 %) ophthalmic solution INSTILL ONE DROP IN AFFECTED EYE ONCE A DAY AS NEEDED FOR ITCHY EYES    losartan (COZAAR) 25 MG tablet Take 25 mg by mouth once daily.    meloxicam (MOBIC) 15 MG tablet Take 15 mg by mouth once daily.    metFORMIN (GLUCOPHAGE) 1000 MG tablet Take 1,000 mg by mouth daily with breakfast.    methocarbamoL (ROBAXIN) 500 MG Tab Take 500 mg by mouth 2 (two) times daily.    multivitamin capsule Take 1 capsule by mouth once daily.    ondansetron (ZOFRAN-ODT) 8 MG TbDL Take 8 mg by mouth every 8 (eight) hours as needed.    oxyCODONE-acetaminophen (PERCOCET) 5-325 mg per tablet Take 1 tablet by mouth every 6 (six) hours as needed for Pain.    pregabalin (LYRICA) 150 MG capsule Take 150 mg by mouth 2 (two) times daily.    tiZANidine (ZANAFLEX) 4 MG tablet Take 4 mg by mouth nightly.    traMADoL (ULTRAM) 50 mg tablet Take 50 mg by mouth once daily.    VENTOLIN HFA 90 mcg/actuation inhaler Inhale 1-2 puffs into the lungs every 4 to 6 hours as needed for Wheezing or Shortness of Breath.    ONETOUCH DELICA LANCETS 33 gauge Misc TEST ONCE D    ONETOUCH ULTRA BLUE TEST STRIP Strp TEST ONCE D BEFORE BREAKFAST    ONETOUCH ULTRAMINI kit UTD     Antibiotics (From admission, onward)            Start     Stop Route Frequency Ordered    01/21/22 2130  vancomycin 1.5 g in dextrose 5 % 250 mL IVPB (ready to mix)         -- IV Every 12 hours (non-standard times) 01/21/22 1008    01/20/22 1630  ampicillin-sulbactam 3 g in sodium chloride 0.9 % 100 mL IVPB (ready to mix system)         -- IV Every 6 hours (non-standard times) 01/20/22 1526    01/20/22 0328  vancomycin -  "pharmacy to dose  (vancomycin IVPB)        "And" Linked Group Details    -- IV pharmacy to manage frequency 01/20/22 0228        Antifungals (From admission, onward)            None        Antivirals (From admission, onward)    None             There is no immunization history on file for this patient.    Family History     Problem Relation (Age of Onset)    Diabetes Father        Social History     Socioeconomic History    Marital status: Single   Tobacco Use    Smoking status: Never Smoker    Smokeless tobacco: Never Used   Substance and Sexual Activity    Alcohol use: Yes     Comment: social    Drug use: No     Review of Systems   Constitutional: Negative for appetite change, chills, diaphoresis, fatigue, fever and unexpected weight change.   HENT: Negative for congestion, ear pain and hearing loss.    Respiratory: Negative for cough, chest tightness, shortness of breath and wheezing.    Cardiovascular: Negative for chest pain.   Gastrointestinal: Negative for abdominal pain, constipation, diarrhea, nausea and vomiting.   Genitourinary: Negative for decreased urine volume, difficulty urinating, dysuria, flank pain, frequency, hematuria and urgency.   Musculoskeletal: Negative for arthralgias, back pain and myalgias.   Skin: Positive for wound. Negative for rash.   Neurological: Negative for dizziness, facial asymmetry and headaches.   Psychiatric/Behavioral: Negative for behavioral problems and confusion.     Objective:     Vital Signs (Most Recent):  Temp: 98.2 °F (36.8 °C) (01/21/22 1216)  Pulse: 79 (01/21/22 1216)  Resp: 20 (01/21/22 1216)  BP: 133/68 (01/21/22 1216)  SpO2: (!) 92 % (01/21/22 1216) Vital Signs (24h Range):  Temp:  [97.9 °F (36.6 °C)-98.5 °F (36.9 °C)] 98.2 °F (36.8 °C)  Pulse:  [62-86] 79  Resp:  [16-20] 20  SpO2:  [92 %-99 %] 92 %  BP: ()/(52-78) 133/68     Weight: 96.4 kg (212 lb 8.4 oz)  Body mass index is 34.3 kg/m².    Estimated Creatinine Clearance: 118.7 mL/min (based on SCr " of 0.7 mg/dL).    Physical Exam  Vitals and nursing note reviewed.   Constitutional:       General: She is not in acute distress.     Appearance: She is well-developed and well-nourished. She is not diaphoretic.   HENT:      Head: Normocephalic and atraumatic.   Eyes:      Extraocular Movements: EOM normal.      Pupils: Pupils are equal, round, and reactive to light.   Cardiovascular:      Rate and Rhythm: Normal rate and regular rhythm.      Pulses: Intact distal pulses.      Heart sounds: Normal heart sounds. No murmur heard.  No friction rub. No gallop.    Pulmonary:      Effort: Pulmonary effort is normal. No respiratory distress.      Breath sounds: Normal breath sounds. No wheezing or rales.   Chest:      Chest wall: No tenderness.   Abdominal:      General: Bowel sounds are normal. There is no distension.      Palpations: Abdomen is soft. There is no mass.      Tenderness: There is no abdominal tenderness. There is no guarding or rebound.      Hernia: No hernia is present.   Musculoskeletal:         General: Swelling, tenderness and signs of injury present. No deformity or edema. Normal range of motion.      Cervical back: Normal range of motion and neck supple.   Skin:     General: Skin is warm and dry.      Coloration: Skin is not pale.      Findings: No erythema.      Comments: Right medial thigh wound with serous drainage. Tender    Neurological:      Mental Status: She is alert and oriented to person, place, and time.      Cranial Nerves: No cranial nerve deficit.      Coordination: Coordination normal.   Psychiatric:         Mood and Affect: Mood and affect normal.         Behavior: Behavior normal.         Thought Content: Thought content normal.             Significant Labs: All pertinent labs within the past 24 hours have been reviewed.    Significant Imaging: I have reviewed all pertinent imaging results/findings within the past 24 hours.
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
## 2022-01-21 NOTE — PROGRESS NOTES
Pharmacokinetic Assessment Follow Up: IV Vancomycin    Vancomycin serum concentration assessment(s):    The trough level was drawn correctly and can be used to guide therapy at this time. The measurement is below the desired definitive target range of 10 to 20 mcg/mL.    Vancomycin Regimen Plan:    Plan to discontinue the current vancomycin regimen of 1.25 g IV q12h and change to 1.5 g IV q12h  Plan for next trough prior to the fourth dose on 1/23 at 0830    Drug levels (last 3 results):  Recent Labs   Lab Result Units 01/21/22  0917   Vancomycin-Trough ug/mL 9.2*       Pharmacy will continue to follow and monitor vancomycin.    Please contact pharmacy at extension [phone] for questions regarding this assessment.    Thank you for the consult,   Gerardo Rollins, PharmD, [facility]      Patient brief summary:  Juan Carlos Turner is a 46 y.o. female initiated on antimicrobial therapy with IV Vancomycin for treatment of skin & soft tissue infection    Drug Allergies:   Review of patient's allergies indicates:  No Known Allergies    Actual Body Weight:   96.4 kg    Renal Function:   Estimated Creatinine Clearance: 118.7 mL/min (based on SCr of 0.7 mg/dL).,     Dialysis Method (if applicable):  N/A    CBC (last 72 hours):  Recent Labs   Lab Result Units 01/19/22 2014 01/20/22  0523 01/21/22  0917   WBC K/uL 9.36  --  8.12   Hemoglobin g/dL 9.1*  --  8.4*   Hemoglobin A1C %  --  6.1*  --    Hematocrit % 29.6*  --  27.3*   Platelets K/uL 282  --  348   Gran % % 69.4  --  65.8   Lymph % % 19.3  --  21.9   Mono % % 7.3  --  7.4   Eosinophil % % 3.6  --  4.1   Basophil % % 0.2  --  0.4   Differential Method  Automated  --  Automated       Metabolic Panel (last 72 hours):  Recent Labs   Lab Result Units 01/19/22 2014 01/21/22  0917   Sodium mmol/L 133* 137   Potassium mmol/L 3.8 3.8   Chloride mmol/L 99 102   CO2 mmol/L 25 26   Glucose mg/dL 115* 139*   BUN mg/dL 8 8   Creatinine mg/dL 0.7 0.7   Albumin g/dL 3.2*  --    Total  Bilirubin mg/dL 0.6  --    Alkaline Phosphatase U/L 70  --    AST U/L 25  --    ALT U/L 21  --    Magnesium mg/dL 1.8  --        Vancomycin Administrations:  vancomycin given in the last 96 hours                   vancomycin 1.25 g in dextrose 5% 250 mL IVPB (ready to mix) (mg) 1,250 mg New Bag 01/21/22 0941     1,250 mg New Bag 01/20/22 2321     1,250 mg New Bag  1034    vancomycin 1.75 g in 5 % dextrose 500 mL IVPB (mg) 1,750 mg New Bag 01/19/22 2238                Microbiologic Results:  Microbiology Results (last 7 days)     Procedure Component Value Units Date/Time    Blood culture #1 **CANNOT BE ORDERED STAT** [[id number]] Collected: 01/19/22 2014    Order Status: Completed Specimen: Blood from Peripheral, Antecubital, Left Updated: 01/20/22 2212     Blood Culture, Routine No Growth to date      No Growth to date    Blood culture #2 **CANNOT BE ORDERED STAT** [[id number]] Collected: 01/19/22 2025    Order Status: Completed Specimen: Blood from Peripheral, Antecubital, Right Updated: 01/20/22 2212     Blood Culture, Routine No Growth to date      No Growth to date    Culture, Anaerobe [[id number]]     Order Status: No result Specimen: Wound     Aerobic culture [[id number]]     Order Status: No result Specimen: Wound
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
## 2022-01-21 NOTE — NURSING
Admitted to unit. Oriented to room and unit. Made comfortable in bed. Bed on lowest position, locked. Call bell within reach. Vital signs taken and recorded. Assisted in her needs. Will continue to monitor.

## 2022-01-21 NOTE — PLAN OF CARE
POC reviewed. Independent in ADLs. Ambulating to restroom independently. Wound dressing changed. Comfort and safety promoted. Bed on lowest position, locked. Call bell within reach. Due meds given. Bedside nursing care done.

## 2022-01-22 LAB
POCT GLUCOSE: 110 MG/DL (ref 70–110)
POCT GLUCOSE: 133 MG/DL (ref 70–110)
POCT GLUCOSE: 167 MG/DL (ref 70–110)
POCT GLUCOSE: 209 MG/DL (ref 70–110)

## 2022-01-22 PROCEDURE — 63600175 PHARM REV CODE 636 W HCPCS: Performed by: INTERNAL MEDICINE

## 2022-01-22 PROCEDURE — 11000001 HC ACUTE MED/SURG PRIVATE ROOM

## 2022-01-22 PROCEDURE — 25000003 PHARM REV CODE 250: Performed by: PHYSICIAN ASSISTANT

## 2022-01-22 PROCEDURE — 94761 N-INVAS EAR/PLS OXIMETRY MLT: CPT

## 2022-01-22 PROCEDURE — 25000003 PHARM REV CODE 250: Performed by: HOSPITALIST

## 2022-01-22 PROCEDURE — 63600175 PHARM REV CODE 636 W HCPCS: Performed by: PHYSICIAN ASSISTANT

## 2022-01-22 PROCEDURE — 99233 PR SUBSEQUENT HOSPITAL CARE,LEVL III: ICD-10-PCS | Mod: ,,, | Performed by: PHYSICIAN ASSISTANT

## 2022-01-22 PROCEDURE — 25000003 PHARM REV CODE 250: Performed by: INTERNAL MEDICINE

## 2022-01-22 PROCEDURE — 63600175 PHARM REV CODE 636 W HCPCS: Performed by: HOSPITALIST

## 2022-01-22 PROCEDURE — 99233 SBSQ HOSP IP/OBS HIGH 50: CPT | Mod: ,,, | Performed by: PHYSICIAN ASSISTANT

## 2022-01-22 RX ADMIN — ACETAMINOPHEN 1000 MG: 500 TABLET ORAL at 10:01

## 2022-01-22 RX ADMIN — AMPICILLIN SODIUM AND SULBACTAM SODIUM 3 G: 2; 1 INJECTION, POWDER, FOR SOLUTION INTRAMUSCULAR; INTRAVENOUS at 11:01

## 2022-01-22 RX ADMIN — PREGABALIN 150 MG: 75 CAPSULE ORAL at 08:01

## 2022-01-22 RX ADMIN — ATORVASTATIN CALCIUM 40 MG: 20 TABLET, FILM COATED ORAL at 08:01

## 2022-01-22 RX ADMIN — CITALOPRAM HYDROBROMIDE 20 MG: 20 TABLET ORAL at 08:01

## 2022-01-22 RX ADMIN — LOSARTAN POTASSIUM 25 MG: 25 TABLET, FILM COATED ORAL at 08:01

## 2022-01-22 RX ADMIN — AMPICILLIN SODIUM AND SULBACTAM SODIUM 3 G: 2; 1 INJECTION, POWDER, FOR SOLUTION INTRAMUSCULAR; INTRAVENOUS at 10:01

## 2022-01-22 RX ADMIN — VANCOMYCIN HYDROCHLORIDE 1500 MG: 1.5 INJECTION, POWDER, LYOPHILIZED, FOR SOLUTION INTRAVENOUS at 08:01

## 2022-01-22 RX ADMIN — ASPIRIN 81 MG: 81 TABLET, COATED ORAL at 08:01

## 2022-01-22 RX ADMIN — AMPICILLIN SODIUM AND SULBACTAM SODIUM 3 G: 2; 1 INJECTION, POWDER, FOR SOLUTION INTRAMUSCULAR; INTRAVENOUS at 04:01

## 2022-01-22 RX ADMIN — DOCUSATE SODIUM 50 MG AND SENNOSIDES 8.6 MG 1 TABLET: 8.6; 5 TABLET, FILM COATED ORAL at 08:01

## 2022-01-22 RX ADMIN — VANCOMYCIN HYDROCHLORIDE 1500 MG: 1.5 INJECTION, POWDER, LYOPHILIZED, FOR SOLUTION INTRAVENOUS at 09:01

## 2022-01-22 RX ADMIN — ENOXAPARIN SODIUM 40 MG: 100 INJECTION SUBCUTANEOUS at 04:01

## 2022-01-22 RX ADMIN — AMLODIPINE BESYLATE 5 MG: 5 TABLET ORAL at 08:01

## 2022-01-22 RX ADMIN — AMITRIPTYLINE HYDROCHLORIDE 50 MG: 50 TABLET, FILM COATED ORAL at 08:01

## 2022-01-22 RX ADMIN — TIZANIDINE 4 MG: 4 TABLET ORAL at 08:01

## 2022-01-22 RX ADMIN — MELOXICAM 15 MG: 15 TABLET ORAL at 08:01

## 2022-01-22 RX ADMIN — ACETAMINOPHEN 1000 MG: 500 TABLET ORAL at 08:01
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
## 2022-01-22 NOTE — PLAN OF CARE
POC reviewed. Independent in ADLs. Ambulating to restroom independently. Changed dressing on right thigh wound. Due meds given. Comfort and safety promoted. Assisted in her needs. Bed on lowest position, locked. Call bell within reach. Bedside nursing care done.

## 2022-01-23 LAB
ANION GAP SERPL CALC-SCNC: 8 MMOL/L (ref 8–16)
BASOPHILS # BLD AUTO: 0.03 K/UL (ref 0–0.2)
BASOPHILS NFR BLD: 0.4 % (ref 0–1.9)
BUN SERPL-MCNC: 6 MG/DL (ref 6–20)
CALCIUM SERPL-MCNC: 8.5 MG/DL (ref 8.7–10.5)
CHLORIDE SERPL-SCNC: 104 MMOL/L (ref 95–110)
CO2 SERPL-SCNC: 26 MMOL/L (ref 23–29)
CREAT SERPL-MCNC: 0.6 MG/DL (ref 0.5–1.4)
DIFFERENTIAL METHOD: ABNORMAL
EOSINOPHIL # BLD AUTO: 0.4 K/UL (ref 0–0.5)
EOSINOPHIL NFR BLD: 4.7 % (ref 0–8)
ERYTHROCYTE [DISTWIDTH] IN BLOOD BY AUTOMATED COUNT: 13.1 % (ref 11.5–14.5)
EST. GFR  (AFRICAN AMERICAN): >60 ML/MIN/1.73 M^2
EST. GFR  (NON AFRICAN AMERICAN): >60 ML/MIN/1.73 M^2
GLUCOSE SERPL-MCNC: 136 MG/DL (ref 70–110)
HCT VFR BLD AUTO: 27.3 % (ref 37–48.5)
HGB BLD-MCNC: 8.3 G/DL (ref 12–16)
IMM GRANULOCYTES # BLD AUTO: 0.04 K/UL (ref 0–0.04)
IMM GRANULOCYTES NFR BLD AUTO: 0.5 % (ref 0–0.5)
LYMPHOCYTES # BLD AUTO: 2 K/UL (ref 1–4.8)
LYMPHOCYTES NFR BLD: 26.9 % (ref 18–48)
MCH RBC QN AUTO: 27.4 PG (ref 27–31)
MCHC RBC AUTO-ENTMCNC: 30.4 G/DL (ref 32–36)
MCV RBC AUTO: 90 FL (ref 82–98)
MONOCYTES # BLD AUTO: 0.4 K/UL (ref 0.3–1)
MONOCYTES NFR BLD: 5.5 % (ref 4–15)
NEUTROPHILS # BLD AUTO: 4.7 K/UL (ref 1.8–7.7)
NEUTROPHILS NFR BLD: 62 % (ref 38–73)
NRBC BLD-RTO: 0 /100 WBC
PLATELET # BLD AUTO: 413 K/UL (ref 150–450)
PMV BLD AUTO: 10.6 FL (ref 9.2–12.9)
POCT GLUCOSE: 103 MG/DL (ref 70–110)
POCT GLUCOSE: 142 MG/DL (ref 70–110)
POCT GLUCOSE: 167 MG/DL (ref 70–110)
POCT GLUCOSE: 92 MG/DL (ref 70–110)
POTASSIUM SERPL-SCNC: 3.6 MMOL/L (ref 3.5–5.1)
RBC # BLD AUTO: 3.03 M/UL (ref 4–5.4)
SODIUM SERPL-SCNC: 138 MMOL/L (ref 136–145)
VANCOMYCIN TROUGH SERPL-MCNC: 8.8 UG/ML (ref 10–22)
WBC # BLD AUTO: 7.59 K/UL (ref 3.9–12.7)

## 2022-01-23 PROCEDURE — 36415 COLL VENOUS BLD VENIPUNCTURE: CPT | Performed by: INTERNAL MEDICINE

## 2022-01-23 PROCEDURE — 80048 BASIC METABOLIC PNL TOTAL CA: CPT | Performed by: PHYSICIAN ASSISTANT

## 2022-01-23 PROCEDURE — 80202 ASSAY OF VANCOMYCIN: CPT | Performed by: INTERNAL MEDICINE

## 2022-01-23 PROCEDURE — 99233 SBSQ HOSP IP/OBS HIGH 50: CPT | Mod: ,,, | Performed by: PHYSICIAN ASSISTANT

## 2022-01-23 PROCEDURE — 11000001 HC ACUTE MED/SURG PRIVATE ROOM

## 2022-01-23 PROCEDURE — 99233 PR SUBSEQUENT HOSPITAL CARE,LEVL III: ICD-10-PCS | Mod: ,,, | Performed by: PHYSICIAN ASSISTANT

## 2022-01-23 PROCEDURE — 63600175 PHARM REV CODE 636 W HCPCS: Performed by: INTERNAL MEDICINE

## 2022-01-23 PROCEDURE — 63600175 PHARM REV CODE 636 W HCPCS: Performed by: PHYSICIAN ASSISTANT

## 2022-01-23 PROCEDURE — 25000003 PHARM REV CODE 250: Performed by: INTERNAL MEDICINE

## 2022-01-23 PROCEDURE — 25000003 PHARM REV CODE 250: Performed by: PHYSICIAN ASSISTANT

## 2022-01-23 PROCEDURE — 25000003 PHARM REV CODE 250: Performed by: HOSPITALIST

## 2022-01-23 PROCEDURE — 85025 COMPLETE CBC W/AUTO DIFF WBC: CPT | Performed by: PHYSICIAN ASSISTANT

## 2022-01-23 PROCEDURE — 63600175 PHARM REV CODE 636 W HCPCS: Performed by: HOSPITALIST

## 2022-01-23 RX ADMIN — MELOXICAM 15 MG: 15 TABLET ORAL at 08:01

## 2022-01-23 RX ADMIN — LOSARTAN POTASSIUM 25 MG: 25 TABLET, FILM COATED ORAL at 08:01

## 2022-01-23 RX ADMIN — DOCUSATE SODIUM 50 MG AND SENNOSIDES 8.6 MG 1 TABLET: 8.6; 5 TABLET, FILM COATED ORAL at 08:01

## 2022-01-23 RX ADMIN — CITALOPRAM HYDROBROMIDE 20 MG: 20 TABLET ORAL at 08:01

## 2022-01-23 RX ADMIN — OXYCODONE HYDROCHLORIDE 10 MG: 10 TABLET ORAL at 03:01

## 2022-01-23 RX ADMIN — AMPICILLIN SODIUM AND SULBACTAM SODIUM 3 G: 2; 1 INJECTION, POWDER, FOR SOLUTION INTRAMUSCULAR; INTRAVENOUS at 04:01

## 2022-01-23 RX ADMIN — AMITRIPTYLINE HYDROCHLORIDE 50 MG: 50 TABLET, FILM COATED ORAL at 08:01

## 2022-01-23 RX ADMIN — PREGABALIN 150 MG: 75 CAPSULE ORAL at 08:01

## 2022-01-23 RX ADMIN — ACETAMINOPHEN 1000 MG: 500 TABLET ORAL at 08:01

## 2022-01-23 RX ADMIN — AMPICILLIN SODIUM AND SULBACTAM SODIUM 3 G: 2; 1 INJECTION, POWDER, FOR SOLUTION INTRAMUSCULAR; INTRAVENOUS at 09:01

## 2022-01-23 RX ADMIN — VANCOMYCIN HYDROCHLORIDE 250 MG: 1 INJECTION, POWDER, LYOPHILIZED, FOR SOLUTION INTRAVENOUS at 11:01

## 2022-01-23 RX ADMIN — ASPIRIN 81 MG: 81 TABLET, COATED ORAL at 08:01

## 2022-01-23 RX ADMIN — AMPICILLIN SODIUM AND SULBACTAM SODIUM 3 G: 2; 1 INJECTION, POWDER, FOR SOLUTION INTRAMUSCULAR; INTRAVENOUS at 03:01

## 2022-01-23 RX ADMIN — AMPICILLIN SODIUM AND SULBACTAM SODIUM 3 G: 2; 1 INJECTION, POWDER, FOR SOLUTION INTRAMUSCULAR; INTRAVENOUS at 10:01

## 2022-01-23 RX ADMIN — ENOXAPARIN SODIUM 40 MG: 100 INJECTION SUBCUTANEOUS at 04:01

## 2022-01-23 RX ADMIN — TIZANIDINE 4 MG: 4 TABLET ORAL at 08:01

## 2022-01-23 RX ADMIN — VANCOMYCIN HYDROCHLORIDE 1500 MG: 1.5 INJECTION, POWDER, LYOPHILIZED, FOR SOLUTION INTRAVENOUS at 09:01

## 2022-01-23 RX ADMIN — AMLODIPINE BESYLATE 5 MG: 5 TABLET ORAL at 08:01

## 2022-01-23 RX ADMIN — ATORVASTATIN CALCIUM 40 MG: 20 TABLET, FILM COATED ORAL at 08:01

## 2022-01-23 RX ADMIN — ACETAMINOPHEN 1000 MG: 500 TABLET ORAL at 03:01
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
## 2022-01-23 NOTE — PLAN OF CARE
POC reviewed. Independent in ADLs. Ambulating to restroom independently. Complained of pain on right leg, prn pain med given. Due meds given. Assisted in her needs. Bedside nursing care done. Will continue to monitor.

## 2022-01-23 NOTE — PLAN OF CARE
Problem: Adult Inpatient Plan of Care  Goal: Plan of Care Review  Outcome: Ongoing, Progressing  Goal: Patient-Specific Goal (Individualized)  Outcome: Ongoing, Progressing  Goal: Absence of Hospital-Acquired Illness or Injury  Outcome: Ongoing, Progressing  Goal: Optimal Comfort and Wellbeing  Outcome: Ongoing, Progressing  Goal: Readiness for Transition of Care  Outcome: Ongoing, Progressing     Problem: Diabetes Comorbidity  Goal: Blood Glucose Level Within Targeted Range  Outcome: Ongoing, Progressing     Problem: Adult Inpatient Plan of Care  Goal: Optimal Comfort and Wellbeing  Outcome: Ongoing, Progressing
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
## 2022-01-23 NOTE — PROGRESS NOTES
Pharmacokinetic Assessment Follow Up: IV Vancomycin    Vancomycin serum concentration assessment(s):    The trough level was drawn correctly and can be used to guide therapy at this time. The measurement is below the desired definitive target range of 10 to 20 mcg/mL.    Vancomycin Regimen Plan:    Change regimen to Vancomycin 1750 mg IV every 12 hours with next serum trough concentration measured at 20:00 prior to 4th dose on 1/24/22    Drug levels (last 3 results):  Recent Labs   Lab Result Units 01/21/22  0917 01/23/22  0830   Vancomycin-Trough ug/mL 9.2* 8.8*       Pharmacy will continue to follow and monitor vancomycin.    Please contact pharmacy at extension [phone] for questions regarding this assessment.    Thank you for the consult,   Pedro Hughes       Patient brief summary:  Juan Carlos Turner is a 46 y.o. female initiated on antimicrobial therapy with IV Vancomycin for treatment of skin & soft tissue infection    The patient's current regimen is Vancomycin 1500 mg IV every 12 hours    Drug Allergies:   Review of patient's allergies indicates:  No Known Allergies    Actual Body Weight:   96.4 kg    Renal Function:   Estimated Creatinine Clearance: 138.5 mL/min (based on SCr of 0.6 mg/dL).,     Dialysis Method (if applicable):  N/A    CBC (last 72 hours):  Recent Labs   Lab Result Units 01/21/22  0917 01/23/22  0830   WBC K/uL 8.12 7.59   Hemoglobin g/dL 8.4* 8.3*   Hematocrit % 27.3* 27.3*   Platelets K/uL 348 413   Gran % % 65.8 62.0   Lymph % % 21.9 26.9   Mono % % 7.4 5.5   Eosinophil % % 4.1 4.7   Basophil % % 0.4 0.4   Differential Method  Automated Automated       Metabolic Panel (last 72 hours):  Recent Labs   Lab Result Units 01/21/22  0917 01/23/22  0830   Sodium mmol/L 137 138   Potassium mmol/L 3.8 3.6   Chloride mmol/L 102 104   CO2 mmol/L 26 26   Glucose mg/dL 139* 136*   BUN mg/dL 8 6   Creatinine mg/dL 0.7 0.6       Vancomycin Administrations:  vancomycin given in the last 96 hours                    vancomycin 1.5 g in dextrose 5 % 250 mL IVPB (ready to mix) (mg) 1,500 mg New Bag 01/23/22 0900     1,500 mg New Bag 01/22/22 2141     1,500 mg New Bag  0854     1,500 mg New Bag 01/21/22 2044    vancomycin 1.25 g in dextrose 5% 250 mL IVPB (ready to mix) (mg) 1,250 mg New Bag 01/21/22 0941     1,250 mg New Bag 01/20/22 2321     1,250 mg New Bag  1034    vancomycin 1.75 g in 5 % dextrose 500 mL IVPB (mg) 1,750 mg New Bag 01/19/22 2238                Microbiologic Results:  Microbiology Results (last 7 days)     Procedure Component Value Units Date/Time    Blood culture #2 **CANNOT BE ORDERED STAT** [[id number]] Collected: 01/19/22 2025    Order Status: Completed Specimen: Blood from Peripheral, Antecubital, Right Updated: 01/22/22 2212     Blood Culture, Routine No Growth to date      No Growth to date      No Growth to date      No Growth to date    Blood culture #1 **CANNOT BE ORDERED STAT** [[id number]] Collected: 01/19/22 2014    Order Status: Completed Specimen: Blood from Peripheral, Antecubital, Left Updated: 01/22/22 2212     Blood Culture, Routine No Growth to date      No Growth to date      No Growth to date      No Growth to date    Culture, Anaerobe [[id number]]     Order Status: No result Specimen: Wound     Aerobic culture [[id number]]     Order Status: No result Specimen: Wound
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
## 2022-01-24 LAB
BACTERIA BLD CULT: NORMAL
BACTERIA BLD CULT: NORMAL
POCT GLUCOSE: 107 MG/DL (ref 70–110)
POCT GLUCOSE: 108 MG/DL (ref 70–110)
POCT GLUCOSE: 149 MG/DL (ref 70–110)
POCT GLUCOSE: 286 MG/DL (ref 70–110)

## 2022-01-24 PROCEDURE — 25000003 PHARM REV CODE 250: Performed by: HOSPITALIST

## 2022-01-24 PROCEDURE — 97116 GAIT TRAINING THERAPY: CPT

## 2022-01-24 PROCEDURE — 80202 ASSAY OF VANCOMYCIN: CPT | Performed by: INTERNAL MEDICINE

## 2022-01-24 PROCEDURE — 63600175 PHARM REV CODE 636 W HCPCS: Performed by: HOSPITALIST

## 2022-01-24 PROCEDURE — 97535 SELF CARE MNGMENT TRAINING: CPT

## 2022-01-24 PROCEDURE — 63600175 PHARM REV CODE 636 W HCPCS: Performed by: PHYSICIAN ASSISTANT

## 2022-01-24 PROCEDURE — 25000003 PHARM REV CODE 250: Performed by: PHYSICIAN ASSISTANT

## 2022-01-24 PROCEDURE — 99233 SBSQ HOSP IP/OBS HIGH 50: CPT | Mod: ,,, | Performed by: PHYSICIAN ASSISTANT

## 2022-01-24 PROCEDURE — 36415 COLL VENOUS BLD VENIPUNCTURE: CPT | Performed by: INTERNAL MEDICINE

## 2022-01-24 PROCEDURE — 11000001 HC ACUTE MED/SURG PRIVATE ROOM

## 2022-01-24 PROCEDURE — 97165 OT EVAL LOW COMPLEX 30 MIN: CPT

## 2022-01-24 PROCEDURE — 63600175 PHARM REV CODE 636 W HCPCS: Performed by: SURGERY

## 2022-01-24 PROCEDURE — 99233 PR SUBSEQUENT HOSPITAL CARE,LEVL III: ICD-10-PCS | Mod: ,,, | Performed by: STUDENT IN AN ORGANIZED HEALTH CARE EDUCATION/TRAINING PROGRAM

## 2022-01-24 PROCEDURE — 99233 SBSQ HOSP IP/OBS HIGH 50: CPT | Mod: ,,, | Performed by: STUDENT IN AN ORGANIZED HEALTH CARE EDUCATION/TRAINING PROGRAM

## 2022-01-24 PROCEDURE — 97161 PT EVAL LOW COMPLEX 20 MIN: CPT

## 2022-01-24 PROCEDURE — 99233 PR SUBSEQUENT HOSPITAL CARE,LEVL III: ICD-10-PCS | Mod: ,,, | Performed by: PHYSICIAN ASSISTANT

## 2022-01-24 PROCEDURE — 25000003 PHARM REV CODE 250: Performed by: INTERNAL MEDICINE

## 2022-01-24 PROCEDURE — 63600175 PHARM REV CODE 636 W HCPCS: Performed by: INTERNAL MEDICINE

## 2022-01-24 RX ORDER — HYDROMORPHONE HYDROCHLORIDE 1 MG/ML
1 INJECTION, SOLUTION INTRAMUSCULAR; INTRAVENOUS; SUBCUTANEOUS ONCE
Status: COMPLETED | OUTPATIENT
Start: 2022-01-24 | End: 2022-01-24

## 2022-01-24 RX ADMIN — DOCUSATE SODIUM 50 MG AND SENNOSIDES 8.6 MG 1 TABLET: 8.6; 5 TABLET, FILM COATED ORAL at 09:01

## 2022-01-24 RX ADMIN — ACETAMINOPHEN 1000 MG: 500 TABLET ORAL at 09:01

## 2022-01-24 RX ADMIN — LOSARTAN POTASSIUM 25 MG: 25 TABLET, FILM COATED ORAL at 10:01

## 2022-01-24 RX ADMIN — AMPICILLIN SODIUM AND SULBACTAM SODIUM 3 G: 2; 1 INJECTION, POWDER, FOR SOLUTION INTRAMUSCULAR; INTRAVENOUS at 10:01

## 2022-01-24 RX ADMIN — VANCOMYCIN HYDROCHLORIDE 1750 MG: 500 INJECTION, POWDER, LYOPHILIZED, FOR SOLUTION INTRAVENOUS at 12:01

## 2022-01-24 RX ADMIN — ACETAMINOPHEN 1000 MG: 500 TABLET ORAL at 02:01

## 2022-01-24 RX ADMIN — MELOXICAM 15 MG: 15 TABLET ORAL at 10:01

## 2022-01-24 RX ADMIN — AMITRIPTYLINE HYDROCHLORIDE 50 MG: 50 TABLET, FILM COATED ORAL at 09:01

## 2022-01-24 RX ADMIN — TIZANIDINE 4 MG: 4 TABLET ORAL at 09:01

## 2022-01-24 RX ADMIN — PREGABALIN 150 MG: 75 CAPSULE ORAL at 10:01

## 2022-01-24 RX ADMIN — HYDROMORPHONE HYDROCHLORIDE 1 MG: 1 INJECTION, SOLUTION INTRAMUSCULAR; INTRAVENOUS; SUBCUTANEOUS at 12:01

## 2022-01-24 RX ADMIN — ASPIRIN 81 MG: 81 TABLET, COATED ORAL at 10:01

## 2022-01-24 RX ADMIN — ENOXAPARIN SODIUM 40 MG: 100 INJECTION SUBCUTANEOUS at 04:01

## 2022-01-24 RX ADMIN — AMLODIPINE BESYLATE 5 MG: 5 TABLET ORAL at 10:01

## 2022-01-24 RX ADMIN — ATORVASTATIN CALCIUM 40 MG: 20 TABLET, FILM COATED ORAL at 10:01

## 2022-01-24 RX ADMIN — AMPICILLIN SODIUM AND SULBACTAM SODIUM 3 G: 2; 1 INJECTION, POWDER, FOR SOLUTION INTRAMUSCULAR; INTRAVENOUS at 04:01

## 2022-01-24 RX ADMIN — OXYCODONE HYDROCHLORIDE 10 MG: 10 TABLET ORAL at 10:01

## 2022-01-24 RX ADMIN — CITALOPRAM HYDROBROMIDE 20 MG: 20 TABLET ORAL at 10:01

## 2022-01-24 RX ADMIN — ACETAMINOPHEN 1000 MG: 500 TABLET ORAL at 10:01

## 2022-01-24 RX ADMIN — DOCUSATE SODIUM 50 MG AND SENNOSIDES 8.6 MG 1 TABLET: 8.6; 5 TABLET, FILM COATED ORAL at 10:01

## 2022-01-24 RX ADMIN — PREGABALIN 150 MG: 75 CAPSULE ORAL at 09:01
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
## 2022-01-24 NOTE — PLAN OF CARE
Pt pain management , wound care , up ad cuong no falls  Problem: Adult Inpatient Plan of Care  Goal: Plan of Care Review  Outcome: Ongoing, Progressing  Goal: Patient-Specific Goal (Individualized)  Outcome: Ongoing, Progressing  Goal: Absence of Hospital-Acquired Illness or Injury  Outcome: Ongoing, Progressing  Goal: Optimal Comfort and Wellbeing  Outcome: Ongoing, Progressing  Goal: Readiness for Transition of Care  Outcome: Ongoing, Progressing     Problem: Diabetes Comorbidity  Goal: Blood Glucose Level Within Targeted Range  Outcome: Ongoing, Progressing     Problem: Impaired Wound Healing  Goal: Optimal Wound Healing  Outcome: Ongoing, Progressing

## 2022-01-24 NOTE — RESPIRATORY THERAPY
RAPID RESPONSE RESPIRATORY CHART CHECK       Chart check completed,  instructed to call [phone] for further concerns or assistance.

## 2022-01-24 NOTE — NURSING
0732 Pt in bed resting on side A&O x4 resp even and unlabored, no s/s distress/ discomfort, vitals done communication board updated, bed in low locked position, call light in reach    1009 Pt in bed sitting up scheduled meds given, requested Ice provided fresh Ice    1051 Pt to get bedside debridement done, asked for wound vac and supplies to be at bedside, nurse requesting from central supply and equipment distribution, pt in pain prn pill given pt wanting gown and dressing changed, gown changed to study gown    1217 wound vac and supplies at bedside, IV dilaudid given per orders prior to bedside debridement, general surgery doctor and infectious disease doctor at bedside,     1421 Pt noted did not get wound vac placed, reports doctor will send colleague to re evaluate the area may need to be taken to surgery for further treatment prior to wound vac placement, pt reports pain 0/10 now

## 2022-01-24 NOTE — PLAN OF CARE
Discharge Recommendation: HHPT.    Evaluation completed today. PT goals appropriate.    Patient is safe to perform in-room ambulation with CGA and RW with nursing staff.    Please continue Progressive Mobility Protocol as appropriate.    Bianka Meza, PT, DPT  2022  Pager: [phone]      Problem: Physical Therapy Goal  Goal: Physical Therapy Goal  Description: Goals to be met by: 2022     Patient will increase functional independence with mobility by performin. Sit to stand transfer with Modified [place]  2. Bed to chair transfer with Supervision using RW or LRAD  3. Gait  x 150 feet with Supervision using RW or LRAD.   4. Lower extremity exercise program x30 reps per handout, with independence    Outcome: Ongoing, Progressing

## 2022-01-24 NOTE — PLAN OF CARE
Evaluation completed  Problem: Occupational Therapy Goal  Goal: Occupational Therapy Goal  Description: Goals to be met by: 02/24/22    Patient will increase functional independence with ADLs by performing:    Toileting from toilet with [place] for hygiene and clothing management.   Toilet transfer to toilet with [place].    Outcome: Ongoing, Progressing
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
## 2022-01-24 NOTE — RESEARCH
Sponsor: Dr. Michael Dueñas M.D.    Study Title/IRB Number: A computer vision approach to prevention of injury from falling  IRB #: [id number]    Principle Investigator: Michale Dueñas M.D.    Present for Discussion: Yes/Patient only.     Is LAR Consenting for Subject: No    Prior to the Informed Consent (IC) being signed, or any study protocol required data collection, testing, procedure, or intervention being performed, the following was done and/or discussed:   Patient was given a copy of the IC for review    Purpose of the study and qualifications to participate    Study design, follow up schedule, and tests or procedures done at each visit   Confidentiality and HIPAA Authorization for Release of Medical Records for the research trial/ subject's rights/research related injury   Risk, Benefits, Alternative Treatments, Compensation and Costs   Participation in the research trial is voluntary and patient may withdraw at anytime   Contact information for study related questions    Patient verbalizes understanding of the above: Yes  Contact information for CRC and PI given to patient: Yes  Patient able to adequately summarize: the purpose of the study, the risks associated with the study, and all procedures, testing, and follow-ups associated with the study: Yes    Patient signed the informed consent form for the A Computer Vision Approach to Prevent Injury From Falling research study with an IRB approval date of 07/09/2020.  Each page of the consent form was reviewed with patient and all questions answered satisfactorily. Patient signed the consent form and received a copy of same. The original consent was scanned into electronic medical records (EPIC) and filed into the subject's research study chart.    Ms. Turner was able to complete the following upon entry of the study:    - Subject was outfitted with white and black checkered gown: Yes  - Subject understands that they must wear the white and black  checkered gown for the entirety of the 24 hour observation period: Yes   - Subject understands that visitors and staff will also be recorded while in the view of visual recording equipment: Yes  - Subject was able to read consent and understands that they'll only be visually recorded for 24 hours and not audio recorded: Yes  -Subject states that they understand that this is an investigational study and that the WOW (Workstation on Wheels) doesn't prevent or lower risks of falls or injuries, and that they should always follow their provider's orders and use their call bell to alert hospital staff before ambulating or becoming mobile: Yes    Dr. Michael Dueñas M.D. has reviewed the following inclusion/ exclusion criteria and confirms that subject meets all Inclusion and no Exclusion criteria at this time:      Inclusion-   - Subject is currently admitted as an inpatient with acute care needs to [facility] and is at risk for falling.  - Subject is awake, alert, oriented and can speak and understand English without        difficulty.  - Subject can stand and ambulate with or without assistance.  - Subject must be literate.  - Subject must have a BMI that allows for proper fit of white and black      checkered gown.  - Subject must be able to provide informed consent.  - Subject will be admitted for > 24 hours.    Exclusion-  - Subject cannot read.  - Subject is immobile e.g. (paralyzed)  - Subject has BMI that prevents them from properly fitting in white and black checkered      gown.  - Subject has COVID-19 or other airborne infectious diseases.  - Subject is on reverse-isolation for immunosuppressive diseases.  - Subject has altered mental status or diagnosis of dementia.  - Subject is expected to be discharged in < 24 hours.    Pt AAO x 4, lying quietly in bed with HOB elevated X 75 degrees. Respirations even and unlabored without distress noted. Pt c/o discomfort to anterior  distal thigh. SHAINA Cruz notified of discomfort. Patient safely placed in gown without incident. Pt reminded that WOW and video recording is not monitored and provides no emergency benefits or reduction or elimination from the risk of injury from falls; pt voiced understanding. Pt reminded to follow plan of care put forth by provider and to call RN for all assistance to reduce risk of problematic situations and potential injuries; pt voiced understanding. Recording started without incident. Advised pt that their participation is voluntary and if for any reasons they decide to cease the study that they only needed to inform their nurse and study session would be stopped, pt voiced understanding; Call beard within reach. SHAINA Cruz notified of continuous video monitoring and of other study antecedents.
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
## 2022-01-24 NOTE — SUBJECTIVE & OBJECTIVE
Interval History:   Bedside debridement with gen surgery today. No purulent drainage. Necrotic tissue excised. Hematoma removed. May need further debridement    Review of Systems   Constitutional: Negative for appetite change, chills, diaphoresis, fatigue, fever and unexpected weight change.   HENT: Negative for congestion, ear pain and hearing loss.    Respiratory: Negative for cough, chest tightness, shortness of breath and wheezing.    Cardiovascular: Negative for chest pain.   Gastrointestinal: Negative for abdominal pain, constipation, diarrhea, nausea and vomiting.   Genitourinary: Negative for decreased urine volume, difficulty urinating, dysuria, flank pain, frequency, hematuria and urgency.   Musculoskeletal: Negative for arthralgias, back pain and myalgias.   Skin: Positive for wound. Negative for rash.   Neurological: Negative for dizziness, facial asymmetry and headaches.   Psychiatric/Behavioral: Positive for decreased concentration and dysphoric mood. Negative for behavioral problems and confusion.     Objective:     Vital Signs (Most Recent):  Temp: 98.2 °F (36.8 °C) (01/24/22 1100)  Pulse: 81 (01/24/22 1100)  Resp: 16 (01/24/22 1216)  BP: (!) 141/99 (01/24/22 1100)  SpO2: (!) 93 % (01/24/22 1100) Vital Signs (24h Range):  Temp:  [97 °F (36.1 °C)-98.2 °F (36.8 °C)] 98.2 °F (36.8 °C)  Pulse:  [58-89] 81  Resp:  [16-18] 16  SpO2:  [93 %-98 %] 93 %  BP: ()/(6-99) 141/99     Weight: 96.4 kg (212 lb 8.4 oz)  Body mass index is 34.3 kg/m².    Estimated Creatinine Clearance: 138.5 mL/min (based on SCr of 0.6 mg/dL).    Physical Exam  Vitals and nursing note reviewed.   Constitutional:       General: She is not in acute distress.     Appearance: She is well-developed. She is not diaphoretic.   HENT:      Head: Normocephalic and atraumatic.   Eyes:      Pupils: Pupils are equal, round, and reactive to light.   Cardiovascular:      Rate and Rhythm: Normal rate and regular rhythm.      Heart sounds: Normal  heart sounds. No murmur heard.  No friction rub. No gallop.    Pulmonary:      Effort: Pulmonary effort is normal. No respiratory distress.      Breath sounds: Normal breath sounds. No wheezing or rales.   Chest:      Chest wall: No tenderness.   Abdominal:      General: Bowel sounds are normal. There is no distension.      Palpations: Abdomen is soft. There is no mass.      Tenderness: There is no abdominal tenderness. There is no guarding or rebound.      Hernia: No hernia is present.   Musculoskeletal:         General: Swelling, tenderness and signs of injury present. No deformity. Normal range of motion.      Cervical back: Normal range of motion and neck supple.      Comments: Necrotic tissue    Skin:     General: Skin is warm and dry.      Coloration: Skin is not pale.      Findings: No erythema.      Comments: Right medial thigh wound with serous drainage. Tender    Neurological:      Mental Status: She is alert and oriented to person, place, and time.      Cranial Nerves: No cranial nerve deficit.      Coordination: Coordination normal.   Psychiatric:         Thought Content: Thought content normal.             Significant Labs: All pertinent labs within the past 24 hours have been reviewed.    Significant Imaging: I have reviewed all pertinent imaging results/findings within the past 24 hours.

## 2022-01-24 NOTE — PLAN OF CARE
Problem: Adult Inpatient Plan of Care  Goal: Plan of Care Review  Outcome: Ongoing, Progressing  Goal: Patient-Specific Goal (Individualized)  Outcome: Ongoing, Progressing  Goal: Absence of Hospital-Acquired Illness or Injury  Outcome: Ongoing, Progressing  Goal: Optimal Comfort and Wellbeing  Outcome: Ongoing, Progressing     Problem: Adult Inpatient Plan of Care  Goal: Absence of Hospital-Acquired Illness or Injury  Outcome: Ongoing, Progressing
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
## 2022-01-25 LAB
ANION GAP SERPL CALC-SCNC: 10 MMOL/L (ref 8–16)
BASOPHILS # BLD AUTO: 0.02 K/UL (ref 0–0.2)
BASOPHILS NFR BLD: 0.3 % (ref 0–1.9)
BUN SERPL-MCNC: 6 MG/DL (ref 6–20)
CALCIUM SERPL-MCNC: 8.6 MG/DL (ref 8.7–10.5)
CHLORIDE SERPL-SCNC: 103 MMOL/L (ref 95–110)
CO2 SERPL-SCNC: 24 MMOL/L (ref 23–29)
CREAT SERPL-MCNC: 0.7 MG/DL (ref 0.5–1.4)
DIFFERENTIAL METHOD: ABNORMAL
EOSINOPHIL # BLD AUTO: 0.4 K/UL (ref 0–0.5)
EOSINOPHIL NFR BLD: 5.3 % (ref 0–8)
ERYTHROCYTE [DISTWIDTH] IN BLOOD BY AUTOMATED COUNT: 12.9 % (ref 11.5–14.5)
EST. GFR  (AFRICAN AMERICAN): >60 ML/MIN/1.73 M^2
EST. GFR  (NON AFRICAN AMERICAN): >60 ML/MIN/1.73 M^2
GLUCOSE SERPL-MCNC: 98 MG/DL (ref 70–110)
HCT VFR BLD AUTO: 28.3 % (ref 37–48.5)
HGB BLD-MCNC: 8.8 G/DL (ref 12–16)
IMM GRANULOCYTES # BLD AUTO: 0.03 K/UL (ref 0–0.04)
IMM GRANULOCYTES NFR BLD AUTO: 0.4 % (ref 0–0.5)
LYMPHOCYTES # BLD AUTO: 2.3 K/UL (ref 1–4.8)
LYMPHOCYTES NFR BLD: 31.2 % (ref 18–48)
MCH RBC QN AUTO: 27.6 PG (ref 27–31)
MCHC RBC AUTO-ENTMCNC: 31.1 G/DL (ref 32–36)
MCV RBC AUTO: 89 FL (ref 82–98)
MONOCYTES # BLD AUTO: 0.6 K/UL (ref 0.3–1)
MONOCYTES NFR BLD: 7.6 % (ref 4–15)
NEUTROPHILS # BLD AUTO: 4.1 K/UL (ref 1.8–7.7)
NEUTROPHILS NFR BLD: 55.2 % (ref 38–73)
NRBC BLD-RTO: 0 /100 WBC
PLATELET # BLD AUTO: 451 K/UL (ref 150–450)
PMV BLD AUTO: 10.7 FL (ref 9.2–12.9)
POCT GLUCOSE: 103 MG/DL (ref 70–110)
POCT GLUCOSE: 104 MG/DL (ref 70–110)
POCT GLUCOSE: 155 MG/DL (ref 70–110)
POCT GLUCOSE: 156 MG/DL (ref 70–110)
POTASSIUM SERPL-SCNC: 3.7 MMOL/L (ref 3.5–5.1)
RBC # BLD AUTO: 3.19 M/UL (ref 4–5.4)
SODIUM SERPL-SCNC: 137 MMOL/L (ref 136–145)
VANCOMYCIN TROUGH SERPL-MCNC: 8.2 UG/ML (ref 10–22)
WBC # BLD AUTO: 7.38 K/UL (ref 3.9–12.7)

## 2022-01-25 PROCEDURE — 99233 SBSQ HOSP IP/OBS HIGH 50: CPT | Mod: ,,, | Performed by: PHYSICIAN ASSISTANT

## 2022-01-25 PROCEDURE — 99231 SBSQ HOSP IP/OBS SF/LOW 25: CPT | Mod: ,,, | Performed by: SURGERY

## 2022-01-25 PROCEDURE — 99233 PR SUBSEQUENT HOSPITAL CARE,LEVL III: ICD-10-PCS | Mod: ,,, | Performed by: PHYSICIAN ASSISTANT

## 2022-01-25 PROCEDURE — 36415 COLL VENOUS BLD VENIPUNCTURE: CPT | Performed by: PHYSICIAN ASSISTANT

## 2022-01-25 PROCEDURE — 80048 BASIC METABOLIC PNL TOTAL CA: CPT | Performed by: PHYSICIAN ASSISTANT

## 2022-01-25 PROCEDURE — 99231 PR SUBSEQUENT HOSPITAL CARE,LEVL I: ICD-10-PCS | Mod: ,,, | Performed by: SURGERY

## 2022-01-25 PROCEDURE — 25000003 PHARM REV CODE 250: Performed by: INTERNAL MEDICINE

## 2022-01-25 PROCEDURE — 25000003 PHARM REV CODE 250: Performed by: HOSPITALIST

## 2022-01-25 PROCEDURE — 11000001 HC ACUTE MED/SURG PRIVATE ROOM

## 2022-01-25 PROCEDURE — 63600175 PHARM REV CODE 636 W HCPCS: Performed by: PHYSICIAN ASSISTANT

## 2022-01-25 PROCEDURE — 63600175 PHARM REV CODE 636 W HCPCS: Performed by: INTERNAL MEDICINE

## 2022-01-25 PROCEDURE — 85025 COMPLETE CBC W/AUTO DIFF WBC: CPT | Performed by: PHYSICIAN ASSISTANT

## 2022-01-25 PROCEDURE — 25000003 PHARM REV CODE 250: Performed by: PHYSICIAN ASSISTANT

## 2022-01-25 PROCEDURE — 63600175 PHARM REV CODE 636 W HCPCS: Performed by: HOSPITALIST

## 2022-01-25 RX ADMIN — ACETAMINOPHEN 1000 MG: 500 TABLET ORAL at 03:01

## 2022-01-25 RX ADMIN — CITALOPRAM HYDROBROMIDE 20 MG: 20 TABLET ORAL at 09:01

## 2022-01-25 RX ADMIN — ENOXAPARIN SODIUM 40 MG: 100 INJECTION SUBCUTANEOUS at 04:01

## 2022-01-25 RX ADMIN — AMPICILLIN SODIUM AND SULBACTAM SODIUM 3 G: 2; 1 INJECTION, POWDER, FOR SOLUTION INTRAMUSCULAR; INTRAVENOUS at 03:01

## 2022-01-25 RX ADMIN — ACETAMINOPHEN 1000 MG: 500 TABLET ORAL at 09:01

## 2022-01-25 RX ADMIN — TIZANIDINE 4 MG: 4 TABLET ORAL at 08:01

## 2022-01-25 RX ADMIN — AMITRIPTYLINE HYDROCHLORIDE 50 MG: 50 TABLET, FILM COATED ORAL at 08:01

## 2022-01-25 RX ADMIN — PREGABALIN 150 MG: 75 CAPSULE ORAL at 09:01

## 2022-01-25 RX ADMIN — VANCOMYCIN HYDROCHLORIDE 1250 MG: 1.25 INJECTION, POWDER, LYOPHILIZED, FOR SOLUTION INTRAVENOUS at 09:01

## 2022-01-25 RX ADMIN — ATORVASTATIN CALCIUM 40 MG: 20 TABLET, FILM COATED ORAL at 09:01

## 2022-01-25 RX ADMIN — ACETAMINOPHEN 1000 MG: 500 TABLET ORAL at 08:01

## 2022-01-25 RX ADMIN — OXYCODONE HYDROCHLORIDE 10 MG: 10 TABLET ORAL at 09:01

## 2022-01-25 RX ADMIN — DOCUSATE SODIUM 50 MG AND SENNOSIDES 8.6 MG 1 TABLET: 8.6; 5 TABLET, FILM COATED ORAL at 09:01

## 2022-01-25 RX ADMIN — OXYCODONE HYDROCHLORIDE 10 MG: 10 TABLET ORAL at 03:01

## 2022-01-25 RX ADMIN — AMLODIPINE BESYLATE 5 MG: 5 TABLET ORAL at 09:01

## 2022-01-25 RX ADMIN — VANCOMYCIN HYDROCHLORIDE 1250 MG: 1.25 INJECTION, POWDER, LYOPHILIZED, FOR SOLUTION INTRAVENOUS at 05:01

## 2022-01-25 RX ADMIN — VANCOMYCIN HYDROCHLORIDE 1750 MG: 500 INJECTION, POWDER, LYOPHILIZED, FOR SOLUTION INTRAVENOUS at 12:01

## 2022-01-25 RX ADMIN — DOCUSATE SODIUM 50 MG AND SENNOSIDES 8.6 MG 1 TABLET: 8.6; 5 TABLET, FILM COATED ORAL at 08:01

## 2022-01-25 RX ADMIN — ASPIRIN 81 MG: 81 TABLET, COATED ORAL at 09:01

## 2022-01-25 RX ADMIN — LOSARTAN POTASSIUM 25 MG: 25 TABLET, FILM COATED ORAL at 09:01

## 2022-01-25 RX ADMIN — AMPICILLIN SODIUM AND SULBACTAM SODIUM 3 G: 2; 1 INJECTION, POWDER, FOR SOLUTION INTRAMUSCULAR; INTRAVENOUS at 05:01

## 2022-01-25 RX ADMIN — PREGABALIN 150 MG: 75 CAPSULE ORAL at 08:01

## 2022-01-25 RX ADMIN — AMPICILLIN SODIUM AND SULBACTAM SODIUM 3 G: 2; 1 INJECTION, POWDER, FOR SOLUTION INTRAMUSCULAR; INTRAVENOUS at 11:01

## 2022-01-25 RX ADMIN — AMPICILLIN SODIUM AND SULBACTAM SODIUM 3 G: 2; 1 INJECTION, POWDER, FOR SOLUTION INTRAMUSCULAR; INTRAVENOUS at 10:01

## 2022-01-25 RX ADMIN — MELOXICAM 15 MG: 15 TABLET ORAL at 09:01
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
## 2022-01-25 NOTE — PROGRESS NOTES
Pharmacokinetic Assessment Follow Up: IV Vancomycin    Vancomycin serum concentration assessment(s):    The trough level was drawn correctly and can be used to guide therapy at this time. The measurement is below the desired definitive target range of 15 to 20 mcg/mL.    Vancomycin Regimen Plan:    Change regimen to Vancomycin 1250 mg IV every 8 hours with next serum trough concentration measured with AM labs prior to 4th dose on 1/26    Drug levels (last 3 results):  Recent Labs   Lab Result Units 01/23/22  0830 01/24/22  2332   Vancomycin-Trough ug/mL 8.8* 8.2*       Pharmacy will continue to follow and monitor vancomycin.    Please contact pharmacy at extension [phone] for questions regarding this assessment.    Thank you for the consult,   Iva Armenta       Patient brief summary:  Juan Carlos Turner is a 46 y.o. female initiated on antimicrobial therapy with IV Vancomycin for treatment of worsening necrotic wound infection requiring debridement      Drug Allergies:   Review of patient's allergies indicates:  No Known Allergies    Actual Body Weight:   96.4 kg    Renal Function:   Estimated Creatinine Clearance: 138.5 mL/min (based on SCr of 0.6 mg/dL).     Dialysis Method (if applicable):  N/A    CBC (last 72 hours):  Recent Labs   Lab Result Units 01/23/22  0830   WBC K/uL 7.59   Hemoglobin g/dL 8.3*   Hematocrit % 27.3*   Platelets K/uL 413   Gran % % 62.0   Lymph % % 26.9   Mono % % 5.5   Eosinophil % % 4.7   Basophil % % 0.4   Differential Method  Automated       Metabolic Panel (last 72 hours):  Recent Labs   Lab Result Units 01/23/22  0830   Sodium mmol/L 138   Potassium mmol/L 3.6   Chloride mmol/L 104   CO2 mmol/L 26   Glucose mg/dL 136*   BUN mg/dL 6   Creatinine mg/dL 0.6       Vancomycin Administrations:  vancomycin given in the last 96 hours                   vancomycin 1.75 g in 5 % dextrose 500 mL IVPB (mg) 1,750 mg New Bag 01/25/22 0010     1,750 mg New Bag 01/24/22 1217     1,750 mg New Bag   0018    vancomycin (VANCOCIN) 250 mg in dextrose 5 % 100 mL IVPB (mg) 250 mg New Bag 01/23/22 1149    vancomycin 1.5 g in dextrose 5 % 250 mL IVPB (ready to mix) (mg) 1,500 mg New Bag 01/23/22 0900     1,500 mg New Bag 01/22/22 2141     1,500 mg New Bag  0854     1,500 mg New Bag 01/21/22 2044    vancomycin 1.25 g in dextrose 5% 250 mL IVPB (ready to mix) (mg) 1,250 mg New Bag 01/21/22 0941                Microbiologic Results:  Microbiology Results (last 7 days)     Procedure Component Value Units Date/Time    Blood culture #2 **CANNOT BE ORDERED STAT** [[id number]] Collected: 01/19/22 2025    Order Status: Completed Specimen: Blood from Peripheral, Antecubital, Right Updated: 01/24/22 2212     Blood Culture, Routine No growth after 5 days.    Blood culture #1 **CANNOT BE ORDERED STAT** [[id number]] Collected: 01/19/22 2014    Order Status: Completed Specimen: Blood from Peripheral, Antecubital, Left Updated: 01/24/22 2212     Blood Culture, Routine No growth after 5 days.    Culture, Anaerobe [[id number]]     Order Status: No result Specimen: Wound     Aerobic culture [[id number]]     Order Status: No result Specimen: Wound

## 2022-01-25 NOTE — PLAN OF CARE
Problem: Adult Inpatient Plan of Care  Goal: Plan of Care Review  Outcome: Ongoing, Progressing  Goal: Patient-Specific Goal (Individualized)  Outcome: Ongoing, Progressing  Goal: Absence of Hospital-Acquired Illness or Injury  Outcome: Ongoing, Progressing  Goal: Optimal Comfort and Wellbeing  Outcome: Ongoing, Progressing  Goal: Readiness for Transition of Care  Outcome: Ongoing, Progressing     Problem: Adult Inpatient Plan of Care  Goal: Plan of Care Review  Outcome: Ongoing, Progressing     Problem: Adult Inpatient Plan of Care  Goal: Patient-Specific Goal (Individualized)  Outcome: Ongoing, Progressing     Problem: Adult Inpatient Plan of Care  Goal: Absence of Hospital-Acquired Illness or Injury  Outcome: Ongoing, Progressing     Problem: Adult Inpatient Plan of Care  Goal: Optimal Comfort and Wellbeing  Outcome: Ongoing, Progressing     Problem: Adult Inpatient Plan of Care  Goal: Readiness for Transition of Care  Outcome: Ongoing, Progressing
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
## 2022-01-25 NOTE — PLAN OF CARE
Pt wound vac applied, pain management, up ad cuong, no falls  Problem: Adult Inpatient Plan of Care  Goal: Plan of Care Review  Outcome: Ongoing, Progressing  Goal: Patient-Specific Goal (Individualized)  Outcome: Ongoing, Progressing  Goal: Absence of Hospital-Acquired Illness or Injury  Outcome: Ongoing, Progressing  Goal: Optimal Comfort and Wellbeing  Outcome: Ongoing, Progressing  Goal: Readiness for Transition of Care  Outcome: Ongoing, Progressing     Problem: Diabetes Comorbidity  Goal: Blood Glucose Level Within Targeted Range  Outcome: Ongoing, Progressing     Problem: Impaired Wound Healing  Goal: Optimal Wound Healing  Outcome: Ongoing, Progressing

## 2022-01-25 NOTE — SUBJECTIVE & OBJECTIVE
Interval History:   1/24 Bedside debridement with gen surgery . No purulent drainage. Necrotic tissue excised. Hematoma removed.     1/25 seen by general surgery. Wound vac placement this afternoon.       Review of Systems   Constitutional: Negative for appetite change, chills, diaphoresis, fatigue, fever and unexpected weight change.   HENT: Negative for congestion, ear pain and hearing loss.    Respiratory: Negative for cough, chest tightness, shortness of breath and wheezing.    Cardiovascular: Negative for chest pain.   Gastrointestinal: Negative for abdominal pain, constipation, diarrhea, nausea and vomiting.   Genitourinary: Negative for decreased urine volume, difficulty urinating, dysuria, flank pain, frequency, hematuria and urgency.   Musculoskeletal: Negative for arthralgias, back pain and myalgias.   Skin: Positive for wound. Negative for rash.   Neurological: Negative for dizziness, facial asymmetry and headaches.   Psychiatric/Behavioral: Positive for decreased concentration and dysphoric mood. Negative for behavioral problems and confusion.     Objective:     Vital Signs (Most Recent):  Temp: 98.1 °F (36.7 °C) (01/25/22 1113)  Pulse: 95 (01/25/22 1113)  Resp: 16 (01/25/22 1113)  BP: (!) 151/86 (01/25/22 1113)  SpO2: 98 % (01/25/22 1113) Vital Signs (24h Range):  Temp:  [96.2 °F (35.7 °C)-98.1 °F (36.7 °C)] 98.1 °F (36.7 °C)  Pulse:  [60-95] 95  Resp:  [16-18] 16  SpO2:  [92 %-98 %] 98 %  BP: ()/(57-96) 151/86     Weight: 96.4 kg (212 lb 8.4 oz)  Body mass index is 34.3 kg/m².    Estimated Creatinine Clearance: 118.7 mL/min (based on SCr of 0.7 mg/dL).    Physical Exam  Vitals and nursing note reviewed.   Constitutional:       General: She is not in acute distress.     Appearance: She is well-developed. She is not diaphoretic.   HENT:      Head: Normocephalic and atraumatic.   Eyes:      Pupils: Pupils are equal, round, and reactive to light.   Cardiovascular:      Rate and Rhythm: Normal rate  and regular rhythm.      Heart sounds: Normal heart sounds. No murmur heard.  No friction rub. No gallop.    Pulmonary:      Effort: Pulmonary effort is normal. No respiratory distress.      Breath sounds: Normal breath sounds. No wheezing or rales.   Chest:      Chest wall: No tenderness.   Abdominal:      General: Bowel sounds are normal. There is no distension.      Palpations: Abdomen is soft. There is no mass.      Tenderness: There is no abdominal tenderness. There is no guarding or rebound.      Hernia: No hernia is present.   Musculoskeletal:         General: Swelling, tenderness and signs of injury present. No deformity. Normal range of motion.      Cervical back: Normal range of motion and neck supple.      Comments: Necrotic tissue    Skin:     General: Skin is warm and dry.      Coloration: Skin is not pale.      Findings: No erythema.      Comments: Right medial thigh wound with serous drainage. Tender    Neurological:      Mental Status: She is alert and oriented to person, place, and time.      Cranial Nerves: No cranial nerve deficit.      Coordination: Coordination normal.   Psychiatric:         Thought Content: Thought content normal.             Significant Labs: All pertinent labs within the past 24 hours have been reviewed.    Significant Imaging: I have reviewed all pertinent imaging results/findings within the past 24 hours.

## 2022-01-26 PROBLEM — F33.41 MAJOR DEPRESSIVE DISORDER, RECURRENT EPISODE, IN PARTIAL REMISSION: Status: ACTIVE | Noted: 2022-01-26

## 2022-01-26 PROBLEM — S71.101A OPEN WOUND OF RIGHT THIGH: Status: ACTIVE | Noted: 2022-01-26

## 2022-01-26 LAB
POCT GLUCOSE: 140 MG/DL (ref 70–110)
POCT GLUCOSE: 161 MG/DL (ref 70–110)
POCT GLUCOSE: 83 MG/DL (ref 70–110)
POCT GLUCOSE: 97 MG/DL (ref 70–110)
VANCOMYCIN TROUGH SERPL-MCNC: 15 UG/ML (ref 10–22)

## 2022-01-26 PROCEDURE — 99233 SBSQ HOSP IP/OBS HIGH 50: CPT | Mod: ,,, | Performed by: PHYSICIAN ASSISTANT

## 2022-01-26 PROCEDURE — 63600175 PHARM REV CODE 636 W HCPCS: Performed by: INTERNAL MEDICINE

## 2022-01-26 PROCEDURE — 99233 PR SUBSEQUENT HOSPITAL CARE,LEVL III: ICD-10-PCS | Mod: ,,, | Performed by: PHYSICIAN ASSISTANT

## 2022-01-26 PROCEDURE — 25000003 PHARM REV CODE 250: Performed by: PHYSICIAN ASSISTANT

## 2022-01-26 PROCEDURE — 63600175 PHARM REV CODE 636 W HCPCS: Performed by: HOSPITALIST

## 2022-01-26 PROCEDURE — 25000003 PHARM REV CODE 250: Performed by: HOSPITALIST

## 2022-01-26 PROCEDURE — 97535 SELF CARE MNGMENT TRAINING: CPT

## 2022-01-26 PROCEDURE — 97530 THERAPEUTIC ACTIVITIES: CPT | Mod: CQ

## 2022-01-26 PROCEDURE — 80202 ASSAY OF VANCOMYCIN: CPT | Performed by: INTERNAL MEDICINE

## 2022-01-26 PROCEDURE — 11000001 HC ACUTE MED/SURG PRIVATE ROOM

## 2022-01-26 PROCEDURE — 90791 PSYCH DIAGNOSTIC EVALUATION: CPT | Mod: ,,, | Performed by: STUDENT IN AN ORGANIZED HEALTH CARE EDUCATION/TRAINING PROGRAM

## 2022-01-26 PROCEDURE — 36415 COLL VENOUS BLD VENIPUNCTURE: CPT | Performed by: INTERNAL MEDICINE

## 2022-01-26 PROCEDURE — 25000003 PHARM REV CODE 250: Performed by: INTERNAL MEDICINE

## 2022-01-26 PROCEDURE — 63600175 PHARM REV CODE 636 W HCPCS: Performed by: PHYSICIAN ASSISTANT

## 2022-01-26 PROCEDURE — 90791 PR PSYCHIATRIC DIAGNOSTIC EVALUATION: ICD-10-PCS | Mod: ,,, | Performed by: STUDENT IN AN ORGANIZED HEALTH CARE EDUCATION/TRAINING PROGRAM

## 2022-01-26 RX ORDER — AMOXICILLIN AND CLAVULANATE POTASSIUM 875; 125 MG/1; MG/1
1 TABLET, FILM COATED ORAL EVERY 12 HOURS
Status: DISCONTINUED | OUTPATIENT
Start: 2022-01-26 | End: 2022-01-28 | Stop reason: HOSPADM

## 2022-01-26 RX ORDER — DOXYCYCLINE HYCLATE 100 MG
100 TABLET ORAL EVERY 12 HOURS
Status: DISCONTINUED | OUTPATIENT
Start: 2022-01-26 | End: 2022-01-28 | Stop reason: HOSPADM

## 2022-01-26 RX ADMIN — ASPIRIN 81 MG: 81 TABLET, COATED ORAL at 08:01

## 2022-01-26 RX ADMIN — PREGABALIN 150 MG: 75 CAPSULE ORAL at 08:01

## 2022-01-26 RX ADMIN — OXYCODONE HYDROCHLORIDE 10 MG: 10 TABLET ORAL at 05:01

## 2022-01-26 RX ADMIN — AMPICILLIN SODIUM AND SULBACTAM SODIUM 3 G: 2; 1 INJECTION, POWDER, FOR SOLUTION INTRAMUSCULAR; INTRAVENOUS at 10:01

## 2022-01-26 RX ADMIN — DOXYCYCLINE HYCLATE 100 MG: 100 TABLET, COATED ORAL at 08:01

## 2022-01-26 RX ADMIN — ACETAMINOPHEN 1000 MG: 500 TABLET ORAL at 08:01

## 2022-01-26 RX ADMIN — AMITRIPTYLINE HYDROCHLORIDE 50 MG: 50 TABLET, FILM COATED ORAL at 08:01

## 2022-01-26 RX ADMIN — ENOXAPARIN SODIUM 40 MG: 100 INJECTION SUBCUTANEOUS at 03:01

## 2022-01-26 RX ADMIN — CITALOPRAM HYDROBROMIDE 20 MG: 20 TABLET ORAL at 08:01

## 2022-01-26 RX ADMIN — TIZANIDINE 4 MG: 4 TABLET ORAL at 08:01

## 2022-01-26 RX ADMIN — VANCOMYCIN HYDROCHLORIDE 1250 MG: 1.25 INJECTION, POWDER, LYOPHILIZED, FOR SOLUTION INTRAVENOUS at 12:01

## 2022-01-26 RX ADMIN — AMPICILLIN SODIUM AND SULBACTAM SODIUM 3 G: 2; 1 INJECTION, POWDER, FOR SOLUTION INTRAMUSCULAR; INTRAVENOUS at 04:01

## 2022-01-26 RX ADMIN — VANCOMYCIN HYDROCHLORIDE 1250 MG: 1.25 INJECTION, POWDER, LYOPHILIZED, FOR SOLUTION INTRAVENOUS at 09:01

## 2022-01-26 RX ADMIN — AMLODIPINE BESYLATE 5 MG: 5 TABLET ORAL at 08:01

## 2022-01-26 RX ADMIN — ATORVASTATIN CALCIUM 40 MG: 20 TABLET, FILM COATED ORAL at 08:01

## 2022-01-26 RX ADMIN — Medication 1 CAPSULE: at 02:01

## 2022-01-26 RX ADMIN — DOCUSATE SODIUM 50 MG AND SENNOSIDES 8.6 MG 1 TABLET: 8.6; 5 TABLET, FILM COATED ORAL at 08:01

## 2022-01-26 RX ADMIN — LOSARTAN POTASSIUM 25 MG: 25 TABLET, FILM COATED ORAL at 08:01

## 2022-01-26 RX ADMIN — AMOXICILLIN AND CLAVULANATE POTASSIUM 1 TABLET: 875; 125 TABLET, FILM COATED ORAL at 08:01

## 2022-01-26 RX ADMIN — OXYCODONE HYDROCHLORIDE 10 MG: 10 TABLET ORAL at 08:01

## 2022-01-26 RX ADMIN — MELOXICAM 15 MG: 15 TABLET ORAL at 08:01

## 2022-01-26 RX ADMIN — ACETAMINOPHEN 1000 MG: 500 TABLET ORAL at 02:01

## 2022-01-26 NOTE — SUBJECTIVE & OBJECTIVE
Interval History: Patient tearful on exam and reports depressed mood. Psychology to see her this afternoon. Transitioned to doxy and augmentin. Wound care to change vac on Friday.     Review of Systems   Constitutional: Negative for chills and fever.   HENT: Negative for congestion and sinus pressure.    Eyes: Negative for photophobia and visual disturbance.   Respiratory: Negative for cough and shortness of breath.    Cardiovascular: Negative for chest pain and leg swelling.   Gastrointestinal: Negative for abdominal pain, nausea and vomiting.   Endocrine: Negative for polydipsia and polyuria.   Genitourinary: Negative for dysuria and hematuria.   Musculoskeletal: Negative for gait problem and myalgias.   Skin: Positive for wound. Negative for pallor.   Neurological: Negative for dizziness and syncope.   Psychiatric/Behavioral: Positive for dysphoric mood. Negative for agitation and behavioral problems.     Objective:     Vital Signs (Most Recent):  Temp: 98.2 °F (36.8 °C) (01/26/22 1100)  Pulse: 74 (01/26/22 1100)  Resp: 16 (01/26/22 1100)  BP: 134/88 (01/26/22 1100)  SpO2: 98 % (01/26/22 1100) Vital Signs (24h Range):  Temp:  [97.8 °F (36.6 °C)-98.4 °F (36.9 °C)] 98.2 °F (36.8 °C)  Pulse:  [58-81] 74  Resp:  [16-18] 16  SpO2:  [96 %-100 %] 98 %  BP: (104-137)/(70-93) 134/88     Weight: 96.4 kg (212 lb 8.4 oz)  Body mass index is 34.3 kg/m².  No intake or output data in the 24 hours ending 01/26/22 1351   Physical Exam  Vitals and nursing note reviewed.   Constitutional:       Appearance: Normal appearance. She is not toxic-appearing.   HENT:      Head: Normocephalic and atraumatic.      Mouth/Throat:      Mouth: Mucous membranes are moist.      Pharynx: Oropharynx is clear.   Eyes:      General: No scleral icterus.     Extraocular Movements: Extraocular movements intact.      Conjunctiva/sclera: Conjunctivae normal.      Pupils: Pupils are equal, round, and reactive to light.   Cardiovascular:      Rate and  Rhythm: Normal rate and regular rhythm.      Pulses: Normal pulses.      Heart sounds: Normal heart sounds.   Pulmonary:      Effort: Pulmonary effort is normal.      Breath sounds: Normal breath sounds.   Abdominal:      General: Bowel sounds are normal.      Palpations: Abdomen is soft.   Musculoskeletal:         General: Tenderness and signs of injury present.      Cervical back: Normal range of motion. No rigidity.   Lymphadenopathy:      Cervical: No cervical adenopathy.   Skin:     General: Skin is warm and dry.      Findings: Bruising (large hematoma improving) present.      Comments: Wound vac in place on right thigh   Neurological:      Mental Status: She is alert and oriented to person, place, and time.      Cranial Nerves: No cranial nerve deficit.   Psychiatric:         Mood and Affect: Mood is depressed. Affect is tearful.         Behavior: Behavior normal.         Significant Labs:   All pertinent labs within the past 24 hours have been reviewed.  Blood Culture: No results for input(s): LABBLOO in the last 48 hours.  CBC:   Recent Labs   Lab 01/25/22  0902   WBC 7.38   HGB 8.8*   HCT 28.3*   *     CMP:   Recent Labs   Lab 01/25/22  0902      K 3.7      CO2 24   GLU 98   BUN 6   CREATININE 0.7   CALCIUM 8.6*   ANIONGAP 10   EGFRNONAA >60.0       Significant Imaging: I have reviewed all pertinent imaging results/findings within the past 24 hours.
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
## 2022-01-26 NOTE — PT/OT/SLP PROGRESS
"Occupational Therapy   Treatment    Name: Juan Carlos Turner  MRN: [id number]  Admitting Diagnosis:  Post-traumatic wound infection       Recommendations:     Discharge Recommendations: home  Discharge Equipment Recommendations:  none  Barriers to discharge:  None    Assessment:     Juan Carlos Turner is a 46 y.o. female with a medical diagnosis of Post-traumatic wound infection.  She presents tearful, stating one of her daughters is on her way in. Performance deficits affecting function are impaired functional mobilty,impaired endurance,pain,impaired skin.   The patient completed bed mobilty with no assist, SBA to manage line for functional mobility and transfer into bathroom.   Rehab Prognosis:  Good; patient would benefit from acute skilled OT services to address these deficits and reach maximum level of function.       Plan:     Patient to be seen 3 x/week to address the above listed problems via self-care/home management,therapeutic activities,therapeutic exercises  · Plan of Care Expires: 02/24/22  · Plan of Care Reviewed with: patient    Subjective   "My leg hurts."  Pain/Comfort:  · Pain Rating 1: 6/10  · Location - Side 1: Right  · Location 1: leg  · Pain Addressed 1: Pre-medicate for activity,Reposition  · Pain Rating Post-Intervention 1: 3/10    Objective:     Communicated with: RN prior to session.  Patient found HOB elevated with wound vac,peripheral IV upon OT entry to room.    General Precautions: Standard, fall   Orthopedic Precautions:N/A   Braces: N/A  Respiratory Status: Room air     Occupational Performance:     Bed Mobility:    · Patient completed Rolling/Turning to Right with independence  · Patient completed Supine to Sit with independence  · Patient completed Sit to Supine with independence     Functional Mobility/Transfers:  · Patient completed Sit <> Stand Transfer with independence  with  no assistive device   · Patient completed Toilet Transfer Step Transfer technique with stand by " assistance with  no AD  · Functional Mobility: in room limp on RLE, declined use of RW, assist to manage lines    Activities of Daily Living:  · Toileting: independence toilet      [facility] 6 Click ADL: 23    Treatment & Education:  Education on fall prevention.     Patient left HOB elevated with all lines intact and call button in reachEducation:      GOALS:   Multidisciplinary Problems     Occupational Therapy Goals        Problem: Occupational Therapy Goal    Goal Priority Disciplines Outcome Interventions   Occupational Therapy Goal     OT, PT/OT Ongoing, Progressing    Description: Goals to be met by: 02/24/22    Patient will increase functional independence with ADLs by performing:    Toileting from toilet with [place] for hygiene and clothing management.   Toilet transfer to toilet with [place].                     Time Tracking:     OT Date of Treatment: 01/26/22  OT Start Time: 0955  OT Stop Time: 1008  OT Total Time (min): 13 min    Billable Minutes:Self Care/Home Management 13    OT/MATTHIAS: OT          1/26/2022

## 2022-01-26 NOTE — SUBJECTIVE & OBJECTIVE
Interval History:   1/24 Bedside debridement with gen surgery . No purulent drainage. Necrotic tissue excised. Hematoma removed.     1/25 seen by general surgery. Wound vac placement this afternoon.     1/26 NAEON. Wound vac with good suction. Nontender. No fevers. Stable this AM       Review of Systems   Constitutional: Negative for appetite change, chills, diaphoresis, fatigue, fever and unexpected weight change.   HENT: Negative for congestion, ear pain and hearing loss.    Respiratory: Negative for cough, chest tightness, shortness of breath and wheezing.    Cardiovascular: Negative for chest pain.   Gastrointestinal: Negative for abdominal pain, constipation, diarrhea, nausea and vomiting.   Genitourinary: Negative for decreased urine volume, difficulty urinating, dysuria, flank pain, frequency, hematuria and urgency.   Musculoskeletal: Negative for arthralgias, back pain and myalgias.   Skin: Positive for wound. Negative for rash.   Neurological: Negative for dizziness, facial asymmetry and headaches.   Psychiatric/Behavioral: Positive for decreased concentration and dysphoric mood. Negative for behavioral problems and confusion.     Objective:     Vital Signs (Most Recent):  Temp: 98.2 °F (36.8 °C) (01/26/22 1100)  Pulse: 74 (01/26/22 1100)  Resp: 16 (01/26/22 1100)  BP: 134/88 (01/26/22 1100)  SpO2: 98 % (01/26/22 1100) Vital Signs (24h Range):  Temp:  [97.8 °F (36.6 °C)-98.4 °F (36.9 °C)] 98.2 °F (36.8 °C)  Pulse:  [58-81] 74  Resp:  [16-18] 16  SpO2:  [96 %-100 %] 98 %  BP: (104-137)/(70-93) 134/88     Weight: 96.4 kg (212 lb 8.4 oz)  Body mass index is 34.3 kg/m².    Estimated Creatinine Clearance: 118.7 mL/min (based on SCr of 0.7 mg/dL).    Physical Exam  Vitals and nursing note reviewed.   Constitutional:       General: She is not in acute distress.     Appearance: She is well-developed. She is not diaphoretic.   HENT:      Head: Normocephalic and atraumatic.   Eyes:      Pupils: Pupils are equal,  round, and reactive to light.   Cardiovascular:      Rate and Rhythm: Normal rate and regular rhythm.      Heart sounds: Normal heart sounds. No murmur heard.  No friction rub. No gallop.    Pulmonary:      Effort: Pulmonary effort is normal. No respiratory distress.      Breath sounds: Normal breath sounds. No wheezing or rales.   Chest:      Chest wall: No tenderness.   Abdominal:      General: Bowel sounds are normal. There is no distension.      Palpations: Abdomen is soft. There is no mass.      Tenderness: There is no abdominal tenderness. There is no guarding or rebound.      Hernia: No hernia is present.   Musculoskeletal:         General: Signs of injury present. No swelling, tenderness or deformity. Normal range of motion.      Cervical back: Normal range of motion and neck supple.      Comments: Wound vac in place c/d/i   Skin:     General: Skin is warm and dry.      Coloration: Skin is not pale.      Findings: No erythema.   Neurological:      Mental Status: She is alert and oriented to person, place, and time.      Cranial Nerves: No cranial nerve deficit.      Coordination: Coordination normal.   Psychiatric:         Thought Content: Thought content normal.             Significant Labs: All pertinent labs within the past 24 hours have been reviewed.    Significant Imaging: I have reviewed all pertinent imaging results/findings within the past 24 hours.
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
## 2022-01-27 LAB
ANION GAP SERPL CALC-SCNC: 5 MMOL/L (ref 8–16)
BASOPHILS # BLD AUTO: 0.03 K/UL (ref 0–0.2)
BASOPHILS NFR BLD: 0.5 % (ref 0–1.9)
BUN SERPL-MCNC: 9 MG/DL (ref 6–20)
CALCIUM SERPL-MCNC: 8.7 MG/DL (ref 8.7–10.5)
CHLORIDE SERPL-SCNC: 106 MMOL/L (ref 95–110)
CO2 SERPL-SCNC: 27 MMOL/L (ref 23–29)
CREAT SERPL-MCNC: 0.8 MG/DL (ref 0.5–1.4)
DIFFERENTIAL METHOD: ABNORMAL
EOSINOPHIL # BLD AUTO: 0.4 K/UL (ref 0–0.5)
EOSINOPHIL NFR BLD: 6 % (ref 0–8)
ERYTHROCYTE [DISTWIDTH] IN BLOOD BY AUTOMATED COUNT: 13.1 % (ref 11.5–14.5)
EST. GFR  (AFRICAN AMERICAN): >60 ML/MIN/1.73 M^2
EST. GFR  (NON AFRICAN AMERICAN): >60 ML/MIN/1.73 M^2
GLUCOSE SERPL-MCNC: 116 MG/DL (ref 70–110)
HCT VFR BLD AUTO: 25 % (ref 37–48.5)
HGB BLD-MCNC: 7.6 G/DL (ref 12–16)
IMM GRANULOCYTES # BLD AUTO: 0.04 K/UL (ref 0–0.04)
IMM GRANULOCYTES NFR BLD AUTO: 0.6 % (ref 0–0.5)
LYMPHOCYTES # BLD AUTO: 1.9 K/UL (ref 1–4.8)
LYMPHOCYTES NFR BLD: 29.2 % (ref 18–48)
MCH RBC QN AUTO: 27.2 PG (ref 27–31)
MCHC RBC AUTO-ENTMCNC: 30.4 G/DL (ref 32–36)
MCV RBC AUTO: 90 FL (ref 82–98)
MONOCYTES # BLD AUTO: 0.5 K/UL (ref 0.3–1)
MONOCYTES NFR BLD: 8.3 % (ref 4–15)
NEUTROPHILS # BLD AUTO: 3.5 K/UL (ref 1.8–7.7)
NEUTROPHILS NFR BLD: 55.4 % (ref 38–73)
NRBC BLD-RTO: 0 /100 WBC
PLATELET # BLD AUTO: 404 K/UL (ref 150–450)
PMV BLD AUTO: 11.2 FL (ref 9.2–12.9)
POCT GLUCOSE: 122 MG/DL (ref 70–110)
POCT GLUCOSE: 149 MG/DL (ref 70–110)
POCT GLUCOSE: 163 MG/DL (ref 70–110)
POCT GLUCOSE: 70 MG/DL (ref 70–110)
POTASSIUM SERPL-SCNC: 3.6 MMOL/L (ref 3.5–5.1)
RBC # BLD AUTO: 2.79 M/UL (ref 4–5.4)
SODIUM SERPL-SCNC: 138 MMOL/L (ref 136–145)
WBC # BLD AUTO: 6.37 K/UL (ref 3.9–12.7)

## 2022-01-27 PROCEDURE — 11000001 HC ACUTE MED/SURG PRIVATE ROOM

## 2022-01-27 PROCEDURE — 25000003 PHARM REV CODE 250: Performed by: HOSPITALIST

## 2022-01-27 PROCEDURE — 85025 COMPLETE CBC W/AUTO DIFF WBC: CPT | Performed by: PHYSICIAN ASSISTANT

## 2022-01-27 PROCEDURE — 25000003 PHARM REV CODE 250: Performed by: PHYSICIAN ASSISTANT

## 2022-01-27 PROCEDURE — 80048 BASIC METABOLIC PNL TOTAL CA: CPT | Performed by: PHYSICIAN ASSISTANT

## 2022-01-27 PROCEDURE — 99233 SBSQ HOSP IP/OBS HIGH 50: CPT | Mod: ,,, | Performed by: PHYSICIAN ASSISTANT

## 2022-01-27 PROCEDURE — 36415 COLL VENOUS BLD VENIPUNCTURE: CPT | Performed by: PHYSICIAN ASSISTANT

## 2022-01-27 PROCEDURE — 99233 PR SUBSEQUENT HOSPITAL CARE,LEVL III: ICD-10-PCS | Mod: ,,, | Performed by: PHYSICIAN ASSISTANT

## 2022-01-27 RX ADMIN — CITALOPRAM HYDROBROMIDE 20 MG: 20 TABLET ORAL at 09:01

## 2022-01-27 RX ADMIN — AMOXICILLIN AND CLAVULANATE POTASSIUM 1 TABLET: 875; 125 TABLET, FILM COATED ORAL at 09:01

## 2022-01-27 RX ADMIN — OXYCODONE HYDROCHLORIDE 10 MG: 10 TABLET ORAL at 02:01

## 2022-01-27 RX ADMIN — ACETAMINOPHEN 1000 MG: 500 TABLET ORAL at 08:01

## 2022-01-27 RX ADMIN — DOCUSATE SODIUM 50 MG AND SENNOSIDES 8.6 MG 1 TABLET: 8.6; 5 TABLET, FILM COATED ORAL at 09:01

## 2022-01-27 RX ADMIN — AMOXICILLIN AND CLAVULANATE POTASSIUM 1 TABLET: 875; 125 TABLET, FILM COATED ORAL at 08:01

## 2022-01-27 RX ADMIN — DOCUSATE SODIUM 50 MG AND SENNOSIDES 8.6 MG 1 TABLET: 8.6; 5 TABLET, FILM COATED ORAL at 08:01

## 2022-01-27 RX ADMIN — ATORVASTATIN CALCIUM 40 MG: 20 TABLET, FILM COATED ORAL at 09:01

## 2022-01-27 RX ADMIN — AMITRIPTYLINE HYDROCHLORIDE 50 MG: 50 TABLET, FILM COATED ORAL at 08:01

## 2022-01-27 RX ADMIN — PREGABALIN 150 MG: 75 CAPSULE ORAL at 08:01

## 2022-01-27 RX ADMIN — ASPIRIN 81 MG: 81 TABLET, COATED ORAL at 09:01

## 2022-01-27 RX ADMIN — ACETAMINOPHEN 1000 MG: 500 TABLET ORAL at 09:01

## 2022-01-27 RX ADMIN — MELOXICAM 15 MG: 15 TABLET ORAL at 09:01

## 2022-01-27 RX ADMIN — Medication 1 CAPSULE: at 09:01

## 2022-01-27 RX ADMIN — AMLODIPINE BESYLATE 5 MG: 5 TABLET ORAL at 09:01

## 2022-01-27 RX ADMIN — DOXYCYCLINE HYCLATE 100 MG: 100 TABLET, COATED ORAL at 09:01

## 2022-01-27 RX ADMIN — TIZANIDINE 4 MG: 4 TABLET ORAL at 08:01

## 2022-01-27 RX ADMIN — LOSARTAN POTASSIUM 25 MG: 25 TABLET, FILM COATED ORAL at 09:01

## 2022-01-27 RX ADMIN — PREGABALIN 150 MG: 75 CAPSULE ORAL at 09:01

## 2022-01-27 RX ADMIN — DOXYCYCLINE HYCLATE 100 MG: 100 TABLET, COATED ORAL at 08:01
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
## 2022-01-27 NOTE — PLAN OF CARE
CARTER spoke with Julia at [facility] ([phone])-she needs wound measurements (LxWxD) doc in a clinical note as well as prvious therapies tried prior to placing wound vac. CARTER updated PA, requesting this, and once completed, note can be faxed to [facility] at [phone].    [facility] is also requesting name of HH provider and op note/debridebent note.     HH ref sent to [facility]. Pt accepted by [facility] , can admit pt Monday. CARTER awaiting confirmation from team that a Monday admit is ok.    SHREYAS/CARTER will fax clinical info listed above to [facility] once completed by MD. Tiffany Cody, CECEW  PRN
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
## 2022-01-27 NOTE — SUBJECTIVE & OBJECTIVE
Interval History: No acute events, patient seen at bedside resting in NAD. No complaints, plan for wound vac exchange tomorrow. Wound appear C/D/I    Review of Systems   Constitutional: Negative for chills and fever.   HENT: Negative for congestion and sinus pressure.    Eyes: Negative for photophobia and visual disturbance.   Respiratory: Negative for cough and shortness of breath.    Cardiovascular: Negative for chest pain and leg swelling.   Gastrointestinal: Negative for abdominal pain, nausea and vomiting.   Endocrine: Negative for polydipsia and polyuria.   Genitourinary: Negative for dysuria and hematuria.   Musculoskeletal: Negative for gait problem and myalgias.   Skin: Positive for wound. Negative for pallor.   Neurological: Negative for dizziness and syncope.   Psychiatric/Behavioral: Negative for agitation and behavioral problems.     Objective:     Vital Signs (Most Recent):  Temp: 98 °F (36.7 °C) (01/27/22 1150)  Pulse: 86 (01/27/22 1150)  Resp: 19 (01/27/22 1150)  BP: 133/79 (01/27/22 1150)  SpO2: 97 % (01/27/22 1150) Vital Signs (24h Range):  Temp:  [97.6 °F (36.4 °C)-98.5 °F (36.9 °C)] 98 °F (36.7 °C)  Pulse:  [61-97] 86  Resp:  [16-19] 19  SpO2:  [93 %-97 %] 97 %  BP: (117-144)/(68-92) 133/79     Weight: 96.4 kg (212 lb 8.4 oz)  Body mass index is 34.3 kg/m².  No intake or output data in the 24 hours ending 01/27/22 1400   Physical Exam  Vitals and nursing note reviewed.   Constitutional:       Appearance: Normal appearance. She is not ill-appearing or toxic-appearing.   HENT:      Head: Normocephalic and atraumatic.      Mouth/Throat:      Mouth: Mucous membranes are moist.   Eyes:      Extraocular Movements: Extraocular movements intact.   Cardiovascular:      Rate and Rhythm: Normal rate and regular rhythm.   Pulmonary:      Effort: Pulmonary effort is normal. No respiratory distress.   Abdominal:      General: Abdomen is flat. Bowel sounds are normal. There is no distension.      Palpations:  Abdomen is soft.   Musculoskeletal:         General: Tenderness and signs of injury present.      Cervical back: Normal range of motion.   Skin:     General: Skin is warm and dry.      Findings: Bruising (large hematoma improving) present.      Comments: Wound vac in place on right thigh   Neurological:      Mental Status: She is alert and oriented to person, place, and time.      Cranial Nerves: No cranial nerve deficit.   Psychiatric:         Mood and Affect: Mood is depressed. Affect is tearful.         Behavior: Behavior normal.         Significant Labs: All pertinent labs within the past 24 hours have been reviewed.    Significant Imaging: I have reviewed all pertinent imaging results/findings within the past 24 hours.

## 2022-01-27 NOTE — PLAN OF CARE
Pt is AAO x4, woundvac visible and suctioning. Pain management x1. POC reviewed and safety is maintained.

## 2022-01-28 VITALS
WEIGHT: 212.5 LBS | OXYGEN SATURATION: 95 % | RESPIRATION RATE: 17 BRPM | BODY MASS INDEX: 34.15 KG/M2 | SYSTOLIC BLOOD PRESSURE: 141 MMHG | DIASTOLIC BLOOD PRESSURE: 82 MMHG | HEIGHT: 66 IN | HEART RATE: 83 BPM | TEMPERATURE: 99 F

## 2022-01-28 LAB
POCT GLUCOSE: 103 MG/DL (ref 70–110)
POCT GLUCOSE: 183 MG/DL (ref 70–110)
POCT GLUCOSE: 86 MG/DL (ref 70–110)

## 2022-01-28 PROCEDURE — 25000003 PHARM REV CODE 250: Performed by: PHYSICIAN ASSISTANT

## 2022-01-28 PROCEDURE — 63600175 PHARM REV CODE 636 W HCPCS: Performed by: HOSPITALIST

## 2022-01-28 PROCEDURE — 90832 PSYTX W PT 30 MINUTES: CPT | Mod: ,,, | Performed by: STUDENT IN AN ORGANIZED HEALTH CARE EDUCATION/TRAINING PROGRAM

## 2022-01-28 PROCEDURE — 1111F PR DISCHARGE MEDS RECONCILED W/ CURRENT OUTPATIENT MED LIST: ICD-10-PCS | Mod: CPTII,,, | Performed by: PHYSICIAN ASSISTANT

## 2022-01-28 PROCEDURE — 97605 NEG PRS WND THER DME<=50SQCM: CPT

## 2022-01-28 PROCEDURE — 90832 PR PSYCHOTHERAPY W/PATIENT, 30 MIN: ICD-10-PCS | Mod: ,,, | Performed by: STUDENT IN AN ORGANIZED HEALTH CARE EDUCATION/TRAINING PROGRAM

## 2022-01-28 PROCEDURE — 97530 THERAPEUTIC ACTIVITIES: CPT

## 2022-01-28 PROCEDURE — 1111F DSCHRG MED/CURRENT MED MERGE: CPT | Mod: CPTII,,, | Performed by: PHYSICIAN ASSISTANT

## 2022-01-28 PROCEDURE — 97530 THERAPEUTIC ACTIVITIES: CPT | Mod: CQ

## 2022-01-28 PROCEDURE — 99239 HOSP IP/OBS DSCHRG MGMT >30: CPT | Mod: ,,, | Performed by: PHYSICIAN ASSISTANT

## 2022-01-28 PROCEDURE — 99239 PR HOSPITAL DISCHARGE DAY,>30 MIN: ICD-10-PCS | Mod: ,,, | Performed by: PHYSICIAN ASSISTANT

## 2022-01-28 PROCEDURE — 25000003 PHARM REV CODE 250: Performed by: HOSPITALIST

## 2022-01-28 RX ORDER — DOXYCYCLINE HYCLATE 100 MG
100 TABLET ORAL EVERY 12 HOURS
Qty: 10 TABLET | Refills: 0 | Status: SHIPPED | OUTPATIENT
Start: 2022-01-28 | End: 2022-02-02

## 2022-01-28 RX ORDER — ASPIRIN 81 MG/1
81 TABLET ORAL DAILY
Qty: 30 TABLET | Refills: 1 | Status: SHIPPED | OUTPATIENT
Start: 2022-01-29 | End: 2023-01-29

## 2022-01-28 RX ORDER — AMOXICILLIN AND CLAVULANATE POTASSIUM 875; 125 MG/1; MG/1
1 TABLET, FILM COATED ORAL EVERY 12 HOURS
Qty: 10 TABLET | Refills: 0 | Status: SHIPPED | OUTPATIENT
Start: 2022-01-28 | End: 2022-02-02

## 2022-01-28 RX ORDER — OXYCODONE HYDROCHLORIDE 10 MG/1
10 TABLET ORAL EVERY 12 HOURS PRN
Qty: 8 TABLET | Refills: 0 | Status: SHIPPED | OUTPATIENT
Start: 2022-01-28

## 2022-01-28 RX ADMIN — PREGABALIN 150 MG: 75 CAPSULE ORAL at 10:01

## 2022-01-28 RX ADMIN — ENOXAPARIN SODIUM 40 MG: 100 INJECTION SUBCUTANEOUS at 04:01

## 2022-01-28 RX ADMIN — Medication 1 CAPSULE: at 10:01

## 2022-01-28 RX ADMIN — DOXYCYCLINE HYCLATE 100 MG: 100 TABLET, COATED ORAL at 10:01

## 2022-01-28 RX ADMIN — ATORVASTATIN CALCIUM 40 MG: 20 TABLET, FILM COATED ORAL at 10:01

## 2022-01-28 RX ADMIN — DOCUSATE SODIUM 50 MG AND SENNOSIDES 8.6 MG 1 TABLET: 8.6; 5 TABLET, FILM COATED ORAL at 10:01

## 2022-01-28 RX ADMIN — AMLODIPINE BESYLATE 5 MG: 5 TABLET ORAL at 10:01

## 2022-01-28 RX ADMIN — MELOXICAM 15 MG: 15 TABLET ORAL at 10:01

## 2022-01-28 RX ADMIN — AMOXICILLIN AND CLAVULANATE POTASSIUM 1 TABLET: 875; 125 TABLET, FILM COATED ORAL at 10:01

## 2022-01-28 RX ADMIN — ACETAMINOPHEN 1000 MG: 500 TABLET ORAL at 10:01

## 2022-01-28 RX ADMIN — CITALOPRAM HYDROBROMIDE 20 MG: 20 TABLET ORAL at 09:01

## 2022-01-28 RX ADMIN — ASPIRIN 81 MG: 81 TABLET, COATED ORAL at 10:01

## 2022-01-28 RX ADMIN — LOSARTAN POTASSIUM 25 MG: 25 TABLET, FILM COATED ORAL at 10:01

## 2022-01-28 RX ADMIN — OXYCODONE HYDROCHLORIDE 10 MG: 10 TABLET ORAL at 10:01

## 2022-01-28 RX ADMIN — OXYCODONE HYDROCHLORIDE 10 MG: 10 TABLET ORAL at 04:01

## 2022-01-28 NOTE — PT/OT/SLP PROGRESS
Physical Therapy Treatment    Patient Name:  Juan Carlos Turner   MRN:  [id number]    Recommendations:     Discharge Recommendations:  home health PT   Discharge Equipment Recommendations: none   Barriers to discharge: None    Assessment:     Juan Carlos Turner is a 46 y.o. female admitted with a medical diagnosis of Post-traumatic wound infection.  She presents with the following impairments/functional limitations:  weakness,impaired endurance,impaired functional mobilty,impaired self care skills,gait instability,decreased lower extremity function,pain,impaired skin. Pt tolerated treatment well, and will continue to benefit from skilled PT services to improve all deficits noted above. Resume PT POC as indicated.    Rehab Prognosis: Good; patient would benefit from acute skilled PT services to address these deficits and reach maximum level of function.    Recent Surgery: * No surgery found *      Plan:     During this hospitalization, patient to be seen 3 x/week to address the identified rehab impairments via gait training,therapeutic activities,therapeutic exercises and progress toward the following goals:    · Plan of Care Expires:  02/23/22    Subjective     Chief Complaint: none stated  Patient/Family Comments/goals: none stated  Pain/Comfort:  · Pain Rating 1:  (Pt did not rate.)  · Location - Orientation 1: upper  · Location 1:  (leg)  · Pain Addressed 1: Pre-medicate for activity      Objective:     Communicated with nursing prior to session.  Patient found supine with  (all lines intact) upon PT entry to room.     General Precautions: Standard, fall   Orthopedic Precautions:N/A   Braces: N/A  Respiratory Status: Room air     Functional Mobility:  · Bed Mobility:  Supine to Sit: independence  · Sit to Supine: independence  · Transfers:  Sit to Stand:  independence with no AD  · Gait: ~60ft SBA using LUE to push IV pole. No LOB noted.       AM-PAC 6 CLICK MOBILITY  Turning over in bed (including adjusting  bedclothes, sheets and blankets)?: 4  Sitting down on and standing up from a chair with arms (e.g., wheelchair, bedside commode, etc.): 3  Moving from lying on back to sitting on the side of the bed?: 4  Moving to and from a bed to a chair (including a wheelchair)?: 3  Need to walk in hospital room?: 3  Climbing 3-5 steps with a railing?: 2  Basic Mobility Total Score: 19       Therapeutic Activities and Exercises:   -BLE therex x30 reps: AP,LAQ,HF    Patient left Seated EOB with all lines intact and nursing notified..    GOALS:   Multidisciplinary Problems     Physical Therapy Goals        Problem: Physical Therapy Goal    Goal Priority Disciplines Outcome Goal Variances Interventions   Physical Therapy Goal     PT, PT/OT Ongoing, Progressing     Description: Goals to be met by: 2022     Patient will increase functional independence with mobility by performin. Sit to stand transfer with Modified [place]  2. Bed to chair transfer with Supervision using RW or LRAD  3. Gait  x 150 feet with Supervision using RW or LRAD.   4. Lower extremity exercise program x30 reps per handout, with independence                     Time Tracking:     PT Received On: 22  PT Start Time: 1416     PT Stop Time: 1428  PT Total Time (min): 12 min     Billable Minutes: Therapeutic Activity 12    Treatment Type: Treatment  PT/PTA: PTA     PTA Visit Number: 2     2022
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
## 2022-01-28 NOTE — PLAN OF CARE
Pt AAOx4 w/ VSS during shift. CBG monitored and no coverage needed. Wound vac continuous 125. Pain managed w/ PO analgesics.

## 2022-01-28 NOTE — PROGRESS NOTES
[facility] Surg  Wound Care    Patient Name:  Juan Carlos Turner   MRN:  [id number]  Date: 1/28/2022  Diagnosis: Post-traumatic wound infection    History:     Past Medical History:   Diagnosis Date    Anxiety     Asthma     DDD (degenerative disc disease), cervical     Diabetes mellitus     Hypertension     JOSUE on CPAP        Social History     Socioeconomic History    Marital status: Single   Tobacco Use    Smoking status: Never Smoker    Smokeless tobacco: Never Used   Substance and Sexual Activity    Alcohol use: Yes     Comment: social    Drug use: No       Precautions:     Allergies as of 01/19/2022    (No Known Allergies)       [facility] Assessment Details/Treatment   Wound care consulted for wound vac dressing change to right medial thigh.      The wound vac dressing to the right medial thigh was removed using adhesive remover.    Instructed on application of Vashe wet to moist dressings BID. - daughter on facetime.     Pat the wound bed with VASHE wound solution on gauze to cleanse, apply Vashe wound solution to gauze then fluff each gauze and place over wound bed- add a little more Vashe to moisten gauze, spray Cavilon barrier film to the skin around the wound, cover the wound with an ABD dressing and tape the corners, secure with a stockinet (fishnet stocking) around the thigh to hold in place.  Do this in the morning and evening.     Ms. Turner plans on discharging this afternoon and home health will apply the [employer] wound vac on Monday.     Nursing to continue care, pressure prevention measures.  Recommendations made to primary team per secure chat for above plan . Orders placed.      01/28/22 1430        Negative Pressure Wound Therapy  Right anterior   No placement date or time found.   Side: Right  Orientation: anterior  Location: Thigh   NPWT Type Vacuum Therapy   Therapy Setting NPWT Continuous therapy   Pressure Setting NPWT 125 mmHg   Therapy Interventions NPWT other (see comments)  (wound vac dressing  discontinued)   Sponges Removed NPWT 2        Altered Skin Integrity 01/24/22 Right anterior Thigh Traumatic   Date First Assessed: 01/24/22   Altered Skin Integrity Present on Admission: yes  Side: Right  Orientation: anterior  Location: Thigh  Is this injury device related?: No  Primary Wound Type: (c) Traumatic   Wound Image    Dressing Appearance Intact;Moist drainage   Drainage Amount Small   Drainage Characteristics/Odor Serous   Appearance Red;Moist;Granulating   Tissue loss description Full thickness   Periwound Area Intact;Dry;Pink   Wound Edges Open   Wound Length (cm) 6 cm   Wound Width (cm) 8 cm   Wound Depth (cm) 1.2 cm   Wound Volume (cm^3) 57.6 cm^3   Wound Surface Area (cm^2) 48 cm^2   Care Cleansed with:;Antimicrobial agent;Applied:;Skin Barrier   Dressing Changed;Applied;Gauze, wet to moist;Absorptive Pad;Tubular bandage   Dressing Change Due 01/28/22 01/28/2022

## 2022-01-28 NOTE — PLAN OF CARE
[facility] Surg  Discharge Reassessment    Primary Care Provider: GIACOMO Amezcua    Expected Discharge Date: 1/28/2022    Reassessment (most recent)     Discharge Reassessment - 01/28/22 1524        Discharge Reassessment    Assessment Type Discharge Planning Reassessment     Did the patient's condition or plan change since previous assessment? No     Discharge Plan A Home Health     Discharge Plan B Home with family     DME Needed Upon Discharge  none     Discharge Barriers Identified None     Why the patient remains in the hospital Requires continued medical care        Post-Acute Status    Post-Acute Authorization Home Health     Home Health Status Set-up Complete/Auth obtained   [facility]                Macie Cervantes RN  Ext [phone]

## 2022-01-28 NOTE — PLAN OF CARE
PT observed to be stable, c/o of pain x 1 and pain was managed per prn oxycodone. No bm this shift and wound vac still in place. POC reviewed with pt and safety was maintained.

## 2022-01-28 NOTE — PLAN OF CARE
"[facility] - Med Surg  Discharge Final Note    Primary Care Provider: GIACOMO Amezcua    Expected Discharge Date: 1/28/2022    Final Discharge Note (most recent)       Final Note - 01/28/22 1704          Final Note    Assessment Type Final Discharge Note (P)      Anticipated Discharge Disposition Home-Health Care Svc (P)         Post-Acute Status    Post-Acute Authorization Home Health (P)      Home Health Status Set-up Complete/Auth obtained (P)      Discharge Delays None known at this time (P)                      Important Message from Medicare  Important Message from Medicare regarding Discharge Appeal Rights: Given to patient/caregiver,Explained to patient/caregiver,Signed/date by patient/caregiver     Date IMM was signed: 01/28/22  Time IMM was signed: 1259    Contact Info       GIACOMO Amezcua   Specialty: Internal Medicine   Relationship: PCP - General    [address] [facility] [address]   Phone: [phone]       Next Steps: Schedule an appointment as soon as possible for a visit in 1 week(s)    Instructions: Per [facility], patient must call to schedule her own appointment.          Pt will have HH with [facility]. Per txt team , HH is to attach wound vac. SW sent updated woundcare note to [place] Representative via email and faxed to [phone]. Multiple calls placed throughout the day with no return call. Will send email to f/u with pt at home address. Nurse SHREYAS copied on secure chat conversation with team for advisement on pt d/c home prior to finalization with [facility]. CM verbally advised that pt could be d/c home as team recommends. Tx team to provide orders for HH.     No further post acute needs at this time.       1/29/2022 10:15:29 AM Transmission Record   Sent to [phone] with remote ID "[facility]    "   Result: (0/339;0/0) Success   Page record: 1 - 24   Elapsed time: 07:50 on channel 48      Ramonita Webb LMSW  Case Management Social Worker   [facility]

## 2022-01-28 NOTE — PT/OT/SLP PROGRESS
Occupational Therapy   Treatment    Name: Juan Carlos Turner  MRN: [id number]  Admitting Diagnosis:  Post-traumatic wound infection       Recommendations:     Discharge Recommendations: home  Discharge Equipment Recommendations:  none  Barriers to discharge:  None    Assessment:     Juan Carlos Turner is a 46 y.o. female with a medical diagnosis of Post-traumatic wound infection.  She presents relieved with anticipation of d/c home with family. Performance deficits affecting function are weakness,impaired functional mobilty,pain.  She is independent in room, with improving limp with gait for toileting and grooming in bathroom. Education on use of RW for home on uneven surfaces.     Rehab Prognosis:  Good; patient would benefit from acute skilled OT services to address these deficits and reach maximum level of function.       Plan:     Patient to be seen 3 x/week to address the above listed problems via self-care/home management,therapeutic activities,therapeutic exercises  · Plan of Care Expires: 02/24/22  · Plan of Care Reviewed with: patient    Subjective     Pain/Comfort:  · Pain Rating 1: 3/10  · Location - Side 1: Right  · Location 1: leg  · Pain Addressed 1: Pre-medicate for activity,Reposition  · Pain Rating Post-Intervention 1: 3/10    Objective:     Communicated with: RN prior to session.  Patient found HOB elevated with wound vac upon OT entry to room.    General Precautions: Standard, fall   Orthopedic Precautions:N/A   Braces: N/A  Respiratory Status: Room air     Occupational Performance:     Bed Mobility:    · Patient completed Rolling/Turning to Right with independence  · Patient completed Supine to Sit with independence  · Patient completed Sit to Supine with independence     Functional Mobility/Transfers:  · Patient completed Sit <> Stand Transfer with independence  with  no assistive device   · Patient completed Bed <> Chair Transfer using Step Transfer technique with independence with no assistive  device  · Patient completed Toilet Transfer Step Transfer technique with independence with  no AD  · Functional Mobility: in room limp is improving    Activities of Daily Living:  · Upper Body Dressing: independence    · Toileting: independence in bathroom      [facility] 6 Click ADL: 23    Treatment & Education:  Education on use of RW on uneven surfaces for fall prevention.    Patient left HOB elevated with all lines intact and call button in reachEducation:      GOALS:   Multidisciplinary Problems     Occupational Therapy Goals        Problem: Occupational Therapy Goal    Goal Priority Disciplines Outcome Interventions   Occupational Therapy Goal     OT, PT/OT Ongoing, Progressing    Description: Goals to be met by: 02/24/22    Patient will increase functional independence with ADLs by performing:    Toileting from toilet with [place] for hygiene and clothing management.   Toilet transfer to toilet with [place].                     Time Tracking:     OT Date of Treatment: 01/28/22  OT Start Time: 1250  OT Stop Time: 1303  OT Total Time (min): 13 min    Billable Minutes:Therapeutic Activity 13    OT/MATTHIAS: OT          1/28/2022

## 2022-01-28 NOTE — PLAN OF CARE
[facility]      HOME HEALTH ORDERS  FACE TO FACE ENCOUNTER    Patient Name: Juan Carlos Turner  YOB: 1975    PCP: GIACOMO Amezcua   PCP Address: [facility] [place] [address]  PCP Phone Number: [phone]  PCP Fax: [phone]    Encounter Date: 1/19/22    Admit to Home Health    Diagnoses:  Active Hospital Problems    Diagnosis  POA    *Post-traumatic wound infection [T14.8XXA, L08.9]  Yes    Major depressive disorder, recurrent episode, in partial remission [F33.41]  Yes    Open wound of right thigh [S71.101A]  Yes    Type 2 diabetes mellitus with peripheral neuropathy [E11.42]  Yes    Moderate persistent asthma [J45.40]  Yes      Resolved Hospital Problems   No resolved problems to display.       Follow Up Appointments:  No future appointments.    Allergies:Review of patient's allergies indicates:  No Known Allergies    Medications: Review discharge medications with patient and family and provide education.    Current Facility-Administered Medications   Medication Dose Route Frequency Provider Last Rate Last Admin    acetaminophen tablet 1,000 mg  1,000 mg Oral TID Aydee Teague PA-C   1,000 mg at 01/28/22 1022    amitriptyline tablet 50 mg  50 mg Oral Nightly Terrance Sheridan MD   50 mg at 01/27/22 2032    amLODIPine tablet 5 mg  5 mg Oral Daily Terrance Sheridan MD   5 mg at 01/28/22 1022    amoxicillin-clavulanate 875-125mg per tablet 1 tablet  1 tablet Oral Q12H Elvia Jhaveri PA-C   1 tablet at 01/28/22 1022    aspirin EC tablet 81 mg  81 mg Oral Daily Terrance Sheridan MD   81 mg at 01/28/22 1022    atorvastatin tablet 40 mg  40 mg Oral Daily Terrance Sheridan MD   40 mg at 01/28/22 1021    citalopram tablet 20 mg  20 mg Oral Daily Terrance Sheridan MD   20 mg at 01/28/22 0900    dextrose 50% injection 12.5 g  12.5 g Intravenous PRN Terrance Sheridan MD        dextrose 50% injection 25 g  25 g Intravenous PRN Terrance LEYVA  MD Fatimah        doxycycline tablet 100 mg  100 mg Oral Q12H [facility] Jhaveri   100 mg at 01/28/22 1022    enoxaparin injection 40 mg  40 mg Subcutaneous Daily Terrance Sheridan MD   40 mg at 01/28/22 1626    glucagon (human recombinant) injection 1 mg  1 mg Intramuscular PRN Terrance Sheridan MD        glucose chewable tablet 16 g  16 g Oral PRN Terrance Sheridan MD        glucose chewable tablet 24 g  24 g Oral PRN Terrance Sheridan MD        HYDROmorphone injection 1 mg  1 mg Intravenous Q6H PRN Terrance Sheridan MD   1 mg at 01/20/22 2137    insulin aspart U-100 pen 0-5 Units  0-5 Units Subcutaneous QID (AC + HS) PRN Terrance Sheridan MD   2 Units at 01/21/22 1719    Lactobacillus rhamnosus GG capsule 1 capsule  1 capsule Oral Daily Aydee Teague PA-C   1 capsule at 01/28/22 1022    losartan tablet 25 mg  25 mg Oral Daily Terrance Sheridan MD   25 mg at 01/28/22 1022    melatonin tablet 6 mg  6 mg Oral Nightly PRN Terrance Sheridan MD        meloxicam tablet 15 mg  15 mg Oral Daily Terrance Sheridan MD   15 mg at 01/28/22 1022    naloxone 0.4 mg/mL injection 0.02 mg  0.02 mg Intravenous PRN Terrance Sheridan MD        ondansetron injection 4 mg  4 mg Intravenous Q8H PRN Terrance Sheridan MD        oxyCODONE immediate release tablet 5 mg  5 mg Oral Q6H PRN Terrance Sheridan MD        oxyCODONE immediate release tablet Tab 10 mg  10 mg Oral Q6H PRN Terrance Sheridan MD   10 mg at 01/28/22 1027    pregabalin capsule 150 mg  150 mg Oral BID Terrance Sheridan MD   150 mg at 01/28/22 1022    senna-docusate 8.6-50 mg per tablet 1 tablet  1 tablet Oral BID Terrance Sheridan MD   1 tablet at 01/28/22 1021    sodium chloride 0.9% flush 10 mL  10 mL Intravenous Q12H PRN Terrance Sheridan MD        tiZANidine tablet 4 mg  4 mg Oral Nightly Terrance Sheridan MD   4 mg at 01/27/22 2032     Current Discharge Medication List       START taking these medications    Details   amoxicillin-clavulanate 875-125mg (AUGMENTIN) 875-125 mg per tablet Take 1 tablet by mouth every 12 (twelve) hours. for 5 days  Qty: 10 tablet, Refills: 0      doxycycline (VIBRA-TABS) 100 MG tablet Take 1 tablet (100 mg total) by mouth every 12 (twelve) hours. for 5 days  Qty: 10 tablet, Refills: 0      oxyCODONE (ROXICODONE) 10 mg Tab immediate release tablet Take 1 tablet (10 mg total) by mouth every 12 (twelve) hours as needed for Pain.  Qty: 8 tablet, Refills: 0    Comments: Quantity prescribed more than 7 day supply? No         CONTINUE these medications which have CHANGED    Details   aspirin (ECOTRIN) 81 MG EC tablet Take 1 tablet (81 mg total) by mouth once daily.  Qty: 30 tablet, Refills: 1         CONTINUE these medications which have NOT CHANGED    Details   albuterol (PROVENTIL) 2.5 mg /3 mL (0.083 %) nebulizer solution Take 2.5 mg by nebulization every 4 to 6 hours as needed for Wheezing or Shortness of Breath.  Refills: 3      amitriptyline (ELAVIL) 50 MG tablet Take 50 mg by mouth nightly.      amlodipine (NORVASC) 10 MG tablet Take 10 mg by mouth once daily.      atorvastatin (LIPITOR) 40 MG tablet Take 40 mg by mouth once daily.      citalopram (CELEXA) 20 MG tablet Take 20 mg by mouth once daily.      fluticasone propionate (FLONASE) 50 mcg/actuation nasal spray 1 spray (50 mcg total) by Each Nostril route 2 (two) times daily as needed.  Qty: 15 g, Refills: 0    Associated Diagnoses: URI with cough and congestion      ketotifen (ZADITOR) 0.025 % (0.035 %) ophthalmic solution INSTILL ONE DROP IN AFFECTED EYE ONCE A DAY AS NEEDED FOR ITCHY EYES      losartan (COZAAR) 25 MG tablet Take 25 mg by mouth once daily.      meloxicam (MOBIC) 15 MG tablet Take 15 mg by mouth once daily.      metFORMIN (GLUCOPHAGE) 1000 MG tablet Take 1,000 mg by mouth daily with breakfast.      methocarbamoL (ROBAXIN) 500 MG Tab Take 500 mg by mouth 2 (two) times daily.       multivitamin capsule Take 1 capsule by mouth once daily.      ondansetron (ZOFRAN-ODT) 8 MG TbDL Take 8 mg by mouth every 8 (eight) hours as needed.      pregabalin (LYRICA) 150 MG capsule Take 150 mg by mouth 2 (two) times daily.      VENTOLIN HFA 90 mcg/actuation inhaler Inhale 1-2 puffs into the lungs every 4 to 6 hours as needed for Wheezing or Shortness of Breath.      ONETOUCH DELICA LANCETS 33 gauge Misc TEST ONCE D  Refills: 3      ONETOUCH ULTRA BLUE TEST STRIP Strp TEST ONCE D BEFORE BREAKFAST  Refills: 3      ONETOUCH ULTRAMINI kit UTD  Refills: 0         STOP taking these medications       oxyCODONE-acetaminophen (PERCOCET) 5-325 mg per tablet Comments:   Reason for Stopping:         tiZANidine (ZANAFLEX) 4 MG tablet Comments:   Reason for Stopping:         traMADoL (ULTRAM) 50 mg tablet Comments:   Reason for Stopping:                 I have seen and examined this patient within the last 30 days. My clinical findings that support the need for the home health skilled services and home bound status are the following:no   Weakness/numbness causing balance and gait disturbance due to Infection making it taxing to leave home.     Diet:   regular diet    Labs:  n/a    Referrals/ Consults  Wound care    Activities:   activity as tolerated    Nursing:   Agency to admit patient within 24 hours of hospital discharge unless specified on physician order or at patient request    SN to complete comprehensive assessment including routine vital signs. Instruct on disease process and s/s of complications to report to MD. Review/verify medication list sent home with the patient at time of discharge  and instruct patient/caregiver as needed. Frequency may be adjusted depending on start of care date.     Skilled nurse to perform up to 3 visits PRN for symptoms related to diagnosis    Notify MD if SBP > 160 or < 90; DBP > 90 or < 50; HR > 120 or < 50; Temp > 101; O2 < 88%    Ok to schedule additional visits based on staff  availability and patient request on consecutive days within the home health episode.    When multiple disciplines ordered:    Start of Care occurs on Sunday - Wednesday schedule remaining discipline evaluations as ordered on separate consecutive days following the start of care.    Thursday SOC -schedule subsequent evaluations Friday and Monday the following week.     Friday - Saturday SOC - schedule subsequent discipline evaluations on consecutive days starting Monday of the following week.    Miscellaneous   Wound Care Orders:  yes:        Pat the wound bed with VASHE wound solution on gauze to cleanse, apply Vashe wound solution to gauze then fluff each gauze and place over wound bed- add a little more Vashe to moisten gauze, spray Cavilon barrier film to the skin around the wound, cover the wound with an ABD dressing and tape the corners, secure with a stockinet (fishnet stocking) around the thigh to hold in place.  Do this in the morning and evening.      Ms. Turner plans on discharging this afternoon and home health will apply the [employer] wound vac on Monday.       I certify that this patient is confined to her home and needs intermittent skilled nursing care.

## 2022-01-29 NOTE — NURSING
Pt DC'd per MD. All personal belongings are with pt and family. IV removed and dressed per protocol. Pt left via wheelchair w/ family.
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
## 2022-01-31 PROCEDURE — G0180 MD CERTIFICATION HHA PATIENT: HCPCS | Mod: ,,, | Performed by: INTERNAL MEDICINE

## 2022-01-31 PROCEDURE — G0180 PR HOME HEALTH MD CERTIFICATION: ICD-10-PCS | Mod: ,,, | Performed by: INTERNAL MEDICINE

## 2022-02-01 ENCOUNTER — TELEPHONE (OUTPATIENT)
Dept: HEPATOLOGY | Facility: CLINIC | Age: 47
End: 2022-02-01
Payer: MEDICARE

## 2022-02-01 NOTE — PLAN OF CARE
Jocelynn informed by hospital medicine  Quiana Wiley that pt was sent home with out a home wound vac. Jocelynn placed call to Jordana with [facility] at , Jocelynn informed by Monika with [employer] that Pt's wound vac will be out today for delivery at Pt's home. Jocelynn updated  leadership and also notified [facility] home [facility] rep Libia to be sure they can attach the [employer] wound vac at home. [facility] asked for updated orders as no wound vac orders were on the  orders.
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
## 2022-02-01 NOTE — TELEPHONE ENCOUNTER
Wound vac being delivered to patient's home by [facility] mao and [facility]  will be notified so they can go out to put vac on wound.  Patient's daughter notified.

## 2022-02-01 NOTE — TELEPHONE ENCOUNTER
----- Message from Kamla Garnica sent at 1/31/2022  3:37 PM CST -----  Contact: Self/call pt's daughter/Kevin/[phone]  Dr Elisha Carter    Pt said that she is calling in regards to she was supposed to have a vac machine and wound care done pt is calling to check the status pt stated that the nurse came out to visit her today but no she has not received any other services. Please advise

## 2022-02-15 ENCOUNTER — TELEPHONE (OUTPATIENT)
Dept: WOUND CARE | Facility: CLINIC | Age: 47
End: 2022-02-15
Payer: MEDICARE

## 2022-02-15 NOTE — TELEPHONE ENCOUNTER
Call was placed back to patient and appointment was made with daughter for 02/22/2022@10:30a.m for wound care.

## 2022-02-15 NOTE — TELEPHONE ENCOUNTER
----- Message from Bobbi Rajput sent at 2/15/2022  9:40 AM CST -----  Type:  Sooner Apoointment Request    Caller is requesting a sooner appointment.  Caller declined first available appointment listed below.  Caller will not accept being placed on the waitlist and is requesting a message be sent to doctor.  Name of Caller:Juan Carlos Turner    When is the first available appointment?no available appointment     Symptoms:follow up visit     Would the patient rather a call back or [facility]? Call back    Best Call Back Number:[phone] Daughter     Additional Information:

## 2022-02-22 ENCOUNTER — OFFICE VISIT (OUTPATIENT)
Dept: WOUND CARE | Facility: CLINIC | Age: 47
End: 2022-02-22
Payer: MEDICARE

## 2022-02-22 VITALS
WEIGHT: 193.81 LBS | BODY MASS INDEX: 31.15 KG/M2 | HEIGHT: 66 IN | TEMPERATURE: 96 F | SYSTOLIC BLOOD PRESSURE: 159 MMHG | HEART RATE: 60 BPM | DIASTOLIC BLOOD PRESSURE: 79 MMHG

## 2022-02-22 DIAGNOSIS — S71.101A OPEN WOUND OF RIGHT THIGH, INITIAL ENCOUNTER: Primary | ICD-10-CM

## 2022-02-22 PROCEDURE — 4010F PR ACE/ARB THEARPY RXD/TAKEN: ICD-10-PCS | Mod: CPTII,S$GLB,, | Performed by: SURGERY

## 2022-02-22 PROCEDURE — 3078F PR MOST RECENT DIASTOLIC BLOOD PRESSURE < 80 MM HG: ICD-10-PCS | Mod: CPTII,S$GLB,, | Performed by: SURGERY

## 2022-02-22 PROCEDURE — 99203 OFFICE O/P NEW LOW 30 MIN: CPT | Mod: S$GLB,,, | Performed by: SURGERY

## 2022-02-22 PROCEDURE — 3044F PR MOST RECENT HEMOGLOBIN A1C LEVEL <7.0%: ICD-10-PCS | Mod: CPTII,S$GLB,, | Performed by: SURGERY

## 2022-02-22 PROCEDURE — 99999 PR PBB SHADOW E&M-EST. PATIENT-LVL IV: ICD-10-PCS | Mod: PBBFAC,,, | Performed by: SURGERY

## 2022-02-22 PROCEDURE — 4010F ACE/ARB THERAPY RXD/TAKEN: CPT | Mod: CPTII,S$GLB,, | Performed by: SURGERY

## 2022-02-22 PROCEDURE — 99999 PR PBB SHADOW E&M-EST. PATIENT-LVL IV: CPT | Mod: PBBFAC,,, | Performed by: SURGERY

## 2022-02-22 PROCEDURE — 3008F BODY MASS INDEX DOCD: CPT | Mod: CPTII,S$GLB,, | Performed by: SURGERY

## 2022-02-22 PROCEDURE — 3077F PR MOST RECENT SYSTOLIC BLOOD PRESSURE >= 140 MM HG: ICD-10-PCS | Mod: CPTII,S$GLB,, | Performed by: SURGERY

## 2022-02-22 PROCEDURE — 3078F DIAST BP <80 MM HG: CPT | Mod: CPTII,S$GLB,, | Performed by: SURGERY

## 2022-02-22 PROCEDURE — 99203 PR OFFICE/OUTPT VISIT, NEW, LEVL III, 30-44 MIN: ICD-10-PCS | Mod: S$GLB,,, | Performed by: SURGERY

## 2022-02-22 PROCEDURE — 1159F PR MEDICATION LIST DOCUMENTED IN MEDICAL RECORD: ICD-10-PCS | Mod: CPTII,S$GLB,, | Performed by: SURGERY

## 2022-02-22 PROCEDURE — 3077F SYST BP >= 140 MM HG: CPT | Mod: CPTII,S$GLB,, | Performed by: SURGERY

## 2022-02-22 PROCEDURE — 3008F PR BODY MASS INDEX (BMI) DOCUMENTED: ICD-10-PCS | Mod: CPTII,S$GLB,, | Performed by: SURGERY

## 2022-02-22 PROCEDURE — 1159F MED LIST DOCD IN RCRD: CPT | Mod: CPTII,S$GLB,, | Performed by: SURGERY

## 2022-02-22 PROCEDURE — 3044F HG A1C LEVEL LT 7.0%: CPT | Mod: CPTII,S$GLB,, | Performed by: SURGERY
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
## 2022-02-22 NOTE — PATIENT INSTRUCTIONS
Wound vac discontinued per Dr. Mcfarlane  [facility] discontinued per Dr. Mcfarlane  Wound care orders per Dr. Mcfarlane  Daily dressing change  Shower with mild soap  Irrigate wound for 15 minutes while in shower with luke warm water  Pat dry  Apply wound hydrogel to right thigh wound  Cover with gauze and secure with island dressing  Return to clinic in 10 days - Friday, 3/4/2022 at 9:30am

## 2022-02-28 NOTE — PROGRESS NOTES
I have examined the patient with the wound care nurses.  They are noted his abdomen dictation.  I have evaluated the patient I agree with their notes.  I have advised the plan of care which they will follow.  Please see the full note by Wound Care which I am in total agreement

## 2022-03-02 ENCOUNTER — EXTERNAL HOME HEALTH (OUTPATIENT)
Dept: HOME HEALTH SERVICES | Facility: HOSPITAL | Age: 47
End: 2022-03-02
Payer: MEDICARE

## 2022-03-04 ENCOUNTER — CLINICAL SUPPORT (OUTPATIENT)
Dept: WOUND CARE | Facility: CLINIC | Age: 47
End: 2022-03-04
Payer: MEDICARE

## 2022-03-04 VITALS
WEIGHT: 189.13 LBS | DIASTOLIC BLOOD PRESSURE: 76 MMHG | HEART RATE: 81 BPM | HEIGHT: 66 IN | SYSTOLIC BLOOD PRESSURE: 143 MMHG | TEMPERATURE: 97 F | BODY MASS INDEX: 30.4 KG/M2

## 2022-03-04 DIAGNOSIS — S71.101A OPEN WOUND OF RIGHT THIGH, INITIAL ENCOUNTER: Primary | ICD-10-CM

## 2022-03-04 PROCEDURE — 99999 PR PBB SHADOW E&M-EST. PATIENT-LVL IV: ICD-10-PCS | Mod: PBBFAC,,,

## 2022-03-04 PROCEDURE — 99999 PR PBB SHADOW E&M-EST. PATIENT-LVL IV: CPT | Mod: PBBFAC,,,

## 2022-03-04 NOTE — PROGRESS NOTES
Subjective:       Patient ID: Juan Carlos Turner is a 46 y.o. female.    Chief Complaint: Wound Check (Right thigh open wound)    HPI  Review of Systems    Objective:      Physical Exam    Assessment:       1. Open wound of right thigh, initial encounter           Wound Right medial Thigh (Active)        Pre-existing:    Primary Wound Type:    Side: Right   Orientation: medial   Location: Thigh   Wound Number:    Ankle-Brachial Index:    Pulses:    Removal Indication and Assessment:    Wound Outcome:    (Retired) Wound Type:    (Retired) Wound Length (cm):    (Retired) Wound Width (cm):    (Retired) Depth (cm):    Wound Description (Comments):    Removal Indications:    Wound Image   03/04/22 0932   Dressing Appearance Dry;Intact;Clean 03/04/22 0932   Drainage Amount Small 03/04/22 0932   Drainage Characteristics/Odor Serosanguineous 03/04/22 0932   Red (%), Wound Tissue Color 100 % 03/04/22 0932   Periwound Area Intact;Dry;Pink;Swelling 03/04/22 0932   Wound Length (cm) 2.5 cm 03/04/22 0932   Wound Width (cm) 6 cm 03/04/22 0932   Wound Surface Area (cm^2) 15 cm^2 03/04/22 0932   Care Cleansed with:;Wound cleanser 03/04/22 0932   Dressing Applied;Hydrogel;Gauze;Island/border;Other (comment) 03/04/22 0932   Periwound Care Skin barrier film applied 03/04/22 0932   Dressing Change Due 03/15/22 03/04/22 0932     Juan Carlos was seen in wound care clinic today, placed in sitting position with legs elevated in treatment chair.  Dressing removed and right thigh wound cleaned with Vashe wound solution and dried thoroughly.  Wound bed beefy red with appearance of proud flesh or hypergranulation tissue noted.  Skin prep to raffi-wound skin.  Wound hydrogel applied directly to base of wound covered with gauze and secured with an island dressing.  Return to clinic to see Dr. Mcfarlane 3/15/2022.              Plan:       Right thigh wound dressed as detailed above  Patient instructed to perform daily dressing changes as follows:  Shower  only with mild soap and water  Irrigate wound while in shower for 5-10 minutes  Pat dried thoroughly  Apply wound Hydrogel directly to wound bed  Cover with gauze   Secure with island dressing or paper tape  Elevate leg while sitting for prolonged periods of time  Verbal and written instructions given to patient  Return to clinic to see Dr. Mcfarlane 3/15/2022 at 9:30am

## 2022-03-04 NOTE — PATIENT INSTRUCTIONS
Patient instructed to perform daily dressing changes as follows:  Shower only with mild soap and water  Irrigate wound while in shower for 5-10 minutes  Pat dried thoroughly  Apply wound Hydrogel directly to wound bed  Cover with gauze   Secure with island dressing or paper tape  Elevate leg while sitting for prolonged periods of time  Verbal and written instructions given to patient  Return to clinic to see Dr. Mcfarlane 3/15/2022 at 9:30am

## 2022-03-15 ENCOUNTER — CLINICAL SUPPORT (OUTPATIENT)
Dept: WOUND CARE | Facility: CLINIC | Age: 47
End: 2022-03-15
Payer: MEDICARE

## 2022-03-15 VITALS
SYSTOLIC BLOOD PRESSURE: 138 MMHG | TEMPERATURE: 99 F | BODY MASS INDEX: 30.82 KG/M2 | HEART RATE: 60 BPM | HEIGHT: 66 IN | WEIGHT: 191.81 LBS | DIASTOLIC BLOOD PRESSURE: 98 MMHG

## 2022-03-15 DIAGNOSIS — S71.101A OPEN WOUND OF RIGHT THIGH, INITIAL ENCOUNTER: Primary | ICD-10-CM

## 2022-03-15 PROCEDURE — 99999 PR PBB SHADOW E&M-EST. PATIENT-LVL IV: CPT | Mod: PBBFAC,,,

## 2022-03-15 PROCEDURE — 99999 PR PBB SHADOW E&M-EST. PATIENT-LVL IV: ICD-10-PCS | Mod: PBBFAC,,,

## 2022-03-15 NOTE — PROGRESS NOTES
Subjective:       Patient ID: Juan Carlos Turner is a 46 y.o. female.    Chief Complaint: Wound Check (Open wound to right thigh)    HPI  Review of Systems    Objective:      Physical Exam    Assessment:       No diagnosis found.       Wound Right anterior Thigh (Active)        Pre-existing:    Primary Wound Type:    Side: Right   Orientation: anterior   Location: Thigh   Wound Number:    Ankle-Brachial Index:    Pulses:    Removal Indication and Assessment:    Wound Outcome:    (Retired) Wound Type:    (Retired) Wound Length (cm):    (Retired) Wound Width (cm):    (Retired) Depth (cm):    Wound Description (Comments):    Removal Indications:    Wound Image   03/15/22 1604   Dressing Appearance Dry;Intact;Clean 03/15/22 1604   Drainage Amount Scant 03/15/22 1604   Drainage Characteristics/Odor Sanguineous 03/15/22 1604   Red (%), Wound Tissue Color 100 % 03/15/22 1604   Periwound Area Intact;Dry 03/15/22 1604   Wound Length (cm) 2 cm 03/15/22 1604   Wound Width (cm) 5 cm 03/15/22 1604   Wound Surface Area (cm^2) 10 cm^2 03/15/22 1604   Care Cleansed with:;Wound cleanser 03/15/22 1604   Dressing Gauze;Island/border 03/15/22 1604   Dressing Change Due 03/21/22 03/15/22 1604       Juan Carlos was seen in wound care clinic today, placed in sitting position with legs elevated in treatment chair.  Dressing removed and right thigh wound cleaned with Vashe wound solution and dried thoroughly.  Wound bed beefy red with appearance of proud flesh or hypergranulation tissue noted. Patient was seen by Dr. Mcfarlane and silver nitrate was applied to the bed of the wound. Patient was instructed by Dr. Mcfarlane not to bath for two days. Don't apply anything to the wound just cover wound with gauze and secure with paper tape and change covering daily.       Plan:     Patient was instructed not to take a bath for 2 days. Don't apply anything to the wound, cover the wound with gauze and secure with paper tape.   Instructed to covering daily.  Return to clinic on 3/21/2022

## 2022-03-15 NOTE — PATIENT INSTRUCTIONS
Patient was instructed not to take a bath for 2 days. Don't apply anything to the wound, cover the wound with gauze and secure with paper tape.   Instructed to covering daily. Return to clinic on 3/21/2022
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
## 2022-03-21 ENCOUNTER — CLINICAL SUPPORT (OUTPATIENT)
Dept: WOUND CARE | Facility: CLINIC | Age: 47
End: 2022-03-21
Payer: MEDICARE

## 2022-03-21 VITALS
WEIGHT: 190.25 LBS | TEMPERATURE: 98 F | SYSTOLIC BLOOD PRESSURE: 132 MMHG | DIASTOLIC BLOOD PRESSURE: 91 MMHG | BODY MASS INDEX: 30.71 KG/M2 | HEART RATE: 58 BPM

## 2022-03-21 DIAGNOSIS — S71.101A OPEN WOUND OF RIGHT THIGH, INITIAL ENCOUNTER: Primary | ICD-10-CM

## 2022-03-21 PROCEDURE — 99999 PR PBB SHADOW E&M-EST. PATIENT-LVL IV: CPT | Mod: PBBFAC,,,

## 2022-03-21 PROCEDURE — 99999 PR PBB SHADOW E&M-EST. PATIENT-LVL IV: ICD-10-PCS | Mod: PBBFAC,,,

## 2022-03-21 NOTE — PATIENT INSTRUCTIONS
Right thigh wound dressed as detailed above  Patient instructed to perform daily dressing changes as follows:  Shower only with mild soap and water  Irrigate wound while in shower for 5-10 minutes  Pat dried thoroughly  Apply wound Hydrogel directly to wound bed  Cover with gauze   Secure with island dressing or paper tape  Elevate leg while sitting for prolonged periods of time  Verbal and written instructions given to patient  Return to clinic to see Dr. Mcfarlane 04/05/2022
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
## 2022-03-21 NOTE — PROGRESS NOTES
Subjective:       Patient ID: Juan Carlos Turner is a 46 y.o. female.    Chief Complaint: Wound Check (Open wound of the right thigh)    HPI  Review of Systems    Objective:      Physical Exam    Assessment:       1. Open wound of right thigh, initial encounter           Wound Right anterior Thigh (Active)        Pre-existing:    Primary Wound Type:    Side: Right   Orientation: anterior   Location: Thigh   Wound Number:    Ankle-Brachial Index:    Pulses:    Removal Indication and Assessment:    Wound Outcome:    (Retired) Wound Type:    (Retired) Wound Length (cm):    (Retired) Wound Width (cm):    (Retired) Depth (cm):    Wound Description (Comments):    Removal Indications:    Wound Image   03/21/22 1037   Dressing Appearance Intact;Dry;Clean 03/21/22 1037   Drainage Amount None 03/21/22 1037   Red (%), Wound Tissue Color 100 % 03/21/22 1037   Periwound Area Intact;[place] 03/21/22 1037   Care Cleansed with:;Wound cleanser 03/21/22 1037   Dressing Applied;Hydrogel;Gauze;Island/border 03/21/22 1037   Periwound Care Skin barrier film applied 03/21/22 1037   Dressing Change Due 04/05/22 03/21/22 Tila Rangel was seen in wound care clinic today, placed in sitting position with legs elevated in treatment chair.  Dressing removed and right thigh wound cleaned with Vashe wound solution and dried thoroughly.  Wound bed beefy red with appearance of proud flesh or hypergranulation tissue noted.  Skin prep to raffi-wound skin.  Wound hydrogel applied directly to base of wound covered with gauze and secured with an island dressing.  Return to clinic to see Dr. Mcfarlane 04/5/2022    Plan:    Right thigh wound dressed as detailed above  Patient instructed to perform daily dressing changes as follows:  Shower only with mild soap and water  Irrigate wound while in shower for 5-10 minutes  Pat dried thoroughly  Apply wound Hydrogel directly to wound bed  Cover with gauze   Secure with island dressing or paper tape  Elevate leg  while sitting for prolonged periods of time  Verbal and written instructions given to patient  Return to clinic to see Dr. Mcfarlane 04/05/2022

## 2022-04-05 ENCOUNTER — CLINICAL SUPPORT (OUTPATIENT)
Dept: WOUND CARE | Facility: CLINIC | Age: 47
End: 2022-04-05
Payer: MEDICARE

## 2022-04-05 VITALS
TEMPERATURE: 99 F | BODY MASS INDEX: 30.47 KG/M2 | HEART RATE: 64 BPM | WEIGHT: 189.63 LBS | SYSTOLIC BLOOD PRESSURE: 131 MMHG | HEIGHT: 66 IN | DIASTOLIC BLOOD PRESSURE: 74 MMHG

## 2022-04-05 DIAGNOSIS — S71.101A OPEN WOUND OF RIGHT THIGH, INITIAL ENCOUNTER: Primary | ICD-10-CM

## 2022-04-05 PROCEDURE — 99999 PR PBB SHADOW E&M-EST. PATIENT-LVL IV: ICD-10-PCS | Mod: PBBFAC,,,

## 2022-04-05 PROCEDURE — 99999 PR PBB SHADOW E&M-EST. PATIENT-LVL IV: CPT | Mod: PBBFAC,,,

## 2022-04-05 NOTE — PATIENT INSTRUCTIONS
Patient instructed not to get dressing wet  Patient may remove dressing tomorrow, if wound bed appears gray/black apply dry gauze dressing and secure with island dressing or paper tape and do not get dressing wet  If wound bed appears red and gray/black is gone - patient may shower with mild soap and water  Irrigate wound while in shower for 5-10 minutes  Pat dried thoroughly  Apply wound Hydrogel directly to wound bed  Cover with gauze   Secure with island dressing or paper tape  Elevate leg while sitting for prolonged periods of time  Verbal and written instructions given to patient  Return to clinic 04/14/2022

## 2022-04-05 NOTE — PROGRESS NOTES
Subjective:       Patient ID: Juan Carlos Turner is a 46 y.o. female.    Chief Complaint: Wound Check (Open wound of right thigh )    HPI  Review of Systems    Objective:      Physical Exam    Assessment:       1. Open wound of right thigh, initial encounter           Wound Right anterior Thigh (Active)        Pre-existing:    Primary Wound Type:    Side: Right   Orientation: anterior   Location: Thigh   Wound Number:    Ankle-Brachial Index:    Pulses:    Removal Indication and Assessment:    Wound Outcome:    (Retired) Wound Type:    (Retired) Wound Length (cm):    (Retired) Wound Width (cm):    (Retired) Depth (cm):    Wound Description (Comments):    Removal Indications:    Wound Image    04/05/22 1519   Dressing Appearance Dry;Intact;Clean 04/05/22 1519   Drainage Amount Scant 04/05/22 1519   Drainage Characteristics/Odor Serosanguineous 04/05/22 1519   Red (%), Wound Tissue Color 100 % 04/05/22 1519   Periwound Area Intact;Dry;Pink 04/05/22 1519   Wound Length (cm) 0.3 cm 04/05/22 1519   Wound Width (cm) 0.3 cm 04/05/22 1519   Wound Surface Area (cm^2) 0.09 cm^2 04/05/22 1519   Care Cleansed with:;Wound cleanser 04/05/22 1519   Dressing Applied;Other (comment);Gauze;Island/border 04/05/22 1519   Periwound Care Skin barrier film applied 04/05/22 1519   Dressing Change Due 04/14/22 04/05/22 1519     Juan Carlos was seen in wound care clinic today, placed in sitting position with legs elevated in treatment chair.  Dressing removed and right thigh wound cleaned with Vashe wound solution and dried thoroughly.  Wound bed beefy red in appearance with hypergranulation tissue noted.  Dr. Mcfarlane evaluated and treated patient's right thigh wound with application of silver nitrate to wound bed - patient tolerated procedure well.  Dr. Mcfarlane ordered skin prep to raffi-wound skin with dry gauze over wound bed covered with mepore island dressing.  Return to clinic 04/14/2022        Plan:       Right thigh wound dressed as detailed  above  Patient instructed not to get dressing wet  Patient may remove dressing tomorrow, if wound bed appears gray/black apply dry gauze dressing and secure with island dressing or paper tape and do not get dressing wet  If wound bed appears red and gray/black is gone - patient may shower with mild soap and water  Irrigate wound while in shower for 5-10 minutes  Pat dried thoroughly  Apply wound Hydrogel directly to wound bed  Cover with gauze   Secure with island dressing or paper tape  Elevate leg while sitting for prolonged periods of time  Verbal and written instructions given to patient  Return to clinic 04/14/2022

## 2022-04-14 ENCOUNTER — CLINICAL SUPPORT (OUTPATIENT)
Dept: WOUND CARE | Facility: CLINIC | Age: 47
End: 2022-04-14
Payer: MEDICARE

## 2022-04-14 VITALS
WEIGHT: 193.56 LBS | HEART RATE: 59 BPM | HEIGHT: 66 IN | DIASTOLIC BLOOD PRESSURE: 71 MMHG | BODY MASS INDEX: 31.11 KG/M2 | SYSTOLIC BLOOD PRESSURE: 119 MMHG | TEMPERATURE: 98 F

## 2022-04-14 DIAGNOSIS — S71.101A OPEN WOUND OF RIGHT THIGH, INITIAL ENCOUNTER: Primary | ICD-10-CM

## 2022-04-14 PROCEDURE — 99999 PR PBB SHADOW E&M-EST. PATIENT-LVL IV: CPT | Mod: PBBFAC,,,

## 2022-04-14 PROCEDURE — 99999 PR PBB SHADOW E&M-EST. PATIENT-LVL IV: ICD-10-PCS | Mod: PBBFAC,,,

## 2022-04-14 NOTE — PATIENT INSTRUCTIONS
Right thigh open to air - no covering needed  Apply Aquaphor daily to right thigh  Avoid direct sunlight  Do not vigorously scrub or scratch area  Return to clinic PRN - patient discharged per Dr. Mcfarlane

## 2022-04-14 NOTE — PROGRESS NOTES
Subjective:       Patient ID: Juan Carlos Turner is a 46 y.o. female.    Chief Complaint: Wound Check (Open wound right thigh)    HPI  Review of Systems    Objective:      Physical Exam    Assessment:       1. Open wound of right thigh, initial encounter           Wound Right anterior Thigh (Active)        Pre-existing:    Primary Wound Type:    Side: Right   Orientation: anterior   Location: Thigh   Wound Number:    Ankle-Brachial Index:    Pulses:    Removal Indication and Assessment:    Wound Outcome:    (Retired) Wound Type:    (Retired) Wound Length (cm):    (Retired) Wound Width (cm):    (Retired) Depth (cm):    Wound Description (Comments):    Removal Indications:    Wound Image   04/14/22 1149   Dressing Appearance Open to air 04/14/22 1149   Drainage Amount None 04/14/22 1149   Periwound Area Intact;Dry;[place] 04/14/22 1149   Care Cleansed with:;Soap and water 04/14/22 1149   Periwound Care Dry periwound area maintained 04/14/22 1149     Juan Carlos was seen in wound care clinic today, placed in sitting position on treatment chair.  Right thigh wound open to air, wound area washed with Easi-cleanse sponge and dried thoroughly.  Wound bed all pink with no open areas or drainage noted - Dr. Mcfarlane consulted on wound area - patient may leave open to air and apply Aquaphor daily.  Patient discharged.         Plan:       Right thigh open to air - no covering needed  Apply Aquaphor daily to right thigh  Avoid direct sunlight  Do not vigorously scrub or scratch area  Return to clinic PRN - patient discharged per Dr. Mcfarlane

## 2022-05-20 ENCOUNTER — OFFICE VISIT (OUTPATIENT)
Dept: SLEEP MEDICINE | Facility: CLINIC | Age: 47
End: 2022-05-20
Payer: MEDICARE

## 2022-05-20 VITALS
DIASTOLIC BLOOD PRESSURE: 89 MMHG | WEIGHT: 191 LBS | SYSTOLIC BLOOD PRESSURE: 141 MMHG | HEART RATE: 80 BPM | BODY MASS INDEX: 30.83 KG/M2

## 2022-05-20 DIAGNOSIS — G47.33 OSA (OBSTRUCTIVE SLEEP APNEA): Primary | ICD-10-CM

## 2022-05-20 DIAGNOSIS — G47.10 HYPERSOMNOLENCE: ICD-10-CM

## 2022-05-20 DIAGNOSIS — F51.09 OTHER INSOMNIA NOT DUE TO A SUBSTANCE OR KNOWN PHYSIOLOGICAL CONDITION: ICD-10-CM

## 2022-05-20 PROCEDURE — 99203 OFFICE O/P NEW LOW 30 MIN: CPT | Mod: S$GLB,,, | Performed by: INTERNAL MEDICINE

## 2022-05-20 PROCEDURE — 1159F PR MEDICATION LIST DOCUMENTED IN MEDICAL RECORD: ICD-10-PCS | Mod: CPTII,S$GLB,, | Performed by: INTERNAL MEDICINE

## 2022-05-20 PROCEDURE — 3008F BODY MASS INDEX DOCD: CPT | Mod: CPTII,S$GLB,, | Performed by: INTERNAL MEDICINE

## 2022-05-20 PROCEDURE — 3079F PR MOST RECENT DIASTOLIC BLOOD PRESSURE 80-89 MM HG: ICD-10-PCS | Mod: CPTII,S$GLB,, | Performed by: INTERNAL MEDICINE

## 2022-05-20 PROCEDURE — 3077F PR MOST RECENT SYSTOLIC BLOOD PRESSURE >= 140 MM HG: ICD-10-PCS | Mod: CPTII,S$GLB,, | Performed by: INTERNAL MEDICINE

## 2022-05-20 PROCEDURE — 3079F DIAST BP 80-89 MM HG: CPT | Mod: CPTII,S$GLB,, | Performed by: INTERNAL MEDICINE

## 2022-05-20 PROCEDURE — 3077F SYST BP >= 140 MM HG: CPT | Mod: CPTII,S$GLB,, | Performed by: INTERNAL MEDICINE

## 2022-05-20 PROCEDURE — 3044F PR MOST RECENT HEMOGLOBIN A1C LEVEL <7.0%: ICD-10-PCS | Mod: CPTII,S$GLB,, | Performed by: INTERNAL MEDICINE

## 2022-05-20 PROCEDURE — 99203 PR OFFICE/OUTPT VISIT, NEW, LEVL III, 30-44 MIN: ICD-10-PCS | Mod: S$GLB,,, | Performed by: INTERNAL MEDICINE

## 2022-05-20 PROCEDURE — 4010F ACE/ARB THERAPY RXD/TAKEN: CPT | Mod: CPTII,S$GLB,, | Performed by: INTERNAL MEDICINE

## 2022-05-20 PROCEDURE — 99999 PR PBB SHADOW E&M-EST. PATIENT-LVL III: ICD-10-PCS | Mod: PBBFAC,,, | Performed by: INTERNAL MEDICINE

## 2022-05-20 PROCEDURE — 1159F MED LIST DOCD IN RCRD: CPT | Mod: CPTII,S$GLB,, | Performed by: INTERNAL MEDICINE

## 2022-05-20 PROCEDURE — 4010F PR ACE/ARB THEARPY RXD/TAKEN: ICD-10-PCS | Mod: CPTII,S$GLB,, | Performed by: INTERNAL MEDICINE

## 2022-05-20 PROCEDURE — 99999 PR PBB SHADOW E&M-EST. PATIENT-LVL III: CPT | Mod: PBBFAC,,, | Performed by: INTERNAL MEDICINE

## 2022-05-20 PROCEDURE — 3008F PR BODY MASS INDEX (BMI) DOCUMENTED: ICD-10-PCS | Mod: CPTII,S$GLB,, | Performed by: INTERNAL MEDICINE

## 2022-05-20 PROCEDURE — 3044F HG A1C LEVEL LT 7.0%: CPT | Mod: CPTII,S$GLB,, | Performed by: INTERNAL MEDICINE

## 2022-05-20 NOTE — PROGRESS NOTES
ESTABLISHED PATIENT VISIT (4.11.2019: NP jenna)    Juan Carlos Turner  is a pleasant 46 y.o. female  with history of  AR, HTN, DM, vit D Def, HTN, JOSUE on CPAP who presents for managment    Here today for: management of JOSUE    Plan last visit :      Since last visit:     She reports wearing the machine the machine nightly with good benefit prior to the recall  She has registered her machine for the recall    PAP history   Problems Machine recalled   Mask [place] (large leak on download)   Pressure  6-20   Benefit Sleeps better   DME HME   Machine age 1/18/2019   Download 5.19.22:  no usage past 90 days       SLEEP SCHEDULE   Bed Time 1-2AM   Sleep Latency 5-10min   Arousals 3   Nocturia 3   Back to sleep Takes awhile   Wake time 5-6AM   Naps several   Work        Past Medical History:   Diagnosis Date    Anxiety     Asthma     DDD (degenerative disc disease), cervical     Diabetes mellitus     Hypertension     JOSUE on CPAP      Patient Active Problem List   Diagnosis    Allergic rhinitis due to pollen    Hematuria, microscopic    Moderate persistent asthma    Anemia    Fatigue    Unspecified abnormalities of breathing    Morbid (severe) obesity with alveolar hypoventilation    JOSUE (obstructive sleep apnea)    Lumbar disc herniation with radiculopathy    Vascular claudication    Type 2 diabetes mellitus with peripheral neuropathy    PVD (peripheral vascular disease) with claudication    Class 2 obesity due to excess calories without serious comorbidity with body mass index (BMI) of 39.0 to 39.9 in adult    Hypertension    JOSUE on CPAP    Morbid obesity    Vitamin D deficiency, unspecified    SOB (shortness of breath)    Right knee pain    Lateral meniscus derangement, left    Old tear of lateral meniscus of right knee    Decreased ROM of right knee    Weakness of both lower extremities    Impaired gait and mobility    Impaired functional mobility and activity tolerance    Chronic  pain of right knee    Post-traumatic wound infection    Major depressive disorder, recurrent episode, in partial remission    Open wound of right thigh       Current Outpatient Medications:     albuterol (PROVENTIL) 2.5 mg /3 mL (0.083 %) nebulizer solution, Take 2.5 mg by nebulization every 4 to 6 hours as needed for Wheezing or Shortness of Breath., Disp: , Rfl: 3    amitriptyline (ELAVIL) 50 MG tablet, Take 50 mg by mouth nightly., Disp: , Rfl:     amlodipine (NORVASC) 10 MG tablet, Take 10 mg by mouth once daily., Disp: , Rfl:     aspirin (ECOTRIN) 81 MG EC tablet, Take 1 tablet (81 mg total) by mouth once daily., Disp: 30 tablet, Rfl: 1    atorvastatin (LIPITOR) 40 MG tablet, Take 40 mg by mouth once daily., Disp: , Rfl:     citalopram (CELEXA) 20 MG tablet, Take 20 mg by mouth once daily., Disp: , Rfl:     fluticasone propionate (FLONASE) 50 mcg/actuation nasal spray, 1 spray (50 mcg total) by Each Nostril route 2 (two) times daily as needed. (Patient taking differently: 1 spray by Each Nostril route 2 (two) times daily as needed for Rhinitis or Allergies.), Disp: 15 g, Rfl: 0    ketotifen (ZADITOR) 0.025 % (0.035 %) ophthalmic solution, INSTILL ONE DROP IN AFFECTED EYE ONCE A DAY AS NEEDED FOR ITCHY EYES, Disp: , Rfl:     losartan (COZAAR) 25 MG tablet, Take 25 mg by mouth once daily., Disp: , Rfl:     meloxicam (MOBIC) 15 MG tablet, Take 15 mg by mouth once daily., Disp: , Rfl:     metFORMIN (GLUCOPHAGE) 1000 MG tablet, Take 1,000 mg by mouth daily with breakfast., Disp: , Rfl:     methocarbamoL (ROBAXIN) 500 MG Tab, Take 500 mg by mouth 2 (two) times daily., Disp: , Rfl:     multivitamin capsule, Take 1 capsule by mouth once daily., Disp: , Rfl:     ondansetron (ZOFRAN-ODT) 8 MG TbDL, Take 8 mg by mouth every 8 (eight) hours as needed., Disp: , Rfl:     ONETOUCH DELICA LANCETS 33 gauge Misc, TEST ONCE D, Disp: , Rfl: 3    ONETOUCH ULTRA BLUE TEST STRIP Strp, TEST ONCE D BEFORE BREAKFAST,  Disp: , Rfl: 3    ONETOUCH ULTRAMINI kit, UTD, Disp: , Rfl: 0    oxyCODONE (ROXICODONE) 10 mg Tab immediate release tablet, Take 1 tablet (10 mg total) by mouth every 12 (twelve) hours as needed for Pain., Disp: 8 tablet, Rfl: 0    pregabalin (LYRICA) 150 MG capsule, Take 150 mg by mouth 2 (two) times daily., Disp: , Rfl:     VENTOLIN HFA 90 mcg/actuation inhaler, Inhale 1-2 puffs into the lungs every 4 to 6 hours as needed for Wheezing or Shortness of Breath., Disp: , Rfl:        Vitals:    22 0911   BP: (!) 141/89   Pulse: 80   Weight: 86.6 kg (191 lb)     Physical Exam:    GEN:   Well-appearing  Psych:  Appropriate affect, demonstrates insight  SKIN:  No rash on the face or bridge of the nose    LABS:  Lab Results   Component Value Date    HGB 7.6 (L) 2022    CO2 27 2022         RECORDS REVIEWED PREVIOUSLY:     2018 HST AHI 13, RDI 42, low O2 82.6% % time < 90% SpO2 0.3%;    PROBLEM DESCRIPTION/ Sx on Presentation Interval Hx STATUS   JOSUE   Mild to severe based on HST Machine is under recall New to me     Daytime Sx   + sleepiness when inactive   denies sleepiness when driving   ESS  on intake Still very sleepy during the day   New to me   Insomnia   Waking frequently, hard to get back to sleep  Prior hypnotics:        Current hypnotics:    Waking frequently New to me   Nocturia   x 3 per sleep period Still frequent New to me   Other issues: anemia    PLAN     -we discussed the recall, she has registered her machine  -unable to afford machine out of pocket and insurance will not purchase new machine until 5 years   -using and benefitting from PAP therapy when she has a functioning machine    RTC once she has received a new machibne

## 2022-07-09 PROBLEM — H11.32 SUBCONJUNCTIVAL HEMORRHAGE OF LEFT EYE: Status: ACTIVE | Noted: 2022-07-09

## 2022-07-09 PROBLEM — H10.32 ACUTE CONJUNCTIVITIS OF LEFT EYE: Status: ACTIVE | Noted: 2022-07-09

## 2023-08-29 ENCOUNTER — TELEPHONE (OUTPATIENT)
Dept: VASCULAR SURGERY | Facility: CLINIC | Age: 48
End: 2023-08-29
Payer: MEDICARE
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
## 2023-08-30 ENCOUNTER — TELEPHONE (OUTPATIENT)
Dept: VASCULAR SURGERY | Facility: CLINIC | Age: 48
End: 2023-08-30
Payer: MEDICARE

## 2023-08-30 NOTE — TELEPHONE ENCOUNTER
Spoke to the pt, Pt seen Money while he was helping wound care out when no provider was available. Pt was instructed to call her neuro provider and or her PCP for leg pain . She understoond and the call ended

## 2023-08-30 NOTE — TELEPHONE ENCOUNTER
----- Message from Rocío Garzon sent at 8/30/2023  8:05 AM CDT -----  Regarding: appt  Contact: pt [phone]  PATIENTCALL     Pt is calling to speak with someone in provider office regarding her scheduling an appt she states she received a call yesterday and it might was about scheduling an appt she states if she don't answer just schedule the appt an leave a message on her voice mail she is at work and may not be able to answer. She is asking for a return call please call pt at  [phone]

## 2023-10-07 ENCOUNTER — HOSPITAL ENCOUNTER (OUTPATIENT)
Dept: RADIOLOGY | Facility: OTHER | Age: 48
Discharge: HOME | End: 2023-10-07
Attending: PHYSICAL MEDICINE & REHABILITATION
Payer: MEDICARE

## 2023-10-07 DIAGNOSIS — M54.50 LOW BACK PAIN: ICD-10-CM

## 2023-10-07 PROCEDURE — 72148 MRI LUMBAR SPINE W/O DYE: CPT | Mod: 26,,, | Performed by: RADIOLOGY

## 2023-10-07 PROCEDURE — 72148 MRI LUMBAR SPINE W/O DYE: CPT | Mod: TC

## 2023-10-07 PROCEDURE — 72148 MRI LUMBAR SPINE WITHOUT CONTRAST: ICD-10-PCS | Mod: 26,,, | Performed by: RADIOLOGY

## 2024-02-22 ENCOUNTER — TELEPHONE (OUTPATIENT)
Dept: PODIATRY | Facility: CLINIC | Age: 49
End: 2024-02-22

## 2024-02-22 ENCOUNTER — OFFICE VISIT (OUTPATIENT)
Dept: PODIATRY | Facility: CLINIC | Age: 49
End: 2024-02-22
Payer: MEDICARE

## 2024-02-22 VITALS
HEIGHT: 66 IN | BODY MASS INDEX: 31.02 KG/M2 | DIASTOLIC BLOOD PRESSURE: 115 MMHG | SYSTOLIC BLOOD PRESSURE: 167 MMHG | HEART RATE: 66 BPM | WEIGHT: 193 LBS

## 2024-02-22 DIAGNOSIS — L03.039 PARONYCHIA OF TOE, UNSPECIFIED LATERALITY: ICD-10-CM

## 2024-02-22 DIAGNOSIS — M79.675 TOE PAIN, BILATERAL: ICD-10-CM

## 2024-02-22 DIAGNOSIS — M79.674 TOE PAIN, BILATERAL: ICD-10-CM

## 2024-02-22 DIAGNOSIS — L60.0 INGROWN NAIL: Primary | ICD-10-CM

## 2024-02-22 DIAGNOSIS — B35.3 TINEA PEDIS, RIGHT: ICD-10-CM

## 2024-02-22 PROCEDURE — 3080F DIAST BP >= 90 MM HG: CPT | Mod: CPTII,S$GLB,, | Performed by: PODIATRIST

## 2024-02-22 PROCEDURE — 4010F ACE/ARB THERAPY RXD/TAKEN: CPT | Mod: CPTII,S$GLB,, | Performed by: PODIATRIST

## 2024-02-22 PROCEDURE — 99999 PR PBB SHADOW E&M-EST. PATIENT-LVL V: CPT | Mod: PBBFAC,,, | Performed by: PODIATRIST

## 2024-02-22 PROCEDURE — 3008F BODY MASS INDEX DOCD: CPT | Mod: CPTII,S$GLB,, | Performed by: PODIATRIST

## 2024-02-22 PROCEDURE — 3077F SYST BP >= 140 MM HG: CPT | Mod: CPTII,S$GLB,, | Performed by: PODIATRIST

## 2024-02-22 PROCEDURE — 1160F RVW MEDS BY RX/DR IN RCRD: CPT | Mod: CPTII,S$GLB,, | Performed by: PODIATRIST

## 2024-02-22 PROCEDURE — 1159F MED LIST DOCD IN RCRD: CPT | Mod: CPTII,S$GLB,, | Performed by: PODIATRIST

## 2024-02-22 PROCEDURE — 99204 OFFICE O/P NEW MOD 45 MIN: CPT | Mod: S$GLB,,, | Performed by: PODIATRIST

## 2024-02-22 RX ORDER — ESCITALOPRAM OXALATE 20 MG/1
20 TABLET ORAL
COMMUNITY

## 2024-02-22 RX ORDER — ESCITALOPRAM OXALATE 20 MG/1
TABLET ORAL
COMMUNITY
Start: 2023-02-03

## 2024-02-22 RX ORDER — HYDROXYZINE HYDROCHLORIDE 25 MG/1
TABLET, FILM COATED ORAL
COMMUNITY
Start: 2023-01-20

## 2024-02-22 RX ORDER — TIZANIDINE 4 MG/1
4 TABLET ORAL 2 TIMES DAILY
COMMUNITY

## 2024-02-22 RX ORDER — OMEPRAZOLE 20 MG/1
1 CAPSULE, DELAYED RELEASE ORAL DAILY
COMMUNITY
Start: 2023-11-16

## 2024-02-22 RX ORDER — TOBRAMYCIN 3 MG/ML
SOLUTION/ DROPS OPHTHALMIC
Qty: 5 ML | Refills: 3 | Status: SHIPPED | OUTPATIENT
Start: 2024-02-22

## 2024-02-22 RX ORDER — CICLOPIROX 7.7 MG/G
GEL TOPICAL 2 TIMES DAILY
Qty: 100 G | Refills: 3 | Status: SHIPPED | OUTPATIENT
Start: 2024-02-22

## 2024-02-22 NOTE — TELEPHONE ENCOUNTER
Staff informed [facility] patient is using tobramycin sulfate 0.3% (TOBREX) 0.3 % ophthalmic solution for the toe.      [facility] verbalized understanding.    ----- Message from Jacob Baker sent at 2/22/2024 10:03 AM CST -----  Name of Who is Calling: [facility] - [facility] [address] on behalf of CONNOR PERKINS MAY [[id number]]            What is the request in detail: Patient is requesting call back about prior auth for following   tobramycin sulfate 0.3% (TOBREX) 0.3 % ophthalmic solution             Can the clinic reply by [facility]: no              What Number to Call Back : [phone] [facility]

## 2024-02-22 NOTE — PROGRESS NOTES
Subjective:      Patient ID: Juan Carlos Turner is a 48 y.o. female.    Chief Complaint: Foot Problem (Swelling in foot and foot fungus right)    Sharp, throbbing pain both big toe/nails.  Gradual onset, worsening over the past week.  Aggravated by increased weight-bearing prolonged standing some shoes.  No prior medical treatment.  No self-treatment.  Denies trauma and surgery both feet.      Cc2 dry skin with flaking and itching between the toes of the right foot.  Longstanding condition present for years.  This exacerbations been gradual onset worsening over the past week or 2.  Aggravated by increased time spent in shoes.  No prior medical treatment.  No self-treatment.  Denies trauma and surgery both feet.    Chief Complaint   Patient presents with    Foot Problem     Swelling in foot and foot fungus right       Casual shoes both feet.    Review of Systems   Constitutional: Negative for chills, diaphoresis, fever, malaise/fatigue and night sweats.   Cardiovascular:  Negative for claudication, cyanosis, leg swelling and syncope.   Skin:  Positive for dry skin, itching and nail changes. Negative for color change, rash, suspicious lesions and unusual hair distribution.   Musculoskeletal:  Negative for falls, joint pain, joint swelling, muscle cramps, muscle weakness and stiffness.   Gastrointestinal:  Negative for constipation, diarrhea, nausea and vomiting.   Neurological:  Negative for brief paralysis, disturbances in coordination, focal weakness, numbness, paresthesias, sensory change and tremors.         Objective:      Physical Exam  Constitutional:       General: She is not in acute distress.     Appearance: She is well-developed. She is not diaphoretic.   Cardiovascular:      Pulses:           Popliteal pulses are 2+ on the right side and 2+ on the left side.        Dorsalis pedis pulses are 2+ on the right side and 2+ on the left side.        Posterior tibial pulses are 2+ on the right side and 2+ on the  left side.      Comments: Capillary refill 3 seconds all toes/distal feet, all toes/both feet warm to touch.      Negative lymphadenopathy bilateral popliteal fossa and tarsal tunnel.      Negavie lower extremity edema bilateral.    Musculoskeletal:      Right ankle: No swelling, deformity, ecchymosis or lacerations. Normal range of motion. Normal pulse.      Right Achilles Tendon: Normal. No defects. Canela's test negative.      Comments: Normal angle, base, station of gait. All ten toes without clubbing, cyanosis, or signs of ischemia.  No pain to palpation bilateral lower extremities.  Range of motion, stability, muscle strength, and muscle tone normal bilateral feet and legs.    Lymphadenopathy:      Lower Body: No right inguinal adenopathy. No left inguinal adenopathy.      Comments: Negative lymphadenopathy bilateral popliteal fossa and tarsal tunnel.    Negative lymphangitic streaking bilateral feet/ankles/legs.   Skin:     General: Skin is warm and dry.      Capillary Refill: Capillary refill takes 2 to 3 seconds.      Coloration: Skin is not pale.      Findings: No abrasion, bruising, burn, ecchymosis, erythema, laceration, lesion or rash.      Nails: There is no clubbing.      Comments:   Dry scale with superficial flakes over an erythematous base  between toes right foot without ulceration, drainage, pus, tracking, fluctuance, malodor, or cardinal signs infection.    Visible and palpable ingrowth of toenail lateral and medial border right and left hallux with pain to palpation, and focal localized erythema and edema,  without ulceration, drainage, pus, tracking, fluctuance, malodor, or cardinal signs infection.    Otherwise, Skin is normal age and health appropriate color, turgor, texture, and temperature bilateral lower extremities without ulceration, hyperpigmentation, discoloration, masses nodules or cords palpated.  No ecchymosis, erythema, edema, or cardinal signs of infection bilateral lower  extremities.      Neurological:      Mental Status: She is alert and oriented to person, place, and time.      Sensory: No sensory deficit.      Motor: No tremor, atrophy or abnormal muscle tone.      Gait: Gait normal.      Comments: Negative tinel sign to percussion sural, superficial peroneal, deep peroneal, saphenous, and posterior tibial nerves right and left ankles and feet.     Psychiatric:         Behavior: Behavior is cooperative.           Assessment:       Encounter Diagnoses   Name Primary?    Ingrown nail Yes    Toe pain, bilateral     Paronychia of toe, unspecified laterality     Tinea pedis, right          Plan:       Juan Carlos was seen today for foot problem.    Diagnoses and all orders for this visit:    Ingrown nail    Toe pain, bilateral    Paronychia of toe, unspecified laterality    Tinea pedis, right    Other orders  -     ciclopirox 0.77 % Gel; Apply topically 2 (two) times daily.  -     tobramycin sulfate 0.3% (TOBREX) 0.3 % ophthalmic solution; 1-2 drops topically twice daily to affected toe(s).      I counseled the patient on her conditions, their implications and medical management.        Topical tobramycin drops twice daily both hallux.      Cover both hallux all times with Band-Aid or similar changing daily.      Discussed conservative treatment with shoes of adequate dimensions, material, and style to alleviate symptoms and delay or prevent surgical intervention.    With the patient's permission and after time-out in which all patient identifiers procedures and site markings were noted to be in agreement, I used a sterile nail Nipper to remove the offending portions nail from the medial and lateral borders of the right and left hallux.    Patient noted relief to afterward.      Topical Loprox gel twice daily the interspaces of the right forefoot.          No follow-ups on file.

## 2024-02-22 NOTE — TELEPHONE ENCOUNTER
Staff spoke with [facility] was told a Prior Authorization needed to be done through [employer] do to needing more information.    Staff verbalized understanding.      ----- Message from Earline Fox sent at 2/22/2024 10:37 AM CST -----  Regarding: Pharmacy Authorization            Name of Who is Calling: [facility]    Who Left The Message: [facility]      What is the request in detail:      Prior Authorization is needed for the medication  tobramycin sulfate 0.3% (TOBREX) 0.3 % ophthalmic solution.  Please further advise.   Thank you           Preferred Pharmacy:  [facility] - [place] [address]   Phone: [phone]  Fax: [phone]  Prior Authorization:  [id number]

## 2024-02-26 ENCOUNTER — TELEPHONE (OUTPATIENT)
Dept: PODIATRY | Facility: CLINIC | Age: 49
End: 2024-02-26
Payer: MEDICARE

## 2024-03-27 ENCOUNTER — TELEPHONE (OUTPATIENT)
Dept: PODIATRY | Facility: CLINIC | Age: 49
End: 2024-03-27
Payer: MEDICARE

## 2024-03-27 NOTE — TELEPHONE ENCOUNTER
Spoke with pt informing her I've spoken with [facility] rep and rep has faxed over PA  (Tobramycin drops) to office which can take up to 24 hrs. PT TORIBIO

## 2024-05-10 DIAGNOSIS — M79.2 NERVE PAIN: Primary | ICD-10-CM

## 2024-05-13 ENCOUNTER — TELEPHONE (OUTPATIENT)
Dept: ENDOSCOPY | Facility: HOSPITAL | Age: 49
End: 2024-05-13
Payer: MEDICARE

## 2024-05-13 NOTE — TELEPHONE ENCOUNTER
Telephoned pt to schedule colonoscopy from outside referral. Spoke with pt and appts offered at [facility] and [place] locations, however pt refused and states she wants to be scheduled at [facility] only due to ride concerns.  Explained to patient we are awaiting the August schedule to open, however she can call back to check to see if availability arises sooner.  Direct contact number and Endoscopy Scheduling number provided. Will follow up with pt.

## 2024-05-14 ENCOUNTER — CLINICAL SUPPORT (OUTPATIENT)
Dept: REHABILITATION | Facility: OTHER | Age: 49
End: 2024-05-14
Payer: MEDICARE

## 2024-05-14 DIAGNOSIS — R29.898 RIGHT LEG WEAKNESS: ICD-10-CM

## 2024-05-14 DIAGNOSIS — M54.16 LUMBAR RADICULOPATHY: Primary | ICD-10-CM

## 2024-05-14 PROCEDURE — 97530 THERAPEUTIC ACTIVITIES: CPT | Mod: PN | Performed by: PHYSICAL THERAPIST

## 2024-05-14 PROCEDURE — 97162 PT EVAL MOD COMPLEX 30 MIN: CPT | Mod: PN | Performed by: PHYSICAL THERAPIST
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
## 2024-05-16 ENCOUNTER — CLINICAL SUPPORT (OUTPATIENT)
Dept: REHABILITATION | Facility: OTHER | Age: 49
End: 2024-05-16
Payer: MEDICARE

## 2024-05-16 ENCOUNTER — DOCUMENTATION ONLY (OUTPATIENT)
Dept: REHABILITATION | Facility: OTHER | Age: 49
End: 2024-05-16

## 2024-05-16 DIAGNOSIS — M54.16 LUMBAR RADICULOPATHY: Primary | ICD-10-CM

## 2024-05-16 PROCEDURE — 97112 NEUROMUSCULAR REEDUCATION: CPT | Mod: PN,CQ

## 2024-05-16 PROCEDURE — 97530 THERAPEUTIC ACTIVITIES: CPT | Mod: PN,CQ

## 2024-05-16 NOTE — PROGRESS NOTES
"[facility]   Physical Therapy Treatment Note      Name: Juan Carlos Turner  Clinic Number: [id number]    Therapy Diagnosis:   Encounter Diagnosis   Name Primary?    Lumbar radiculopathy Yes     Physician: Marilyn Luu PA    Visit Date: 5/16/2024    Physician Orders: PT Eval and Treat   Medical Diagnosis from Referral: M79.2 (ICD-10-CM) - Nerve pain  Evaluation Date: 5/14/2024  Authorization Period Expiration: 5/10/25  Plan of Care Expiration: 8/9/2024  Progress Note Due: 6/9/2024  Date of Surgery: n/a  Visit # / Visits authorized: 2/21   FOTO: 1/ 3     Precautions: Standard      Time In: 2:15 pm   Time Out: 2:55 pm   Total Billable Time: 40 minutes    PTA Visit #: 1/5       Subjective     Patient reports: her symptoms are "unchanged." States she has been keeping up with exercises and they have been helping. States she did not have numbness last night, and was able to sleep through the night.     She was compliant with home exercise program.  Response to previous treatment: good, eval   Functional change: improvement in symptoms when sleeping     Pain: 3/10  Location:  L hip, L anterior hip down to foot     Objective      Objective Measures updated at progress report unless specified.     Treatment     Juan Carlos received the treatments listed below:      therapeutic exercises to develop strength, endurance, ROM, flexibility, posture, and core stabilization for 00 minutes including:    manual therapy techniques: Joint mobilizations, Myofacial release, Soft tissue Mobilization, and Friction Massage were applied to the:  for 00 minutes, including:    neuromuscular re-education activities to improve: Balance, Coordination, Kinesthetic, Sense, Proprioception, and Posture for 15 minutes. The following activities were included:  Supine TA contraction 10 x 10"   Supine TA contraction + BKFO 2 x 10 ea     therapeutic activities to improve functional performance for 25  minutes, including:  + " reviewed and educated pt on HEP   Super set :: x 3    THEODORE x 1'    PPU x 10   Prone hip extension 3 x 10 (knee straight)   Prone hip extension 3 x 10 (knee bent)   Swimmers Alternating arms 2 x 10   Standing lumbar extensions 2 x 10     Patient Education and Home Exercises       Education provided:   - Reviewed and educated pt on HEP     Written Home Exercises Provided: Patient instructed to cont prior HEP. Exercises were reviewed and Juan Carlos was able to demonstrate them prior to the end of the session.  Juan Carlos demonstrated good  understanding of the education provided. See Electronic Medical Record under Patient Instructions for exercises provided during therapy sessions    Assessment     Juan Carlos had good tolerance to initial treatment with no adverse effects. Observed decreased aerobic and mm endurance deficits with all exercises today. Pt had good TA motor control with minimal verbal cues to decrease rectus abdominis substitutions, which pt was able to correct after cued. Good training effect achieved post session. Continue to monitor and progress pt as tolerated.     Juan Carlos Is progressing well towards her goals.   Patient prognosis is Good.     Patient will continue to benefit from skilled outpatient physical therapy to address the deficits listed in the problem list box on initial evaluation, provide pt/family education and to maximize pt's level of independence in the home and community environment.     Patient's spiritual, cultural and educational needs considered and pt agreeable to plan of care and goals.     Anticipated barriers to physical therapy:  chronicity of condition, prior tx with limited perceived benefit     Goals: updated 05/16/2024   Short Term Goals (4 Weeks):   1. Pt will report 20% reduction in pain of the lumbar spine and R LE for ease with ADL's. (Progressing, not met)   2. PT will demonstrate improved upright posture with minimal cuing for ease with functional positioning in home and  community.(Progressing, not met)   3. Pt will demonstrate improved lumbar spine ROM in all directions by 10% for ease with bending activities. (Progressing, not met)   4. Pt to demonstrate improved functional ability with FOTO score >=50% .(Progressing, not met)      Long Term Goals (12 Weeks):   1. Pt will report being independent with HEP for maintenance of improvements gained during therapy sessions(Progressing, not met)   2. PT will report >50% reduction of pain of the back and R LE for ease with return to work. (Progressing, not met)   3. Pt will demonstrate trunk and extremity strength to >=4+/5 without the provocation of pain for ease with community mobility and return to work.(Progressing, not met)   4. Pt will demonstrate appropriate upright posture without external cueing for ease with community ambulation. (Progressing, not met)   5. Pt to demonstrate improved functional ability with FOTO score >=58% .(Progressing, not met)      Plan      Plan of care Certification: 5/14/2024 to 8/9/2024.    Continue pt's current POC to reduce pain and improve lumbar and B LE strength and ROM.     Lynn Sethi, PTA

## 2024-05-16 NOTE — PROGRESS NOTES
Physical Therapist and Physical Therapist Assistant met face to face to discuss patient's treatment plan and progress towards established goals. Pt will be seen by a physical therapist minimally every 6th visit or every 30 days.    Lynn Sethi, NIA  05/16/2024

## 2024-05-23 ENCOUNTER — CLINICAL SUPPORT (OUTPATIENT)
Dept: REHABILITATION | Facility: OTHER | Age: 49
End: 2024-05-23
Payer: MEDICARE

## 2024-05-23 DIAGNOSIS — M54.16 LUMBAR RADICULOPATHY: Primary | ICD-10-CM

## 2024-05-23 PROCEDURE — 97140 MANUAL THERAPY 1/> REGIONS: CPT | Mod: PN,CQ

## 2024-05-23 PROCEDURE — 97112 NEUROMUSCULAR REEDUCATION: CPT | Mod: PN,CQ

## 2024-05-23 PROCEDURE — 97530 THERAPEUTIC ACTIVITIES: CPT | Mod: PN,CQ

## 2024-05-23 NOTE — PROGRESS NOTES
"[facility]   Physical Therapy Treatment Note      Name: Juan Carlos Turner  Clinic Number: [id number]    Therapy Diagnosis:   Encounter Diagnosis   Name Primary?    Lumbar radiculopathy Yes       Physician: Marilyn Luu PA    Visit Date: 5/23/2024    Physician Orders: PT Eval and Treat   Medical Diagnosis from Referral: M79.2 (ICD-10-CM) - Nerve pain  Evaluation Date: 5/14/2024  Authorization Period Expiration: 5/10/25  Plan of Care Expiration: 8/9/2024  Progress Note Due: 6/9/2024  Date of Surgery: n/a  Visit # / Visits authorized: 3/21   FOTO: 1/ 3     Precautions: Standard      Time In: 2:17 pm   Time Out: 2:55 pm   Total Billable Time: 38 minutes    PTA Visit #: 2/5       Subjective     Patient reports: "it hurts everyday period. I am so used to it." States she was okay two days after the last visit, and then the weekend she sat and ate food for her sister's birthday. States on Sunday she didn't do anything.     She was compliant with home exercise program.  Response to previous treatment: good   Functional change: improvement in symptoms when sleeping     Pain: 5/10   Location:  L hip, L anterior hip down to foot     Objective      Objective Measures updated at progress report unless specified.     Treatment     Juan Carlos received the treatments listed below:      therapeutic exercises to develop strength, endurance, ROM, flexibility, posture, and core stabilization for 00 minutes including:    manual therapy techniques: Joint mobilizations, Myofacial release, Soft tissue Mobilization, and Friction Massage were applied to the:  for 08 minutes, including:  + Hypervolt and manual STM to B glutes, piriformis, and QL     neuromuscular re-education activities to improve: Balance, Coordination, Kinesthetic, Sense, Proprioception, and Posture for 10 minutes. The following activities were included:  Supine TA contraction 10 x 10"   Supine TA contraction + BKFO 2 x 10 ea   + piriformis " "stretch 3 x 30" B   Prone hip extension 3 x 10 (knee straight) (added to HEP)     therapeutic activities to improve functional performance for 20 minutes, including:  + reviewed and educated pt on HEP, Educated pt on centralization, educated on extension biased and strengthening treatment   Super set :: x 3    THEODORE x 2'    PPU x 10     Prone hip extension 3 x 10 (knee bent)   Swimmers Alternating arms 2 x 10   Standing lumbar extensions 2 x 10     Patient Education and Home Exercises       Education provided:   - Reviewed and educated pt on HEP   + added prone hip extension to HEP     Written Home Exercises Provided: Patient instructed to cont prior HEP. Exercises were reviewed and Juan Carlos was able to demonstrate them prior to the end of the session.  Juan Carlos demonstrated good  understanding of the education provided. See Electronic Medical Record under Patient Instructions for exercises provided during therapy sessions    Assessment     Juan Carlos seems to be compliant with HEP. Therefore, added prone hip extensions, which pt demo good understanding. Pt noted centralization with prone hip extensions, but did have increased pain at spine post exercise. Initiated Hypervolt and manual STM, which achieved good myofascial release and pt noted decrease in pain from 5/10 to 3/10 post session. Continue to monitor and progress pt as tolerated.     Juan Carlos Is progressing well towards her goals.   Patient prognosis is Good.     Patient will continue to benefit from skilled outpatient physical therapy to address the deficits listed in the problem list box on initial evaluation, provide pt/family education and to maximize pt's level of independence in the home and community environment.     Patient's spiritual, cultural and educational needs considered and pt agreeable to plan of care and goals.     Anticipated barriers to physical therapy:  chronicity of condition, prior tx with limited perceived benefit     Goals: updated " 05/23/2024   Short Term Goals (4 Weeks):   1. Pt will report 20% reduction in pain of the lumbar spine and R LE for ease with ADL's. (Progressing, not met)   2. PT will demonstrate improved upright posture with minimal cuing for ease with functional positioning in home and community.(Progressing, not met)   3. Pt will demonstrate improved lumbar spine ROM in all directions by 10% for ease with bending activities. (Progressing, not met)   4. Pt to demonstrate improved functional ability with FOTO score >=50% .(Progressing, not met)      Long Term Goals (12 Weeks):   1. Pt will report being independent with HEP for maintenance of improvements gained during therapy sessions(Progressing, not met)   2. PT will report >50% reduction of pain of the back and R LE for ease with return to work. (Progressing, not met)   3. Pt will demonstrate trunk and extremity strength to >=4+/5 without the provocation of pain for ease with community mobility and return to work.(Progressing, not met)   4. Pt will demonstrate appropriate upright posture without external cueing for ease with community ambulation. (Progressing, not met)   5. Pt to demonstrate improved functional ability with FOTO score >=58% .(Progressing, not met)      Plan      Plan of care Certification: 5/14/2024 to 8/9/2024.    Continue pt's current POC to reduce pain and improve lumbar and B LE strength and ROM.     Lynn Setih, PTA

## 2024-05-28 ENCOUNTER — HOSPITAL ENCOUNTER (EMERGENCY)
Facility: HOSPITAL | Age: 49
Discharge: HOME | End: 2024-05-28
Attending: EMERGENCY MEDICINE | Admitting: STUDENT IN AN ORGANIZED HEALTH CARE EDUCATION/TRAINING PROGRAM
Payer: MEDICARE

## 2024-05-28 VITALS
HEART RATE: 63 BPM | SYSTOLIC BLOOD PRESSURE: 130 MMHG | DIASTOLIC BLOOD PRESSURE: 81 MMHG | HEIGHT: 66 IN | TEMPERATURE: 98 F | WEIGHT: 209 LBS | RESPIRATION RATE: 16 BRPM | BODY MASS INDEX: 33.59 KG/M2 | OXYGEN SATURATION: 98 %

## 2024-05-28 DIAGNOSIS — R55 SYNCOPE AND COLLAPSE: ICD-10-CM

## 2024-05-28 DIAGNOSIS — R55 SYNCOPE: ICD-10-CM

## 2024-05-28 DIAGNOSIS — M54.2 NECK PAIN: Primary | ICD-10-CM

## 2024-05-28 PROBLEM — S12.030A: Status: ACTIVE | Noted: 2024-05-28

## 2024-05-28 PROBLEM — S12.030A: Status: RESOLVED | Noted: 2024-05-28 | Resolved: 2024-05-28

## 2024-05-28 LAB
ALBUMIN SERPL BCP-MCNC: 3.5 G/DL (ref 3.5–5.2)
ALP SERPL-CCNC: 104 U/L (ref 55–135)
ALT SERPL W/O P-5'-P-CCNC: 24 U/L (ref 10–44)
ANION GAP SERPL CALC-SCNC: 10 MMOL/L (ref 8–16)
AST SERPL-CCNC: 22 U/L (ref 10–40)
B-HCG UR QL: NEGATIVE
BACTERIA #/AREA URNS AUTO: ABNORMAL /HPF
BASOPHILS # BLD AUTO: 0.02 K/UL (ref 0–0.2)
BASOPHILS NFR BLD: 0.4 % (ref 0–1.9)
BILIRUB SERPL-MCNC: 0.2 MG/DL (ref 0.1–1)
BILIRUB UR QL STRIP: NEGATIVE
BNP SERPL-MCNC: 29 PG/ML (ref 0–99)
BUN SERPL-MCNC: 11 MG/DL (ref 6–20)
CALCIUM SERPL-MCNC: 9.1 MG/DL (ref 8.7–10.5)
CHLORIDE SERPL-SCNC: 107 MMOL/L (ref 95–110)
CLARITY UR REFRACT.AUTO: CLEAR
CO2 SERPL-SCNC: 23 MMOL/L (ref 23–29)
COLOR UR AUTO: YELLOW
CREAT SERPL-MCNC: 0.9 MG/DL (ref 0.5–1.4)
CTP QC/QA: YES
DIFFERENTIAL METHOD BLD: ABNORMAL
EOSINOPHIL # BLD AUTO: 0.1 K/UL (ref 0–0.5)
EOSINOPHIL NFR BLD: 2.6 % (ref 0–8)
ERYTHROCYTE [DISTWIDTH] IN BLOOD BY AUTOMATED COUNT: 13.7 % (ref 11.5–14.5)
EST. GFR  (NO RACE VARIABLE): >60 ML/MIN/1.73 M^2
GLUCOSE SERPL-MCNC: 117 MG/DL (ref 70–110)
GLUCOSE UR QL STRIP: NEGATIVE
HCT VFR BLD AUTO: 37.2 % (ref 37–48.5)
HCT VFR BLD CALC: 33 %PCV (ref 36–54)
HGB BLD-MCNC: 11 G/DL
HGB BLD-MCNC: 11.4 G/DL (ref 12–16)
HGB UR QL STRIP: NEGATIVE
IMM GRANULOCYTES # BLD AUTO: 0.02 K/UL (ref 0–0.04)
IMM GRANULOCYTES NFR BLD AUTO: 0.4 % (ref 0–0.5)
KETONES UR QL STRIP: NEGATIVE
LEUKOCYTE ESTERASE UR QL STRIP: ABNORMAL
LYMPHOCYTES # BLD AUTO: 2.2 K/UL (ref 1–4.8)
LYMPHOCYTES NFR BLD: 41 % (ref 18–48)
MAGNESIUM SERPL-MCNC: 1.8 MG/DL (ref 1.6–2.6)
MCH RBC QN AUTO: 26.5 PG (ref 27–31)
MCHC RBC AUTO-ENTMCNC: 30.6 G/DL (ref 32–36)
MCV RBC AUTO: 87 FL (ref 82–98)
MICROSCOPIC COMMENT: ABNORMAL
MONOCYTES # BLD AUTO: 0.4 K/UL (ref 0.3–1)
MONOCYTES NFR BLD: 7.6 % (ref 4–15)
NEUTROPHILS # BLD AUTO: 2.6 K/UL (ref 1.8–7.7)
NEUTROPHILS NFR BLD: 48 % (ref 38–73)
NITRITE UR QL STRIP: NEGATIVE
NRBC BLD-RTO: 0 /100 WBC
OHS QRS DURATION: 88 MS
OHS QTC CALCULATION: 423 MS
PH UR STRIP: 7 [PH] (ref 5–8)
PLATELET # BLD AUTO: 250 K/UL (ref 150–450)
PMV BLD AUTO: 12.2 FL (ref 9.2–12.9)
POC IONIZED CALCIUM: 1.23 MMOL/L (ref 1.06–1.42)
POTASSIUM BLD-SCNC: 3.6 MMOL/L (ref 3.5–5.1)
POTASSIUM SERPL-SCNC: 3.8 MMOL/L (ref 3.5–5.1)
PROT SERPL-MCNC: 6.8 G/DL (ref 6–8.4)
PROT UR QL STRIP: NEGATIVE
RBC # BLD AUTO: 4.3 M/UL (ref 4–5.4)
RBC #/AREA URNS AUTO: 2 /HPF (ref 0–4)
SAMPLE: ABNORMAL
SODIUM BLD-SCNC: 140 MMOL/L (ref 136–145)
SODIUM SERPL-SCNC: 140 MMOL/L (ref 136–145)
SP GR UR STRIP: 1.02 (ref 1–1.03)
SQUAMOUS #/AREA URNS AUTO: 7 /HPF
TROPONIN I SERPL DL<=0.01 NG/ML-MCNC: <0.006 NG/ML (ref 0–0.03)
TSH SERPL DL<=0.005 MIU/L-ACNC: 0.86 UIU/ML (ref 0.4–4)
URN SPEC COLLECT METH UR: ABNORMAL
WBC # BLD AUTO: 5.36 K/UL (ref 3.9–12.7)
WBC #/AREA URNS AUTO: 19 /HPF (ref 0–5)

## 2024-05-28 PROCEDURE — 63600175 PHARM REV CODE 636 W HCPCS

## 2024-05-28 PROCEDURE — 63600175 PHARM REV CODE 636 W HCPCS: Performed by: EMERGENCY MEDICINE

## 2024-05-28 PROCEDURE — 96376 TX/PRO/DX INJ SAME DRUG ADON: CPT

## 2024-05-28 PROCEDURE — 93005 ELECTROCARDIOGRAM TRACING: CPT

## 2024-05-28 PROCEDURE — 85014 HEMATOCRIT: CPT | Mod: 59

## 2024-05-28 PROCEDURE — 80047 BASIC METABLC PNL IONIZED CA: CPT | Mod: 59

## 2024-05-28 PROCEDURE — 84132 ASSAY OF SERUM POTASSIUM: CPT

## 2024-05-28 PROCEDURE — 83735 ASSAY OF MAGNESIUM: CPT | Performed by: EMERGENCY MEDICINE

## 2024-05-28 PROCEDURE — 93010 ELECTROCARDIOGRAM REPORT: CPT | Mod: ,,, | Performed by: INTERNAL MEDICINE

## 2024-05-28 PROCEDURE — 85025 COMPLETE CBC W/AUTO DIFF WBC: CPT | Performed by: EMERGENCY MEDICINE

## 2024-05-28 PROCEDURE — 96374 THER/PROPH/DIAG INJ IV PUSH: CPT

## 2024-05-28 PROCEDURE — 81025 URINE PREGNANCY TEST: CPT | Performed by: EMERGENCY MEDICINE

## 2024-05-28 PROCEDURE — 80053 COMPREHEN METABOLIC PANEL: CPT | Performed by: EMERGENCY MEDICINE

## 2024-05-28 PROCEDURE — 84443 ASSAY THYROID STIM HORMONE: CPT | Performed by: EMERGENCY MEDICINE

## 2024-05-28 PROCEDURE — 84484 ASSAY OF TROPONIN QUANT: CPT | Performed by: EMERGENCY MEDICINE

## 2024-05-28 PROCEDURE — 25000003 PHARM REV CODE 250: Performed by: EMERGENCY MEDICINE

## 2024-05-28 PROCEDURE — 99900035 HC TECH TIME PER 15 MIN (STAT)

## 2024-05-28 PROCEDURE — 87086 URINE CULTURE/COLONY COUNT: CPT | Performed by: EMERGENCY MEDICINE

## 2024-05-28 PROCEDURE — 82330 ASSAY OF CALCIUM: CPT

## 2024-05-28 PROCEDURE — 84295 ASSAY OF SERUM SODIUM: CPT

## 2024-05-28 PROCEDURE — 96361 HYDRATE IV INFUSION ADD-ON: CPT

## 2024-05-28 PROCEDURE — 99285 EMERGENCY DEPT VISIT HI MDM: CPT | Mod: 25

## 2024-05-28 PROCEDURE — 96375 TX/PRO/DX INJ NEW DRUG ADDON: CPT

## 2024-05-28 PROCEDURE — 83880 ASSAY OF NATRIURETIC PEPTIDE: CPT | Performed by: EMERGENCY MEDICINE

## 2024-05-28 PROCEDURE — 81001 URINALYSIS AUTO W/SCOPE: CPT | Performed by: EMERGENCY MEDICINE

## 2024-05-28 RX ORDER — ONDANSETRON HYDROCHLORIDE 2 MG/ML
4 INJECTION, SOLUTION INTRAVENOUS
Status: COMPLETED | OUTPATIENT
Start: 2024-05-28 | End: 2024-05-28

## 2024-05-28 RX ORDER — MORPHINE SULFATE 4 MG/ML
4 INJECTION, SOLUTION INTRAMUSCULAR; INTRAVENOUS
Status: COMPLETED | OUTPATIENT
Start: 2024-05-28 | End: 2024-05-28

## 2024-05-28 RX ORDER — HYDROCODONE BITARTRATE AND ACETAMINOPHEN 5; 325 MG/1; MG/1
1 TABLET ORAL EVERY 6 HOURS PRN
Qty: 12 TABLET | Refills: 0 | Status: SHIPPED | OUTPATIENT
Start: 2024-05-28

## 2024-05-28 RX ADMIN — ONDANSETRON 4 MG: 2 INJECTION INTRAMUSCULAR; INTRAVENOUS at 02:05

## 2024-05-28 RX ADMIN — MORPHINE SULFATE 4 MG: 4 INJECTION INTRAVENOUS at 07:05

## 2024-05-28 RX ADMIN — SODIUM CHLORIDE 1000 ML: 9 INJECTION, SOLUTION INTRAVENOUS at 02:05

## 2024-05-28 RX ADMIN — MORPHINE SULFATE 4 MG: 4 INJECTION, SOLUTION INTRAMUSCULAR; INTRAVENOUS at 02:05

## 2024-05-28 RX ADMIN — MORPHINE SULFATE 4 MG: 4 INJECTION INTRAVENOUS at 03:05

## 2024-05-28 NOTE — CONSULTS
[facility] Emergency Dept  Orthopedics  Consult Note    Patient Name: Juan Carlos Turner  MRN: [id number]  Admission Date: 5/28/2024  Hospital Length of Stay: 0 days  Attending Provider: Malina Escobar MD  Primary Care Provider: Radha Meyer PA    Patient information was obtained from patient and ER records.     Inpatient consult to Orthopedic Surgery  Consult performed by: Darell Velasquez MD  Consult ordered by: Bipin Farmer MD        Subjective:     Principal Problem:Posterior arch fracture of first cervical vertebra    Chief Complaint:   Chief Complaint   Patient presents with    Loss of Consciousness     States passed out yesterday and had abrasions to left side of face, c/o pain to posterior scalp,     Transfer     Transfer from [facility] for NSGY        HPI: Juan Carlos Turner is a 48 y.o. female with Pmhx of anemia, Jaya, DM2 presenting with neck pain after a syncopal episode and glf onto her left side. She reported feeling lightheaded prior to the fall. She reports a previous history of similar episode when her sugar is high. She reports new onset numbness limited to left foot. She has chronic paresthesia of right foot. She was transferred to [facility] main [facility] for evaluation of type one atlas fracture,  She sustained head trauma and LOC. Patient denies bowel or bladder incontinence, saddle anesthesia, or muscle weakness. Patient also denies difficulty with keys, buttons, hand writing, or small items. Walks w/out assisted devices at baseline. Doesn't take any anticoagulation at baseline. She reports eating a lifesaver that was dipped in alcohol many hours prior to her fall.     They deny IV drug use  They deny tobacco use.   They reports social alcohol use.   They deny immunosuppressant medications.  They deny chemotherapy.  They deny radiation therapy.       Past Medical History:   Diagnosis Date    Anxiety     Asthma     DDD (degenerative disc disease), cervical     Diabetes mellitus      Hypertension     JOSUE on CPAP        Past Surgical History:   Procedure Laterality Date    ARTHROSCOPY OF KNEE Right 4/15/2021    Procedure: ARTHROSCOPY, KNEE LYSIS OF ADHESIONS;  Surgeon: Che Ruby MD;  Location: [facility] OR;  Service: Orthopedics;  Laterality: Right;    CHONDROPLASTY OF KNEE Right 4/15/2021    Procedure: CHONDROPLASTY, KNEE;  Surgeon: Che Ruby MD;  Location: [facility] OR;  Service: Orthopedics;  Laterality: Right;    EPIDURAL STEROID INJECTION N/A 10/23/2018    Procedure: Injection, Steroid, Epidural LUMBAR L4/5 TESS;  Surgeon: Ed Pina MD;  Location: [facility] PAIN MGT;  Service: Pain Management;  Laterality: N/A;    HYSTERECTOMY      laparoscopic    KNEE ARTHROSCOPY W/ MENISCECTOMY Right 4/15/2021    Procedure: ARTHROSCOPY, KNEE, WITH MENISCECTOMY LATERAL, PLICA  EXCISION;  Surgeon: Che Ruby MD;  Location: [facility] OR;  Service: Orthopedics;  Laterality: Right;    LAPAROSCOPIC SLEEVE GASTRECTOMY N/A 5/13/2019    Procedure: GASTRECTOMY, SLEEVE, LAPAROSCOPIC;  Surgeon: Jie Montanez MD;  Location: [facility] OR;  Service: General;  Laterality: N/A;    right knee surgery      SYNOVECTOMY OF KNEE Right 4/15/2021    Procedure: SYNOVECTOMY, KNEE;  Surgeon: Che Ruby MD;  Location: [facility] OR;  Service: Orthopedics;  Laterality: Right;       Review of patient's allergies indicates:  No Known Allergies    No current facility-administered medications for this encounter.     Current Outpatient Medications   Medication Sig    albuterol (PROVENTIL) 2.5 mg /3 mL (0.083 %) nebulizer solution Take 2.5 mg by nebulization every 4 to 6 hours as needed for Wheezing or Shortness of Breath.    amitriptyline (ELAVIL) 50 MG tablet Take 50 mg by mouth nightly.    amlodipine (NORVASC) 10 MG tablet Take 10 mg by mouth once daily.    aspirin (ECOTRIN) 81 MG EC tablet Take 1 tablet (81 mg total) by mouth once daily.    atorvastatin (LIPITOR) 40 MG tablet Take 40 mg by mouth once daily.    ciclopirox 0.77 % Gel Apply topically  2 (two) times daily.    citalopram (CELEXA) 20 MG tablet Take 20 mg by mouth once daily.    EScitalopram oxalate (LEXAPRO) 20 MG tablet Take 20 mg by mouth.    EScitalopram oxalate (LEXAPRO) 20 MG tablet Escitalopram Oxalate 20 MG Oral Tablet QTY: 90 tablet Days: 90 Refills: 1  Written: 02/03/23 Patient Instructions: 1 tablet PO daily. Return to clinic for follow up.    fluticasone propionate (FLONASE) 50 mcg/actuation nasal spray 1 spray (50 mcg total) by Each Nostril route 2 (two) times daily as needed. (Patient taking differently: 1 spray by Each Nostril route 2 (two) times daily as needed for Rhinitis or Allergies.)    hydrOXYzine HCL (ATARAX) 25 MG tablet hydrOXYzine HCl 25 MG Oral Tablet QTY: 30 tablet Days: 30 Refills: 2  Written: 01/20/23 Patient Instructions: 25mg PO Q6-8hrs for sleep.    ketotifen (ZADITOR) 0.025 % (0.035 %) ophthalmic solution INSTILL ONE DROP IN AFFECTED EYE ONCE A DAY AS NEEDED FOR ITCHY EYES    losartan (COZAAR) 25 MG tablet Take 25 mg by mouth once daily.    meloxicam (MOBIC) 15 MG tablet Take 15 mg by mouth once daily.    metFORMIN (GLUCOPHAGE) 1000 MG tablet Take 1,000 mg by mouth daily with breakfast.    methocarbamoL (ROBAXIN) 500 MG Tab Take 500 mg by mouth 2 (two) times daily.    multivitamin capsule Take 1 capsule by mouth once daily.    omeprazole (PRILOSEC) 20 MG capsule Take 1 capsule by mouth once daily.    ondansetron (ZOFRAN-ODT) 8 MG TbDL Take 8 mg by mouth every 8 (eight) hours as needed.    ONETOUCH DELICA LANCETS 33 gauge Misc TEST ONCE D    ONETOUCH ULTRA BLUE TEST STRIP Strp TEST ONCE D BEFORE BREAKFAST    ONETOUCH ULTRAMINI kit UTD    oxyCODONE (ROXICODONE) 10 mg Tab immediate release tablet Take 1 tablet (10 mg total) by mouth every 12 (twelve) hours as needed for Pain.    pregabalin (LYRICA) 150 MG capsule Take 150 mg by mouth 2 (two) times daily.    tiZANidine (ZANAFLEX) 4 MG tablet Take 4 mg by mouth 2 (two) times daily.    tobramycin sulfate 0.3% (TOBREX) 0.3  "% ophthalmic solution 1-2 drops topically twice daily to affected toe(s).    VENTOLIN HFA 90 mcg/actuation inhaler Inhale 1-2 puffs into the lungs every 4 to 6 hours as needed for Wheezing or Shortness of Breath.     Family History       Problem Relation (Age of Onset)    Diabetes Father          Tobacco Use    Smoking status: Never    Smokeless tobacco: Never   Substance and Sexual Activity    Alcohol use: Yes     Comment: social    Drug use: No    Sexual activity: Not on file     Review of Systems   Constitutional: Negative for chills and fever.   Cardiovascular:  Negative for chest pain.   Respiratory:  Negative for cough, sleep disturbances due to breathing and wheezing.    Musculoskeletal:  Positive for back pain.   Neurological:  Positive for dizziness, light-headedness, numbness, paresthesias and sensory change.     Objective:     Vital Signs (Most Recent):  Temp: 98 °F (36.7 °C) (05/28/24 0328)  Pulse: (!) 50 (05/28/24 0433)  Resp: 20 (05/28/24 0338)  BP: (!) 155/96 (05/28/24 0433)  SpO2: 100 % (05/28/24 0433) Vital Signs (24h Range):  Temp:  [97.8 °F (36.6 °C)-98.1 °F (36.7 °C)] 98 °F (36.7 °C)  Pulse:  [50-70] 50  Resp:  [12-25] 20  SpO2:  [99 %-100 %] 100 %  BP: (136-176)/() 155/96     Weight: 94.8 kg (209 lb)  Height: 5' 6" (167.6 cm)  Body mass index is 33.73 kg/m².    No intake or output data in the 24 hours ending 05/28/24 0436     Ortho/SPM Exam    MSK:  Right Upper Extremity  Inspection  - Skin intact throughout, no open wounds  - No swelling  - No ecchymosis, erythema, or signs of cellulitis  Palpation  - TTP over the scapula  Range of motion  - AROM and PROM of the shoulder, elbow, wrist, and hand intact  Stability  - No evidence of joint dislocation or abnormal laxity   Neurovascular  - AIN/PIN/Radial/Median/Ulnar Nerves assessed in isolation without deficit  - Able to give thumbs up, make "OK" sign, cross IF/LF, abduct/adduct fingers, make fist  - SILT throughout  - Compartments soft  - " "Radial artery palpated  - Muscle tone normal    Left Upper Extremity  Inspection  - Skin intact throughout, no open wounds  - No swelling  - No ecchymosis, erythema, or signs of cellulitis  Palpation  - NonTTP throughout, no palpable abnormality   Range of motion  - AROM and PROM of the shoulder, elbow, wrist, and hand intact  Stability  - No evidence of joint dislocation or abnormal laxity  Neurovascular  - AIN/PIN/Radial/Median/Ulnar Nerves assessed in isolation without deficit  - Able to give thumbs up, make "OK" sign, cross IF/LF, abduct/adduct fingers, make fist  - SILT throughout  - Compartments soft  - Radial artery palpated  - Muscle tone normal      Right Lower Extremity  Inspection  - Skin intact throughout, no open wounds  - No swelling  - No ecchymosis, erythema, or signs of cellulitis  Palpation  - NonTTP throughout, no palpable abnormality   Range of motion  - AROM and PROM of the hip, knee, ankle, and foot intact  Stability  - No evidence of joint dislocation or abnormal laxity,   Neurovascular  - TA/EHL/Gastroc/FHL assessed in isolation without deficit  - Compartments soft  - DP palpated   - Negative Log roll  - Negative Stinchfield  - Muscle tone normal    Left Lower Extremity  Inspection  - Skin intact throughout, no open wounds  - No swelling  - No ecchymosis, erythema, or signs of cellulitis  Palpation  - NonTTP throughout, no palpable abnormality   Range of motion  - AROM and PROM of the hip, knee, ankle, and foot intact  Stability  - No evidence of joint dislocation or abnormal laxity,   Neurovascular  - TA/EHL/Gastroc/FHL assessed in isolation without deficit  - Compartments soft  - DP palpated   - Negative Log roll  - Negative Stinchfield  - Muscle tone normal     Awake/alert/oriented x3, No acute distress, Afebrile, Vital signs stable  Normocephalic, Atraumatic  Good inspiratory effort with unlaboured breathing    Motor            RIGHT  LEFT  Deltoid(C5)        5/5    5/5  Biceps(C5)        "  5/5    5/5  Brachioradialis(C6)       5/5    5/5   Extensor Carpi Radialis Longus(C6)    5/5    5/5   Triceps(C7)       5/5    5/5     Flexor Carpi Radialis(C7)     5/5    5/5  Flexor Digitorum Superficialis(C8)    5/5    5/5  Interossei(T1)       5/5    5/5     Hip Flexion (L3)                                     5/5                  5/5  Knee Extension (L4)                              5/5                  5/5  Tib Ant (L5)                                            5/5                  5/5       EHL (L5)                                                 5/5                  5/5  Gastrocs (S1)                                         5/5                  5/5  Peroneals (S1)                                       5/5                  5/5       FHL (S2)                                                5/5                  5/5    Sensation   All dermatomes (C1-S5) normal except for left L5 & S1    Reflexes       RIGHT  LEFT  Hoffmans's        Neg    Neg  Babinski      Neg    Neg   Clonus       Neg    Neg     Pulses       RIGHT  LEFT  Radial        2+     2+  Dorsalis Pedis       1+     1+  Post Tib       1+     1+      Significant Labs: All pertinent labs within the past 24 hours have been reviewed.    Significant Imaging: I have reviewed and interpreted all pertinent imaging results/findings.  Assessment/Plan:     * Posterior arch fracture of first cervical vertebra  Juan Carlos Turner is a 48 y.o. female with a history of PVD, DM2, anemia who presented with left type 1 posterior cervical arch fracture after a glf. She endorsed acute point tenderness to her cervical spine and chronic point tenderness to her lumbar spine. She also endorsed new onset left foot paresthesia, otherwise she is neuro intact. Imaging was negative for involvement of the C1 mass or vertebral artery. Pt was placed in a hard collar.    - WBAT in a hard collar. Keep hard collar until clinic follow up.  - DVT Prophylaxis: SCDs at all times while in  bed  - Pain control: multimodal pain management  - Will f/u in clinic. Pt will be messaged for f/u          Thank you for your consult.     Darell Velasquez MD  Orthopedics  [facility]

## 2024-05-28 NOTE — ASSESSMENT & PLAN NOTE
Juan Carlos Turner is a 48 y.o. female with a history of PVD, DM2, anemia who presented with left type 1 posterior cervical arch fracture after a glf. She endorsed acute point tenderness to her cervical spine and chronic point tenderness to her lumbar spine. She also endorsed new onset left foot paresthesia, otherwise she is neuro intact. Imaging was negative for involvement of the C1 mass or vertebral artery. Pt was placed in a hard collar.    - WBAT in a hard collar. Keep hard collar until clinic follow up.  - DVT Prophylaxis: SCDs at all times while in bed  - Pain control: multimodal pain management  - Will f/u in clinic. Pt will be messaged for f/u

## 2024-05-28 NOTE — ED NOTES
Nurses Note -- 4 Eyes  5/28/2024   0400 AM    Skin assessed during: Admit    [] No Altered Skin Integrity Present    []Prevention Measures Documented    [x] Yes- Altered Skin Integrity Present or Discovered   [x] LDA Added if Not in Epic (Describe Wound)   [x] New Altered Skin Integrity was Present on Admit and Documented in LDA   [x] Wound Image Taken    Wound Care Consulted? No    Attending Nurse: SHAINA Valdez   Second RN/Staff Member:  SHAINA Marinelli

## 2024-05-28 NOTE — SUBJECTIVE & OBJECTIVE
Past Medical History:   Diagnosis Date    Anxiety     Asthma     DDD (degenerative disc disease), cervical     Diabetes mellitus     Hypertension     JOSUE on CPAP        Past Surgical History:   Procedure Laterality Date    ARTHROSCOPY OF KNEE Right 4/15/2021    Procedure: ARTHROSCOPY, KNEE LYSIS OF ADHESIONS;  Surgeon: Ceh Ruby MD;  Location: [facility] OR;  Service: Orthopedics;  Laterality: Right;    CHONDROPLASTY OF KNEE Right 4/15/2021    Procedure: CHONDROPLASTY, KNEE;  Surgeon: Che Ruby MD;  Location: [facility] OR;  Service: Orthopedics;  Laterality: Right;    EPIDURAL STEROID INJECTION N/A 10/23/2018    Procedure: Injection, Steroid, Epidural LUMBAR L4/5 TESS;  Surgeon: Ed Pina MD;  Location: [facility] PAIN MGT;  Service: Pain Management;  Laterality: N/A;    HYSTERECTOMY      laparoscopic    KNEE ARTHROSCOPY W/ MENISCECTOMY Right 4/15/2021    Procedure: ARTHROSCOPY, KNEE, WITH MENISCECTOMY LATERAL, PLICA  EXCISION;  Surgeon: Che Ruby MD;  Location: [facility] OR;  Service: Orthopedics;  Laterality: Right;    LAPAROSCOPIC SLEEVE GASTRECTOMY N/A 5/13/2019    Procedure: GASTRECTOMY, SLEEVE, LAPAROSCOPIC;  Surgeon: Jie Montanez MD;  Location: [facility] OR;  Service: General;  Laterality: N/A;    right knee surgery      SYNOVECTOMY OF KNEE Right 4/15/2021    Procedure: SYNOVECTOMY, KNEE;  Surgeon: Che Ruby MD;  Location: [facility] OR;  Service: Orthopedics;  Laterality: Right;       Review of patient's allergies indicates:  No Known Allergies    No current facility-administered medications for this encounter.     Current Outpatient Medications   Medication Sig    albuterol (PROVENTIL) 2.5 mg /3 mL (0.083 %) nebulizer solution Take 2.5 mg by nebulization every 4 to 6 hours as needed for Wheezing or Shortness of Breath.    amitriptyline (ELAVIL) 50 MG tablet Take 50 mg by mouth nightly.    amlodipine (NORVASC) 10 MG tablet Take 10 mg by mouth once daily.    aspirin (ECOTRIN) 81 MG EC tablet Take 1 tablet (81 mg total)  by mouth once daily.    atorvastatin (LIPITOR) 40 MG tablet Take 40 mg by mouth once daily.    ciclopirox 0.77 % Gel Apply topically 2 (two) times daily.    citalopram (CELEXA) 20 MG tablet Take 20 mg by mouth once daily.    EScitalopram oxalate (LEXAPRO) 20 MG tablet Take 20 mg by mouth.    EScitalopram oxalate (LEXAPRO) 20 MG tablet Escitalopram Oxalate 20 MG Oral Tablet QTY: 90 tablet Days: 90 Refills: 1  Written: 02/03/23 Patient Instructions: 1 tablet PO daily. Return to clinic for follow up.    fluticasone propionate (FLONASE) 50 mcg/actuation nasal spray 1 spray (50 mcg total) by Each Nostril route 2 (two) times daily as needed. (Patient taking differently: 1 spray by Each Nostril route 2 (two) times daily as needed for Rhinitis or Allergies.)    hydrOXYzine HCL (ATARAX) 25 MG tablet hydrOXYzine HCl 25 MG Oral Tablet QTY: 30 tablet Days: 30 Refills: 2  Written: 01/20/23 Patient Instructions: 25mg PO Q6-8hrs for sleep.    ketotifen (ZADITOR) 0.025 % (0.035 %) ophthalmic solution INSTILL ONE DROP IN AFFECTED EYE ONCE A DAY AS NEEDED FOR ITCHY EYES    losartan (COZAAR) 25 MG tablet Take 25 mg by mouth once daily.    meloxicam (MOBIC) 15 MG tablet Take 15 mg by mouth once daily.    metFORMIN (GLUCOPHAGE) 1000 MG tablet Take 1,000 mg by mouth daily with breakfast.    methocarbamoL (ROBAXIN) 500 MG Tab Take 500 mg by mouth 2 (two) times daily.    multivitamin capsule Take 1 capsule by mouth once daily.    omeprazole (PRILOSEC) 20 MG capsule Take 1 capsule by mouth once daily.    ondansetron (ZOFRAN-ODT) 8 MG TbDL Take 8 mg by mouth every 8 (eight) hours as needed.    ONETOUCH DELICA LANCETS 33 gauge Misc TEST ONCE D    ONETOUCH ULTRA BLUE TEST STRIP Strp TEST ONCE D BEFORE BREAKFAST    ONETOUCH ULTRAMINI kit UTD    oxyCODONE (ROXICODONE) 10 mg Tab immediate release tablet Take 1 tablet (10 mg total) by mouth every 12 (twelve) hours as needed for Pain.    pregabalin (LYRICA) 150 MG capsule Take 150 mg by mouth 2  "(two) times daily.    tiZANidine (ZANAFLEX) 4 MG tablet Take 4 mg by mouth 2 (two) times daily.    tobramycin sulfate 0.3% (TOBREX) 0.3 % ophthalmic solution 1-2 drops topically twice daily to affected toe(s).    VENTOLIN HFA 90 mcg/actuation inhaler Inhale 1-2 puffs into the lungs every 4 to 6 hours as needed for Wheezing or Shortness of Breath.     Family History       Problem Relation (Age of Onset)    Diabetes Father          Tobacco Use    Smoking status: Never    Smokeless tobacco: Never   Substance and Sexual Activity    Alcohol use: Yes     Comment: social    Drug use: No    Sexual activity: Not on file     Review of Systems   Constitutional: Negative for chills and fever.   Cardiovascular:  Negative for chest pain.   Respiratory:  Negative for cough, sleep disturbances due to breathing and wheezing.    Musculoskeletal:  Positive for back pain.   Neurological:  Positive for dizziness, light-headedness, numbness, paresthesias and sensory change.     Objective:     Vital Signs (Most Recent):  Temp: 98 °F (36.7 °C) (05/28/24 0328)  Pulse: (!) 50 (05/28/24 0433)  Resp: 20 (05/28/24 0338)  BP: (!) 155/96 (05/28/24 0433)  SpO2: 100 % (05/28/24 0433) Vital Signs (24h Range):  Temp:  [97.8 °F (36.6 °C)-98.1 °F (36.7 °C)] 98 °F (36.7 °C)  Pulse:  [50-70] 50  Resp:  [12-25] 20  SpO2:  [99 %-100 %] 100 %  BP: (136-176)/() 155/96     Weight: 94.8 kg (209 lb)  Height: 5' 6" (167.6 cm)  Body mass index is 33.73 kg/m².    No intake or output data in the 24 hours ending 05/28/24 0436     Ortho/SPM Exam    MSK:  Right Upper Extremity  Inspection  - Skin intact throughout, no open wounds  - No swelling  - No ecchymosis, erythema, or signs of cellulitis  Palpation  - TTP over the scapula  Range of motion  - AROM and PROM of the shoulder, elbow, wrist, and hand intact  Stability  - No evidence of joint dislocation or abnormal laxity   Neurovascular  - AIN/PIN/Radial/Median/Ulnar Nerves assessed in isolation without " "deficit  - Able to give thumbs up, make "OK" sign, cross IF/LF, abduct/adduct fingers, make fist  - SILT throughout  - Compartments soft  - Radial artery palpated  - Muscle tone normal    Left Upper Extremity  Inspection  - Skin intact throughout, no open wounds  - No swelling  - No ecchymosis, erythema, or signs of cellulitis  Palpation  - NonTTP throughout, no palpable abnormality   Range of motion  - AROM and PROM of the shoulder, elbow, wrist, and hand intact  Stability  - No evidence of joint dislocation or abnormal laxity  Neurovascular  - AIN/PIN/Radial/Median/Ulnar Nerves assessed in isolation without deficit  - Able to give thumbs up, make "OK" sign, cross IF/LF, abduct/adduct fingers, make fist  - SILT throughout  - Compartments soft  - Radial artery palpated  - Muscle tone normal      Right Lower Extremity  Inspection  - Skin intact throughout, no open wounds  - No swelling  - No ecchymosis, erythema, or signs of cellulitis  Palpation  - NonTTP throughout, no palpable abnormality   Range of motion  - AROM and PROM of the hip, knee, ankle, and foot intact  Stability  - No evidence of joint dislocation or abnormal laxity,   Neurovascular  - TA/EHL/Gastroc/FHL assessed in isolation without deficit  - Compartments soft  - DP palpated   - Negative Log roll  - Negative Stinchfield  - Muscle tone normal    Left Lower Extremity  Inspection  - Skin intact throughout, no open wounds  - No swelling  - No ecchymosis, erythema, or signs of cellulitis  Palpation  - NonTTP throughout, no palpable abnormality   Range of motion  - AROM and PROM of the hip, knee, ankle, and foot intact  Stability  - No evidence of joint dislocation or abnormal laxity,   Neurovascular  - TA/EHL/Gastroc/FHL assessed in isolation without deficit  - Compartments soft  - DP palpated   - Negative Log roll  - Negative Stinchfield  - Muscle tone normal     Awake/alert/oriented x3, No acute distress, Afebrile, Vital signs stable  Normocephalic, " Atraumatic  Good inspiratory effort with unlaboured breathing    Motor            RIGHT  LEFT  Deltoid(C5)        5/5    5/5  Biceps(C5)         5/5    5/5  Brachioradialis(C6)       5/5    5/5   Extensor Carpi Radialis Longus(C6)    5/5    5/5   Triceps(C7)       5/5    5/5     Flexor Carpi Radialis(C7)     5/5    5/5  Flexor Digitorum Superficialis(C8)    5/5    5/5  Interossei(T1)       5/5    5/5     Hip Flexion (L3)                                     5/5                  5/5  Knee Extension (L4)                              5/5                  5/5  Tib Ant (L5)                                            5/5                  5/5       EHL (L5)                                                 5/5                  5/5  Gastrocs (S1)                                         5/5                  5/5  Peroneals (S1)                                       5/5                  5/5       FHL (S2)                                                5/5                  5/5    Sensation   All dermatomes (C1-S5) normal except for left L5 & S1    Reflexes       RIGHT  LEFT  Hoffmans's        Neg    Neg  Babinski      Neg    Neg   Clonus       Neg    Neg     Pulses       RIGHT  LEFT  Radial        2+     2+  Dorsalis Pedis       1+     1+  Post Tib       1+     1+      Significant Labs: All pertinent labs within the past 24 hours have been reviewed.    Significant Imaging: I have reviewed and interpreted all pertinent imaging results/findings.

## 2024-05-28 NOTE — ED TRIAGE NOTES
Pt presents to the ED as a transfer from [facility] for NSGY. Pt reports passing out yesterday after feeling lightheaded, striking her head on the concrete. +abrasions L. Face and L. Shoulder. Pt reports pain to head, L. Shoulder, and upper back. Pt denies numbness/tingling in extremities.

## 2024-05-28 NOTE — DISCHARGE INSTRUCTIONS
You have been evaluated by Orthopedic surgery in the ED today.  Please f/u w/ them in clinic for further management your pain.    Your Care Instructions  You can have neck pain anywhere from the bottom of your head to the top of your shoulders. It can spread to the upper back or arms. Injuries, painting a ceiling, sleeping with your neck twisted, staying in one position for too long, and many other activities can cause neck pain.  Most neck pain gets better with home care. Your doctor may recommend medicine to relieve pain or relax your muscles. He or she may suggest exercise and physical therapy to increase flexibility and relieve stress. You may need to wear a special (cervical) collar to support your neck for a day or two.  Follow-up care is a key part of your treatment and safety. Be sure to make and go to all appointments, and call your doctor if you are having problems. It's also a good idea to know your test results and keep a list of the medicines you take.    How can you care for yourself at home?  Try using a heating pad on a low or medium setting for 15 to 20 minutes every 2 or 3 hours. Try a warm shower in place of one session with the heating pad.  You can also try an ice pack for 10 to 15 minutes every 2 to 3 hours. Put a thin cloth between the ice and your skin.  Take pain medicines exactly as directed.  If the doctor gave you a prescription medicine for pain, take it as prescribed.  If you are not taking a prescription pain medicine, ask your doctor if you can take an over-the-counter medicine.  If your doctor recommends a cervical collar, wear it exactly as directed.      When should you call for help?  Call your doctor or seek immediate medical care if:  You have new or worsening numbness in your arms, buttocks or legs.  You have new or worsening weakness in your arms or legs. (This could make it hard to stand up.)  You lose control of your bladder or bowels.  Watch closely for changes in your  health, and be sure to contact your doctor or nurse advice line if:  Your neck pain is getting worse.  You are not getting better after 1 week.  You do not get better as expected.

## 2024-05-28 NOTE — HPI
Juan Carlos Turner is a 48 y.o. female with Pmhx of anemia, Jaya, DM2 presenting with neck pain after a syncopal episode and glf onto her left side. She reported feeling lightheaded prior to the fall. She reports a previous history of similar episode when her sugar is high. She reports new onset numbness limited to left foot. She has chronic paresthesia of right foot. She was transferred to [facility] for evaluation of type one atlas fracture,  She sustained head trauma and LOC. Patient denies bowel or bladder incontinence, saddle anesthesia, or muscle weakness. Patient also denies difficulty with keys, buttons, hand writing, or small items. Walks w/out assisted devices at baseline. Doesn't take any anticoagulation at baseline. She reports eating a lifesaver that was dipped in alcohol many hours prior to her fall.     They deny IV drug use  They deny tobacco use.   They reports social alcohol use.   They deny immunosuppressant medications.  They deny chemotherapy.  They deny radiation therapy.

## 2024-05-28 NOTE — ED PROVIDER NOTES
Encounter Date: 5/28/2024     Source of History:   Patient, EMS, and medical record, without language barrier or      Chief complaint:  Loss of Consciousness (States passed out yesterday and had abrasions to left side of face, c/o pain to posterior scalp, ) and Transfer (Transfer from [facility] for NSGY)    HPI:  Juan Carlos Turner is a 48 y.o. female with history of DM2, HTN, DLD, asthma, presents as transfer from [facility] for ortho spine services. She suffered a syncopal episode yesterday. Patient felt fatigued all day Sunday 5/26/24. She stayed in bed all day until it was time to go to her daughter's house for her daughter's 30th birthday. She also denies illicit drug use. She was standing outside with her aunt around 11pm when she felt more lightheaded and then passed out, striking the left side of her face. She came to with family around her. Family took patient home, where she spent today ([facility] Day, Monday 5/27/24) in bed. Patient reports pain to the left side of her face, her upper back, and her left shoulder.     This is the extent to the patients complaints today here in the emergency department.      Review of patient's allergies indicates:  No Known Allergies  PMH:  As per HPI and below:  Past Medical History:   Diagnosis Date    Anxiety     Asthma     DDD (degenerative disc disease), cervical     Diabetes mellitus     Hypertension     JOSUE on CPAP      Past Surgical History:   Procedure Laterality Date    ARTHROSCOPY OF KNEE Right 4/15/2021    Procedure: ARTHROSCOPY, KNEE LYSIS OF ADHESIONS;  Surgeon: Che Ruby MD;  Location: [facility] OR;  Service: Orthopedics;  Laterality: Right;    CHONDROPLASTY OF KNEE Right 4/15/2021    Procedure: CHONDROPLASTY, KNEE;  Surgeon: Che Ruby MD;  Location: [facility] OR;  Service: Orthopedics;  Laterality: Right;    EPIDURAL STEROID INJECTION N/A 10/23/2018    Procedure: Injection, Steroid, Epidural LUMBAR L4/5 TESS;  Surgeon: Ed Pina MD;   "Location: [facility] PAIN MGT;  Service: Pain Management;  Laterality: N/A;    HYSTERECTOMY      laparoscopic    KNEE ARTHROSCOPY W/ MENISCECTOMY Right 4/15/2021    Procedure: ARTHROSCOPY, KNEE, WITH MENISCECTOMY LATERAL, PLICA  EXCISION;  Surgeon: Che Ruby MD;  Location: [facility] OR;  Service: Orthopedics;  Laterality: Right;    LAPAROSCOPIC SLEEVE GASTRECTOMY N/A 5/13/2019    Procedure: GASTRECTOMY, SLEEVE, LAPAROSCOPIC;  Surgeon: Jie Montanez MD;  Location: [facility] OR;  Service: General;  Laterality: N/A;    right knee surgery      SYNOVECTOMY OF KNEE Right 4/15/2021    Procedure: SYNOVECTOMY, KNEE;  Surgeon: Che Ruby MD;  Location: [facility] OR;  Service: Orthopedics;  Laterality: Right;     Social History     Socioeconomic History    Marital status: Single   Tobacco Use    Smoking status: Never    Smokeless tobacco: Never   Substance and Sexual Activity    Alcohol use: Yes     Comment: social    Drug use: No     Vitals:    BP (!) 155/96   Pulse (!) 50   Temp 98 °F (36.7 °C)   Resp 20   Ht 5' 6" (1.676 m)   Wt 94.8 kg (209 lb)   SpO2 100%   Breastfeeding No   BMI 33.73 kg/m²     Physical Exam  Vitals and nursing note reviewed.   Constitutional:       General: She is not in acute distress.     Appearance: Normal appearance. She is not toxic-appearing or diaphoretic.   HENT:      Head: Normocephalic and atraumatic.      Comments:  Left-sided facial contusions and abrasions     Right Ear: External ear normal.      Left Ear: External ear normal.   Eyes:      General: No scleral icterus.     Conjunctiva/sclera: Conjunctivae normal.   Cardiovascular:      Rate and Rhythm: Normal rate and regular rhythm.   Pulmonary:      Effort: Pulmonary effort is normal. No respiratory distress.      Breath sounds: No stridor.   Abdominal:      General: Abdomen is flat. There is no distension.   Musculoskeletal:         General: No swelling.      Cervical back: Normal range of motion and neck supple.      Right lower leg: No " edema.   Skin:     General: Skin is dry.      Coloration: Skin is not jaundiced.   Neurological:      Mental Status: She is alert and oriented to person, place, and time.      Comments:   -Moves all extremities and carries on conversation  -II: PERRL; III/IV/VI: EOMI w/out evidence of nystagmus or pain;  -V: no deficits appreciated to light touch bilateral face;   -VII: no facial weakness, no facial asymmetry. Eyebrow raise symmetric. Smile symmetric;   -IX/X: palate midline, and raises symmetrically; XI: shoulder shrug 5/5 bilaterally; XII: tongue is midline w/out asymmetry.   -Strength 5/5 to right upper and lower extremities,  -Strength 5/5 to left upper and lower extremities,  -Sensation intact to light touch to bilateral upper and lower extremities       Procedures    Laboratory Studies:  Labs that have been ordered have been independently reviewed and interpreted by myself.  Labs Reviewed   COMPREHENSIVE METABOLIC PANEL - Abnormal; Notable for the following components:       Result Value    Glucose 117 (*)     All other components within normal limits   CBC W/ AUTO DIFFERENTIAL - Abnormal; Notable for the following components:    Hemoglobin 11.4 (*)     MCH 26.5 (*)     MCHC 30.6 (*)     All other components within normal limits   URINALYSIS, REFLEX TO URINE CULTURE - Abnormal; Notable for the following components:    Leukocytes, UA 1+ (*)     All other components within normal limits    Narrative:     Specimen Source->Urine   URINALYSIS MICROSCOPIC - Abnormal; Notable for the following components:    WBC, UA 19 (*)     All other components within normal limits    Narrative:     Specimen Source->Urine   ISTAT PROCEDURE - Abnormal; Notable for the following components:    POC Hematocrit 33 (*)     All other components within normal limits   CULTURE, URINE   B-TYPE NATRIURETIC PEPTIDE   MAGNESIUM   TROPONIN I   TSH   POCT URINE PREGNANCY     Imaging Results              X-ray Thoracolumbar Spine Lateral Supine  and Lateral Upright (In process)                      X-Ray Scapula Left (In process)                      X-Ray Cervical Spine AP And Lateral (Final result)  Result time 05/28/24 04:50:46      Final result by Hernandez Laboy MD (05/28/24 04:50:46)                   Impression:      See findings above.      Electronically signed by: Hernandez Laboy MD  Date:    05/28/2024  Time:    04:50               Narrative:    EXAMINATION:  XR CERVICAL SPINE AP LATERAL    CLINICAL HISTORY:  C1 fracture;    TECHNIQUE:  AP, lateral and open mouth views of the cervical spine were performed.    COMPARISON:  CT, 05/28/2024.    FINDINGS:  Exam quality limited by suboptimal positioning and overlapping hair artifact.  Known C1 fracture not well evaluated.  Remainder of the cervical spine is similar to recent prior CT.  Grossly normal sagittal alignment.  Straightening of the normal cervical lordosis.                                       CT Lumbar Spine Without Contrast (Final result)  Result time 05/28/24 02:57:44      Final result by Isra Peterson MD (05/28/24 02:57:44)                   Impression:      Chronic changes are noted, correlation for any specific level of symptomatology is needed.    There is no evidence for acute lumbar spine fracture.      Electronically signed by: Isra Peterson  Date:    05/28/2024  Time:    02:57               Narrative:    EXAMINATION:  CT LUMBAR SPINE WITHOUT CONTRAST    CLINICAL HISTORY:  Back trauma, no prior imaging (Age >= 16y);    TECHNIQUE:  Low-dose axial, sagittal and coronal reformations are obtained through the lumbar spine.  Contrast was not administered.    COMPARISON:  CT examination of the lumbar spine July 10, 2020    FINDINGS:  Vertebral body height and alignment appears appropriate, there is no high-grade spondylolisthesis, there is no evidence for high-grade or acute compression fracture deformity.  Facet arthropathy is noted, there is no evidence for facet dislocation  or facet fracture deformity.  There is vacuum facet noted on the right at L3-4.    There is mild degenerative disc bulge at L3-4, there is no high-grade spinal canal stenosis, there is encroachment into the neural foramen at the level of the disc plane without high-grade stenosis.    The L4-5 level demonstrates diffuse degenerative disc bulge with anterior impression upon the dural sac.  There is no spinal canal stenosis.  There is encroachment into the neural foramen at the level of the disc plane, more prominent on the right, correlation for exiting nerve root symptomatology on the right greater than the left is needed.    The L5-S1 level demonstrates mild degenerative disc bulge, there is no high-grade spinal canal or foraminal stenosis.    There are mild chronic changes at the sacroiliac joints.    On close evaluation of available imaging, there is no evidence for acute fracture deformity of the lumbar spine.                                       CT Thoracic Spine Without Contrast (Final result)  Result time 05/28/24 02:57:54      Final result by Isra Peterson MD (05/28/24 02:57:54)                   Impression:      There is no evidence for acute thoracic spine fracture.      Electronically signed by: Isra Peterson  Date:    05/28/2024  Time:    02:57               Narrative:    EXAMINATION:  CT THORACIC SPINE WITHOUT CONTRAST    CLINICAL HISTORY:  Spine fracture, thoracic, traumatic;    TECHNIQUE:  CT examination of the thoracic spine was performed.  Axial imaging, sagittal and coronal reconstruction imaging is submitted.    COMPARISON:  None    FINDINGS:  Vertebral body height and alignment appear appropriate, there is no evidence for high-grade spondylolisthesis, there is no evidence for high-grade or acute compression fracture deformity.  There is no evidence for facet dislocation, there is no facet fracture deformity.    There is no high-grade spinal canal stenosis and no evidence for large focal  disc protrusion.    The osseous structures appear intact, there is no evidence for acute fracture deformity of the thoracic spine.    Limited imaging of the lungs demonstrates motion artifact, and mild atelectatic change.                                        CT Cervical Spine Without Contrast (Final result)  Result time 05/28/24 01:49:03      Final result by Isra Peterson MD (05/28/24 01:49:03)                   Impression:      Acute fracture involving the posterior arch of C1 on the left, as discussed above.    The remainder of the osseous structures appear intact without additional evidence for acute fracture deformity.    Mild chronic changes without high-grade spinal canal stenosis.    The findings were discussed with Dr. Ramos in the ER at the time of dictation.    This report was flagged in Epic as abnormal.      Electronically signed by: Isra Peterson  Date:    05/28/2024  Time:    01:49               Narrative:    EXAMINATION:  CT CERVICAL SPINE WITHOUT CONTRAST    CLINICAL HISTORY:  Neck trauma, dangerous injury mechanism (Age 16-64y);    TECHNIQUE:  Low dose axial images, sagittal and coronal reformations were performed though the cervical spine.  Contrast was not administered.    COMPARISON:  None    FINDINGS:  There is straightening of the cervical spine.  There is no evidence for high-grade spondylolisthesis, there is no evidence for high-grade or acute compression fracture deformity.  There is no evidence for facet dislocation or facet fracture deformity.  The occipital condyles articulate appropriately with the superior articular facets of C1 at the craniocervical junction.    There is acute fracture deformity involving the posterior arch of C1 on the left, as seen on series 3 axial image 95.  The remainder of the visualized osseous structures appear intact without additional evidence for acute fracture deformity.    There is mild degenerative disc disease at C2-3, without high-grade  spinal canal stenosis.  There is mild degenerative disc bulge at C3-4, mild anterior impression upon the dural sac, no high-grade spinal canal stenosis.  Mild chronic changes of the cervical spine otherwise noted, there is no high-grade spinal canal or foraminal stenosis and no evidence for large focal disc protrusion.                                        CT Maxillofacial Without Contrast (Final result)  Result time 05/28/24 01:49:31      Final result by Isra Peterson MD (05/28/24 01:49:31)                   Impression:      There is no evidence for acute facial or orbital fracture.    Left lateral orbital extracranial soft tissue injury noted.    Fracture of the posterior arch of C1 on the left as discussed on the cervical spine CT report.    This report was flagged in Epic as abnormal.      Electronically signed by: Isra Peterson  Date:    05/28/2024  Time:    01:49               Narrative:    EXAMINATION:  CT MAXILLOFACIAL WITHOUT CONTRAST    CLINICAL HISTORY:  Facial trauma, blunt;    TECHNIQUE:  Low dose axial images, sagittal and coronal reformations were obtained through the face.  Contrast was not administered.    COMPARISON:  None    FINDINGS:  There is extracranial soft tissue injury overlying the left lateral orbital location, without evidence for underlying fracture.  The globes, extraocular muscles and optic nerves of the orbits appear intact, there is no evidence for retrobulbar hematoma.    The paranasal sinuses appear well aerated with minimal mucosal thickening.  The visualized mastoid air cells appear appropriate.  The middle ear cavity bilaterally appears appropriate.    The temporomandibular joints appear intact.  Dental changes are noted, correlation for acute versus chronic dental disease is needed.    There is acute fracture involving the posterior arch of C1 on the left as discussed on the cervical spine CT report.  The visualized facial and orbital bones appear intact without  evidence for acute fracture.                                       CT Head Without Contrast (Final result)  Result time 05/28/24 01:49:14      Final result by Isra Peterson MD (05/28/24 01:49:14)                   Impression:      There is no evidence for acute intracranial process.    Extracranial soft tissue injury is noted, there is no evidence for underlying calvarial fracture.    Small metallic density within the right frontal extracranial soft tissues noted for which clinical correlation is needed.    Findings referable to the face, orbits and paranasal sinuses are dictated separately.    Findings referable to the cervical spine dictated separately.      Electronically signed by: Isra Peterson  Date:    05/28/2024  Time:    01:49               Narrative:    EXAMINATION:  CT HEAD WITHOUT CONTRAST    CLINICAL HISTORY:  Facial trauma, blunt;    TECHNIQUE:  Low dose axial images were obtained through the head.  Coronal and sagittal reformations were also performed. Contrast was not administered.    COMPARISON:  None.    FINDINGS:  The ventricular system, sulcal pattern and parenchymal attenuation characteristics appear appropriate for age, there is no evidence for intracranial mass, mass effect or midline shift, there is no evidence for acute intracranial hemorrhage.  Appropriate CSF spaces are seen at the skull base.    There is a metallic density at the right frontal extracranial location, this may relate to prior injury, clinical correlation is needed.  There is extracranial soft tissue injury overlying the left lateral orbital temporal region, there is no evidence for underlying fracture.  Findings referable to the face, orbits and paranasal sinuses are dictated separately.  The mastoid air cells appear well aerated.  The calvarium appears intact.  Findings referable to the cervical spine are dictated separately.                                       X-Ray Chest AP Portable (Final result)  Result time  05/28/24 01:04:03      Final result by Isra Peterson MD (05/28/24 01:04:03)                   Impression:      There is no radiographic evidence for acute intrathoracic process.      Electronically signed by: Isra Peterson  Date:    05/28/2024  Time:    01:04               Narrative:    EXAMINATION:  XR CHEST AP PORTABLE    CLINICAL HISTORY:  syncope;    TECHNIQUE:  Single frontal view of the chest was performed.    COMPARISON:  Chest radiograph March 24, 2020    FINDINGS:  Single portable chest view is submitted.  There is mild accentuated attenuation consistent with mild soft tissue attenuation associated with body habitus.  The cardiomediastinal silhouette appears appropriate and stable.    Mild atelectatic change noted.  There is no evidence for confluent infiltrate or consolidation, significant pleural effusion or pneumothorax.    The visualized osseous structures appear intact.                                    I decided to obtain the patient's medical records.  Summary of Medical Records:    Medications   morphine injection 4 mg (4 mg Intravenous Given 5/28/24 0207)   ondansetron injection 4 mg (4 mg Intravenous Given 5/28/24 0207)   sodium chloride 0.9% bolus 1,000 mL 1,000 mL (0 mLs Intravenous Stopped 5/28/24 0329)   morphine injection 4 mg (4 mg Intravenous Given 5/28/24 0338)     MDM:    48 y.o. female with  C1 posterior arch fracture      ED Management:  Orthopedics spine consulted upon arrival.  Patient given morphine for pain control.  Orthopedics recommended plain films and C-spine MRI.  Patient signed out to oncoming team pending C-spine MRI and final orthopedic recommendations    Discussed with:   Orthopedics regarding management    Medical Decision Making  Amount and/or Complexity of Data Reviewed  Labs: ordered.  Radiology: ordered.    Risk  Prescription drug management.            Diagnostic Impression:    Final diagnoses:  [R55] Syncope  [S12.031A] Closed nondisplaced fracture of  posterior arch of first cervical vertebra, initial encounter (Primary)  [R55] Syncope and collapse     ED Disposition Condition    Transfer to Another Facility Stable                 Attending Attestation:   Physician Attestation Statement for Resident:  As the supervising MD   Physician Attestation Statement: I have personally seen and examined this patient.   I agree with the above history.  -:   As the supervising MD I agree with the above PE.     As the supervising MD I agree with the above treatment, course, plan, and disposition.     I have reviewed the following: records from a referring facility.                 Bipin Farmer MD  Resident  05/28/24 0547       Malina Escobar MD  05/28/24 0599

## 2024-05-28 NOTE — CARE UPDATE
After further discussion with staff, patient does not appear to have an acute posterior arch fracture of C1. Findings on CT likely represent a posterior lateral non-fusion of the left C1 arch. This is a chronic finding. Patient ok for discharge from orthopedic perspective. No need for C collar at this time. She may follow up outpatient in clinic.

## 2024-05-28 NOTE — PROVIDER PROGRESS NOTES - EMERGENCY DEPT.
Encounter Date: 5/28/2024    ED Physician Progress Notes        ED Physician Hand-off Note:    ED Course: I assumed care of patient from off-going ED physician team. Briefly, Patient is a 47 yo F who presented as a transfer here for further evaluation of concern for C1 fracture seen on CT at an outside hospital.    At the time of signout plan was pending -MRI C spine and ortho recommendations.    Medications given in the ED:    Medications   morphine injection 4 mg (4 mg Intravenous Given 5/28/24 0207)   ondansetron injection 4 mg (4 mg Intravenous Given 5/28/24 0207)   sodium chloride 0.9% bolus 1,000 mL 1,000 mL (0 mLs Intravenous Stopped 5/28/24 0329)   morphine injection 4 mg (4 mg Intravenous Given 5/28/24 0338)   morphine injection 4 mg (4 mg Intravenous Given 5/28/24 0749)     9:29 AM   I spoke with orthopedics, and they do not think this represents an acute fracture but rather congenital defect   They recommend no collar at discharge and follow up in their clinic  Patient in agreement with plan, short course pain medication provided    Disposition: discharge home    Patient comfortable with plan for discharge. Patient counseled regarding exam, results, diagnosis, treatment, and plan.    Impression: Final diagnoses:  [R55] Syncope  [R55] Syncope and collapse  [M54.2] Neck pain (Primary)

## 2024-05-28 NOTE — ED PROVIDER NOTES
Encounter Date: 5/28/2024       History     Chief Complaint   Patient presents with    Loss of Consciousness     States passed out yesterday and had abrasions to left side of face, c/o pain to posterior scalp,      48-year-old female with DM2, HTN, DLD, asthma, presents via personal transportation to [facility] s/p syncopal episode yesterday.  Patient felt fatigued all day Sunday 5/26/24.  She stayed in bed all day until it was time to go to her daughter's house for her daughter's 30th birthday.  Patient had a tiny piece of a LifeSaver that had been soaking in EtOH but otherwise denies EtOH consumption.  She also denies illicit drug use.  She was standing outside with her aunt around 11pm when she felt more lightheaded and then passed out.  She states she was on the street, around some parked cars.  She evidently struck the left side of her face when she passed out.  She came to with family around her.  Family took patient home, where she spent today ([facility] Day, Monday 5/27/24) in bed.  Patient reports pain to the left side of her face, her upper back, and her left shoulder.    Last Tdap 1/15/2022.        Review of patient's allergies indicates:  No Known Allergies  Past Medical History:   Diagnosis Date    Anxiety     Asthma     DDD (degenerative disc disease), cervical     Diabetes mellitus     Hypertension     JSOUE on CPAP      Past Surgical History:   Procedure Laterality Date    ARTHROSCOPY OF KNEE Right 4/15/2021    Procedure: ARTHROSCOPY, KNEE LYSIS OF ADHESIONS;  Surgeon: Che Ruby MD;  Location: [facility] OR;  Service: Orthopedics;  Laterality: Right;    CHONDROPLASTY OF KNEE Right 4/15/2021    Procedure: CHONDROPLASTY, KNEE;  Surgeon: Che Ruby MD;  Location: [facility] OR;  Service: Orthopedics;  Laterality: Right;    EPIDURAL STEROID INJECTION N/A 10/23/2018    Procedure: Injection, Steroid, Epidural LUMBAR L4/5 TESS;  Surgeon: Ed Pina MD;  Location: [facility] PAIN [facility]GT;  Service: Pain Management;   Laterality: N/A;    HYSTERECTOMY      laparoscopic    KNEE ARTHROSCOPY W/ MENISCECTOMY Right 4/15/2021    Procedure: ARTHROSCOPY, KNEE, WITH MENISCECTOMY LATERAL, PLICA  EXCISION;  Surgeon: Che Ruby MD;  Location: [facility] OR;  Service: Orthopedics;  Laterality: Right;    LAPAROSCOPIC SLEEVE GASTRECTOMY N/A 5/13/2019    Procedure: GASTRECTOMY, SLEEVE, LAPAROSCOPIC;  Surgeon: Jie Montanez MD;  Location: [facility] OR;  Service: General;  Laterality: N/A;    right knee surgery      SYNOVECTOMY OF KNEE Right 4/15/2021    Procedure: SYNOVECTOMY, KNEE;  Surgeon: Che Ruby MD;  Location: [facility] OR;  Service: Orthopedics;  Laterality: Right;     Family History   Problem Relation Name Age of Onset    Diabetes Father       Social History     Tobacco Use    Smoking status: Never    Smokeless tobacco: Never   Substance Use Topics    Alcohol use: Yes     Comment: social    Drug use: No     Review of Systems   Constitutional:  Positive for fatigue. Negative for chills and fever.   HENT:  Negative for nosebleeds.    Eyes:  Negative for visual disturbance.   Respiratory:  Negative for cough and shortness of breath.    Cardiovascular:  Negative for chest pain and palpitations.   Gastrointestinal:  Negative for abdominal pain.   Genitourinary:  Negative for flank pain.   Musculoskeletal:  Positive for back pain.   Skin:  Positive for wound (abrasions left face).   Neurological:  Positive for syncope and light-headedness.       Physical Exam     Initial Vitals [05/28/24 0034]   BP Pulse Resp Temp SpO2   136/85 70 18 98.1 °F (36.7 °C) 100 %      MAP       --         Physical Exam    Nursing note and vitals reviewed.  Constitutional: She appears well-developed and well-nourished. She is not diaphoretic.   Awake, alert, nontoxic.   HENT:   Head: Normocephalic.   Mouth/Throat: Oropharynx is clear and moist.   1cm laceration to left lateral brow exposing subcutaneous tissue. Scab present. No active bleeding. Abrasions to left lateral ear,  left forehead, left cheek.    Eyes: Conjunctivae and EOM are normal. Pupils are equal, round, and reactive to light.   Neck: Neck supple.   Mild diffuse midline TTP.  Able to range neck normally.   Normal range of motion.  Cardiovascular:  Normal rate, regular rhythm, normal heart sounds and intact distal pulses.           No murmur heard.  Pulmonary/Chest: Breath sounds normal. No respiratory distress. She has no wheezes. She has no rhonchi. She has no rales.   Abdominal: Abdomen is soft. There is no abdominal tenderness. There is no rebound.   Musculoskeletal:         General: Tenderness present. No edema. Normal range of motion.      Cervical back: Normal range of motion and neck supple.      Comments: Mid-thoracic upper back TTP     Neurological: She is alert and oriented to person, place, and time. She has normal strength. No cranial nerve deficit.   Moving all extremities.   Skin: Skin is warm and dry. No erythema. There is pallor.   Psychiatric: Her behavior is normal.         ED Course   Procedures  Labs Reviewed   COMPREHENSIVE METABOLIC PANEL - Abnormal; Notable for the following components:       Result Value    Glucose 117 (*)     All other components within normal limits   CBC W/ AUTO DIFFERENTIAL - Abnormal; Notable for the following components:    Hemoglobin 11.4 (*)     MCH 26.5 (*)     MCHC 30.6 (*)     All other components within normal limits   ISTAT PROCEDURE - Abnormal; Notable for the following components:    POC Hematocrit 33 (*)     All other components within normal limits   B-TYPE NATRIURETIC PEPTIDE   MAGNESIUM   TROPONIN I   TSH   URINALYSIS, REFLEX TO URINE CULTURE   POCT URINE PREGNANCY     EKG Readings: (Independently Interpreted)   00:42: Sinus bradycardia, HR 59. Normal axis. No ectopy. No STEMI.        Imaging Results              CT Lumbar Spine Without Contrast (Final result)  Result time 05/28/24 02:57:44      Final result by Isra Peterson MD (05/28/24 02:57:44)                    Impression:      Chronic changes are noted, correlation for any specific level of symptomatology is needed.    There is no evidence for acute lumbar spine fracture.      Electronically signed by: Isra Peterson  Date:    05/28/2024  Time:    02:57               Narrative:    EXAMINATION:  CT LUMBAR SPINE WITHOUT CONTRAST    CLINICAL HISTORY:  Back trauma, no prior imaging (Age >= 16y);    TECHNIQUE:  Low-dose axial, sagittal and coronal reformations are obtained through the lumbar spine.  Contrast was not administered.    COMPARISON:  CT examination of the lumbar spine July 10, 2020    FINDINGS:  Vertebral body height and alignment appears appropriate, there is no high-grade spondylolisthesis, there is no evidence for high-grade or acute compression fracture deformity.  Facet arthropathy is noted, there is no evidence for facet dislocation or facet fracture deformity.  There is vacuum facet noted on the right at L3-4.    There is mild degenerative disc bulge at L3-4, there is no high-grade spinal canal stenosis, there is encroachment into the neural foramen at the level of the disc plane without high-grade stenosis.    The L4-5 level demonstrates diffuse degenerative disc bulge with anterior impression upon the dural sac.  There is no spinal canal stenosis.  There is encroachment into the neural foramen at the level of the disc plane, more prominent on the right, correlation for exiting nerve root symptomatology on the right greater than the left is needed.    The L5-S1 level demonstrates mild degenerative disc bulge, there is no high-grade spinal canal or foraminal stenosis.    There are mild chronic changes at the sacroiliac joints.    On close evaluation of available imaging, there is no evidence for acute fracture deformity of the lumbar spine.                                       CT Thoracic Spine Without Contrast (Final result)  Result time 05/28/24 02:57:54      Final result by Isra Peterson MD  (05/28/24 02:57:54)                   Impression:      There is no evidence for acute thoracic spine fracture.      Electronically signed by: Isra Peterson  Date:    05/28/2024  Time:    02:57               Narrative:    EXAMINATION:  CT THORACIC SPINE WITHOUT CONTRAST    CLINICAL HISTORY:  Spine fracture, thoracic, traumatic;    TECHNIQUE:  CT examination of the thoracic spine was performed.  Axial imaging, sagittal and coronal reconstruction imaging is submitted.    COMPARISON:  None    FINDINGS:  Vertebral body height and alignment appear appropriate, there is no evidence for high-grade spondylolisthesis, there is no evidence for high-grade or acute compression fracture deformity.  There is no evidence for facet dislocation, there is no facet fracture deformity.    There is no high-grade spinal canal stenosis and no evidence for large focal disc protrusion.    The osseous structures appear intact, there is no evidence for acute fracture deformity of the thoracic spine.    Limited imaging of the lungs demonstrates motion artifact, and mild atelectatic change.                                        CT Cervical Spine Without Contrast (Final result)  Result time 05/28/24 01:49:03      Final result by Isra Peterson MD (05/28/24 01:49:03)                   Impression:      Acute fracture involving the posterior arch of C1 on the left, as discussed above.    The remainder of the osseous structures appear intact without additional evidence for acute fracture deformity.    Mild chronic changes without high-grade spinal canal stenosis.    The findings were discussed with Dr. Ramos in the ER at the time of dictation.    This report was flagged in Epic as abnormal.      Electronically signed by: Isra Peterson  Date:    05/28/2024  Time:    01:49               Narrative:    EXAMINATION:  CT CERVICAL SPINE WITHOUT CONTRAST    CLINICAL HISTORY:  Neck trauma, dangerous injury mechanism (Age  16-64y);    TECHNIQUE:  Low dose axial images, sagittal and coronal reformations were performed though the cervical spine.  Contrast was not administered.    COMPARISON:  None    FINDINGS:  There is straightening of the cervical spine.  There is no evidence for high-grade spondylolisthesis, there is no evidence for high-grade or acute compression fracture deformity.  There is no evidence for facet dislocation or facet fracture deformity.  The occipital condyles articulate appropriately with the superior articular facets of C1 at the craniocervical junction.    There is acute fracture deformity involving the posterior arch of C1 on the left, as seen on series 3 axial image 95.  The remainder of the visualized osseous structures appear intact without additional evidence for acute fracture deformity.    There is mild degenerative disc disease at C2-3, without high-grade spinal canal stenosis.  There is mild degenerative disc bulge at C3-4, mild anterior impression upon the dural sac, no high-grade spinal canal stenosis.  Mild chronic changes of the cervical spine otherwise noted, there is no high-grade spinal canal or foraminal stenosis and no evidence for large focal disc protrusion.                                        CT Maxillofacial Without Contrast (Final result)  Result time 05/28/24 01:49:31      Final result by Isra Peterson MD (05/28/24 01:49:31)                   Impression:      There is no evidence for acute facial or orbital fracture.    Left lateral orbital extracranial soft tissue injury noted.    Fracture of the posterior arch of C1 on the left as discussed on the cervical spine CT report.    This report was flagged in Epic as abnormal.      Electronically signed by: Isra Peterson  Date:    05/28/2024  Time:    01:49               Narrative:    EXAMINATION:  CT MAXILLOFACIAL WITHOUT CONTRAST    CLINICAL HISTORY:  Facial trauma, blunt;    TECHNIQUE:  Low dose axial images, sagittal and coronal  reformations were obtained through the face.  Contrast was not administered.    COMPARISON:  None    FINDINGS:  There is extracranial soft tissue injury overlying the left lateral orbital location, without evidence for underlying fracture.  The globes, extraocular muscles and optic nerves of the orbits appear intact, there is no evidence for retrobulbar hematoma.    The paranasal sinuses appear well aerated with minimal mucosal thickening.  The visualized mastoid air cells appear appropriate.  The middle ear cavity bilaterally appears appropriate.    The temporomandibular joints appear intact.  Dental changes are noted, correlation for acute versus chronic dental disease is needed.    There is acute fracture involving the posterior arch of C1 on the left as discussed on the cervical spine CT report.  The visualized facial and orbital bones appear intact without evidence for acute fracture.                                       CT Head Without Contrast (Final result)  Result time 05/28/24 01:49:14      Final result by Isra Peterson MD (05/28/24 01:49:14)                   Impression:      There is no evidence for acute intracranial process.    Extracranial soft tissue injury is noted, there is no evidence for underlying calvarial fracture.    Small metallic density within the right frontal extracranial soft tissues noted for which clinical correlation is needed.    Findings referable to the face, orbits and paranasal sinuses are dictated separately.    Findings referable to the cervical spine dictated separately.      Electronically signed by: Isra Peterson  Date:    05/28/2024  Time:    01:49               Narrative:    EXAMINATION:  CT HEAD WITHOUT CONTRAST    CLINICAL HISTORY:  Facial trauma, blunt;    TECHNIQUE:  Low dose axial images were obtained through the head.  Coronal and sagittal reformations were also performed. Contrast was not administered.    COMPARISON:  None.    FINDINGS:  The ventricular  system, sulcal pattern and parenchymal attenuation characteristics appear appropriate for age, there is no evidence for intracranial mass, mass effect or midline shift, there is no evidence for acute intracranial hemorrhage.  Appropriate CSF spaces are seen at the skull base.    There is a metallic density at the right frontal extracranial location, this may relate to prior injury, clinical correlation is needed.  There is extracranial soft tissue injury overlying the left lateral orbital temporal region, there is no evidence for underlying fracture.  Findings referable to the face, orbits and paranasal sinuses are dictated separately.  The mastoid air cells appear well aerated.  The calvarium appears intact.  Findings referable to the cervical spine are dictated separately.                                       X-Ray Chest AP Portable (Final result)  Result time 05/28/24 01:04:03      Final result by Isra Peterson MD (05/28/24 01:04:03)                   Impression:      There is no radiographic evidence for acute intrathoracic process.      Electronically signed by: Isra Peterson  Date:    05/28/2024  Time:    01:04               Narrative:    EXAMINATION:  XR CHEST AP PORTABLE    CLINICAL HISTORY:  syncope;    TECHNIQUE:  Single frontal view of the chest was performed.    COMPARISON:  Chest radiograph March 24, 2020    FINDINGS:  Single portable chest view is submitted.  There is mild accentuated attenuation consistent with mild soft tissue attenuation associated with body habitus.  The cardiomediastinal silhouette appears appropriate and stable.    Mild atelectatic change noted.  There is no evidence for confluent infiltrate or consolidation, significant pleural effusion or pneumothorax.    The visualized osseous structures appear intact.                                       Medications   sodium chloride 0.9% bolus 1,000 mL 1,000 mL (1,000 mLs Intravenous New Bag 5/28/24 0207)   morphine injection 4 mg  (4 mg Intravenous Given 5/28/24 0207)   ondansetron injection 4 mg (4 mg Intravenous Given 5/28/24 0207)     Medical Decision Making  49 yo female s/p syncopal episode.    Ddx includes orthostatic syncope, vasovagal syncope, occult infection, symptomatic anemia, ICH, fracture from fall, other.    EKG no STEMI.      Our CBC machine is down, but POC Hct 33, which should correlate with Hgb approximately 10.    CMP, mag, cardiac enzymes, TSH reassuring.      Noncon CT head and max/face reassuring, but CT cervical spine shows C1 fracture of L posterior arch.  Patient placed in ccollar.      I discussed patient via [facility] with NSG on call Dr. Rajan Jensen who accepted patient for ER to ER transfer.      I updated patient on CT finding and need for transfer, and she is amenable.      I added CT thoracic and lumbar spine after abnormal imaging of cspine as patient does have midline tenderness.  These have been completed and results pending.    Transfer in progress to [facility] for NSG evaluation.    Amount and/or Complexity of Data Reviewed  Labs: ordered.  Radiology: ordered.    Risk  Prescription drug management.                                      Clinical Impression:  Final diagnoses:  [R55] Syncope  [S12.031A] Closed nondisplaced fracture of posterior arch of first cervical vertebra, initial encounter (Primary)  [R55] Syncope and collapse          ED Disposition Condition    Transfer to Another Facility Lis Shelby MD  05/28/24 [id number]

## 2024-05-28 NOTE — ED TRIAGE NOTES
Juan Carlos Turner, a 48 y.o. female presents to the ED w/ complaint of LOC.  Pt feeling unwell last night and lost consciousness and fell from standing up and hit head.  Pt has abrasion to left side of face.      Triage note:  Chief Complaint   Patient presents with    Loss of Consciousness     States passed out yesterday and had abrasions to left side of face, c/o pain to posterior scalp,      Review of patient's allergies indicates:  No Known Allergies  Past Medical History:   Diagnosis Date    Anxiety     Asthma     DDD (degenerative disc disease), cervical     Diabetes mellitus     Hypertension     JOSUE on CPAP

## 2024-05-29 LAB
BACTERIA UR CULT: NORMAL
BACTERIA UR CULT: NORMAL

## 2024-06-06 ENCOUNTER — DOCUMENTATION ONLY (OUTPATIENT)
Dept: REHABILITATION | Facility: OTHER | Age: 49
End: 2024-06-06
Payer: MEDICARE

## 2024-06-06 NOTE — PROGRESS NOTES
Physical Therapy Treatment Note    Patient was scheduled for physical therapy at [facility] [place] for 6/6/2024. Pt failed to appear for appointment without prior notification for today, as well as 6/4/2024.     Pt was rescheduled and educated on cancel/no-show policy.      Thank you for your referral.    Sincerely,    Lacie Holloway, PT

## 2024-06-07 ENCOUNTER — PATIENT MESSAGE (OUTPATIENT)
Dept: REHABILITATION | Facility: OTHER | Age: 49
End: 2024-06-07
Payer: MEDICARE

## 2024-06-18 ENCOUNTER — DOCUMENTATION ONLY (OUTPATIENT)
Dept: REHABILITATION | Facility: OTHER | Age: 49
End: 2024-06-18

## 2024-06-18 DIAGNOSIS — M54.16 LUMBAR RADICULOPATHY: Primary | ICD-10-CM

## 2024-06-18 NOTE — PROGRESS NOTES
Physical Therapy No Show Note       Patient was scheduled for physical therapy at [facility] for 6/18/2024. Pt failed to appear for appointment without prior notification for today, as well as 5/27, 6/4, and 6/6. Pt has not attended therapy since 5/16/2024.       Per clinic policy, pt has been removed from schedule due to no shows. Pt will need to contact the clinic for future appointments.        Ludy Darden, PT

## 2024-08-07 DIAGNOSIS — M54.2 CERVICAL PAIN (NECK): Primary | ICD-10-CM

## 2024-10-22 LAB — CRC RECOMMENDATION EXT: NORMAL

## 2024-10-23 ENCOUNTER — TELEPHONE (OUTPATIENT)
Dept: ENDOSCOPY | Facility: HOSPITAL | Age: 49
End: 2024-10-23
Payer: MEDICARE

## 2024-10-23 NOTE — TELEPHONE ENCOUNTER
Telephoned pt to see if she was interested in scheduling colonoscopy.  Spoke with pt and she stated she had already had her colonoscopy completed at [facility].

## 2024-11-15 ENCOUNTER — PATIENT OUTREACH (OUTPATIENT)
Dept: ADMINISTRATIVE | Facility: HOSPITAL | Age: 49
End: 2024-11-15
Payer: MEDICARE

## 2024-12-02 ENCOUNTER — OFFICE VISIT (OUTPATIENT)
Dept: INTERNAL MEDICINE | Facility: CLINIC | Age: 49
End: 2024-12-02
Payer: MEDICARE

## 2024-12-02 ENCOUNTER — TELEPHONE (OUTPATIENT)
Dept: INTERNAL MEDICINE | Facility: CLINIC | Age: 49
End: 2024-12-02

## 2024-12-02 ENCOUNTER — LAB VISIT (OUTPATIENT)
Dept: LAB | Facility: OTHER | Age: 49
End: 2024-12-02
Payer: MEDICARE

## 2024-12-02 VITALS
SYSTOLIC BLOOD PRESSURE: 130 MMHG | DIASTOLIC BLOOD PRESSURE: 90 MMHG | WEIGHT: 201.25 LBS | BODY MASS INDEX: 32.34 KG/M2 | HEART RATE: 70 BPM | HEIGHT: 66 IN | OXYGEN SATURATION: 98 %

## 2024-12-02 DIAGNOSIS — G89.29 CHRONIC PAIN OF RIGHT LOWER EXTREMITY: ICD-10-CM

## 2024-12-02 DIAGNOSIS — Z00.00 ANNUAL PHYSICAL EXAM: ICD-10-CM

## 2024-12-02 DIAGNOSIS — Z00.00 ANNUAL PHYSICAL EXAM: Primary | ICD-10-CM

## 2024-12-02 DIAGNOSIS — M79.604 CHRONIC PAIN OF RIGHT LOWER EXTREMITY: ICD-10-CM

## 2024-12-02 DIAGNOSIS — R79.9 ABNORMAL FINDING OF BLOOD CHEMISTRY, UNSPECIFIED: ICD-10-CM

## 2024-12-02 DIAGNOSIS — F43.10 PTSD (POST-TRAUMATIC STRESS DISORDER): ICD-10-CM

## 2024-12-02 DIAGNOSIS — E78.5 HLD (HYPERLIPIDEMIA): ICD-10-CM

## 2024-12-02 DIAGNOSIS — F33.1 MODERATE EPISODE OF RECURRENT MAJOR DEPRESSIVE DISORDER: ICD-10-CM

## 2024-12-02 DIAGNOSIS — F39 MOOD DISORDER: ICD-10-CM

## 2024-12-02 DIAGNOSIS — E11.9 T2DM (TYPE 2 DIABETES MELLITUS): ICD-10-CM

## 2024-12-02 LAB
ALBUMIN SERPL BCP-MCNC: 4.3 G/DL (ref 3.5–5.2)
ALP SERPL-CCNC: 127 U/L (ref 40–150)
ALT SERPL W/O P-5'-P-CCNC: 18 U/L (ref 10–44)
ANION GAP SERPL CALC-SCNC: 10 MMOL/L (ref 8–16)
AST SERPL-CCNC: 21 U/L (ref 10–40)
BASOPHILS # BLD AUTO: 0.03 K/UL (ref 0–0.2)
BASOPHILS NFR BLD: 0.6 % (ref 0–1.9)
BILIRUB SERPL-MCNC: 0.4 MG/DL (ref 0.1–1)
BUN SERPL-MCNC: 9 MG/DL (ref 6–20)
CALCIUM SERPL-MCNC: 9.6 MG/DL (ref 8.7–10.5)
CHLORIDE SERPL-SCNC: 102 MMOL/L (ref 95–110)
CHOLEST SERPL-MCNC: 200 MG/DL (ref 120–199)
CHOLEST/HDLC SERPL: 2.3 {RATIO} (ref 2–5)
CO2 SERPL-SCNC: 27 MMOL/L (ref 23–29)
CREAT SERPL-MCNC: 0.8 MG/DL (ref 0.5–1.4)
DIFFERENTIAL METHOD BLD: ABNORMAL
EOSINOPHIL # BLD AUTO: 0.2 K/UL (ref 0–0.5)
EOSINOPHIL NFR BLD: 3.6 % (ref 0–8)
ERYTHROCYTE [DISTWIDTH] IN BLOOD BY AUTOMATED COUNT: 13.2 % (ref 11.5–14.5)
EST. GFR  (NO RACE VARIABLE): >60 ML/MIN/1.73 M^2
ESTIMATED AVG GLUCOSE: 131 MG/DL (ref 68–131)
GLUCOSE SERPL-MCNC: 98 MG/DL (ref 70–110)
HBA1C MFR BLD: 6.2 % (ref 4–5.6)
HCT VFR BLD AUTO: 39.8 % (ref 37–48.5)
HCV AB SERPL QL IA: NEGATIVE
HDLC SERPL-MCNC: 86 MG/DL (ref 40–75)
HDLC SERPL: 43 % (ref 20–50)
HGB BLD-MCNC: 12.5 G/DL (ref 12–16)
HIV 1+2 AB+HIV1 P24 AG SERPL QL IA: NEGATIVE
IMM GRANULOCYTES # BLD AUTO: 0 K/UL (ref 0–0.04)
IMM GRANULOCYTES NFR BLD AUTO: 0 % (ref 0–0.5)
LDLC SERPL CALC-MCNC: 97.6 MG/DL (ref 63–159)
LYMPHOCYTES # BLD AUTO: 2 K/UL (ref 1–4.8)
LYMPHOCYTES NFR BLD: 40.4 % (ref 18–48)
MCH RBC QN AUTO: 26.5 PG (ref 27–31)
MCHC RBC AUTO-ENTMCNC: 31.4 G/DL (ref 32–36)
MCV RBC AUTO: 84 FL (ref 82–98)
MONOCYTES # BLD AUTO: 0.5 K/UL (ref 0.3–1)
MONOCYTES NFR BLD: 9.7 % (ref 4–15)
NEUTROPHILS # BLD AUTO: 2.3 K/UL (ref 1.8–7.7)
NEUTROPHILS NFR BLD: 45.7 % (ref 38–73)
NONHDLC SERPL-MCNC: 114 MG/DL
NRBC BLD-RTO: 0 /100 WBC
PLATELET # BLD AUTO: 314 K/UL (ref 150–450)
PMV BLD AUTO: 11.7 FL (ref 9.2–12.9)
POTASSIUM SERPL-SCNC: 3.7 MMOL/L (ref 3.5–5.1)
PROT SERPL-MCNC: 8 G/DL (ref 6–8.4)
RBC # BLD AUTO: 4.72 M/UL (ref 4–5.4)
SODIUM SERPL-SCNC: 139 MMOL/L (ref 136–145)
TRIGL SERPL-MCNC: 82 MG/DL (ref 30–150)
TSH SERPL DL<=0.005 MIU/L-ACNC: 1.38 UIU/ML (ref 0.4–4)
WBC # BLD AUTO: 4.97 K/UL (ref 3.9–12.7)

## 2024-12-02 PROCEDURE — 4010F ACE/ARB THERAPY RXD/TAKEN: CPT | Mod: CPTII,S$GLB,,

## 2024-12-02 PROCEDURE — 36415 COLL VENOUS BLD VENIPUNCTURE: CPT

## 2024-12-02 PROCEDURE — 3075F SYST BP GE 130 - 139MM HG: CPT | Mod: CPTII,S$GLB,,

## 2024-12-02 PROCEDURE — 85025 COMPLETE CBC W/AUTO DIFF WBC: CPT

## 2024-12-02 PROCEDURE — 84443 ASSAY THYROID STIM HORMONE: CPT

## 2024-12-02 PROCEDURE — 83036 HEMOGLOBIN GLYCOSYLATED A1C: CPT

## 2024-12-02 PROCEDURE — 80053 COMPREHEN METABOLIC PANEL: CPT

## 2024-12-02 PROCEDURE — 99999 PR PBB SHADOW E&M-EST. PATIENT-LVL IV: CPT | Mod: PBBFAC,,,

## 2024-12-02 PROCEDURE — 3080F DIAST BP >= 90 MM HG: CPT | Mod: CPTII,S$GLB,,

## 2024-12-02 PROCEDURE — 87389 HIV-1 AG W/HIV-1&-2 AB AG IA: CPT

## 2024-12-02 PROCEDURE — 99204 OFFICE O/P NEW MOD 45 MIN: CPT | Mod: S$GLB,,,

## 2024-12-02 PROCEDURE — 86803 HEPATITIS C AB TEST: CPT

## 2024-12-02 PROCEDURE — 3008F BODY MASS INDEX DOCD: CPT | Mod: CPTII,S$GLB,,

## 2024-12-02 PROCEDURE — 1160F RVW MEDS BY RX/DR IN RCRD: CPT | Mod: CPTII,S$GLB,,

## 2024-12-02 PROCEDURE — 1159F MED LIST DOCD IN RCRD: CPT | Mod: CPTII,S$GLB,,

## 2024-12-02 PROCEDURE — 80061 LIPID PANEL: CPT

## 2024-12-02 RX ORDER — DULOXETINE 40 MG/1
40 CAPSULE, DELAYED RELEASE ORAL DAILY
Qty: 90 CAPSULE | Refills: 0 | Status: SHIPPED | OUTPATIENT
Start: 2024-12-02 | End: 2025-12-02

## 2024-12-02 RX ORDER — HYDROXYZINE HYDROCHLORIDE 50 MG/1
50 TABLET, FILM COATED ORAL EVERY 8 HOURS PRN
Qty: 40 EACH | Refills: 0 | Status: SHIPPED | OUTPATIENT
Start: 2024-12-02

## 2024-12-02 RX ORDER — DULOXETIN HYDROCHLORIDE 20 MG/1
20 CAPSULE, DELAYED RELEASE ORAL DAILY
Qty: 30 CAPSULE | Refills: 11 | Status: CANCELLED | OUTPATIENT
Start: 2024-12-02 | End: 2025-12-02

## 2024-12-02 NOTE — TELEPHONE ENCOUNTER
----- Message from Arlen sent at 12/2/2024  1:36 PM CST -----  Type: Patient call    Who called: Patient    Does the patient know what this is regarding? Pharmacy stated they need a PA for medication DULoxetine 40 mg CpDR; please advise     Would the patient rather a call back or response via [facility]? Call    Best call back number: [phone]    Additional information:    [facility] - [address] [address]  Phone: [phone] Fax: [phone]
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
## 2024-12-02 NOTE — TELEPHONE ENCOUNTER
Called but no answer. Attempted to LVM but VM box was not set up.     PA started for DULoxetine 40 mg CpDR in [employer].    KEY: [id number]

## 2024-12-02 NOTE — TELEPHONE ENCOUNTER
PA for DULoxetine 40 mg CpDR in [employer] was approved 12/02/24.     KEY: [id number]    Approved today by [facility] 2017  PA Case: [id number], Status: Approved, Coverage Starts on: 1/1/2024 12:00:00 AM, Coverage Ends on: 12/31/2025 12:00:00 AM. Questions? Contact [phone].  Authorization Expiration Date: 12/30/2025     Called and spoke to pharmacy and informed that the PA was approved. Pharmacy stated they would get it filled for pt and would let her know.

## 2024-12-12 ENCOUNTER — OFFICE VISIT (OUTPATIENT)
Dept: PAIN MEDICINE | Facility: CLINIC | Age: 49
End: 2024-12-12
Payer: MEDICARE

## 2024-12-12 VITALS
WEIGHT: 205.5 LBS | SYSTOLIC BLOOD PRESSURE: 132 MMHG | HEART RATE: 72 BPM | BODY MASS INDEX: 33.16 KG/M2 | DIASTOLIC BLOOD PRESSURE: 92 MMHG | OXYGEN SATURATION: 98 % | RESPIRATION RATE: 18 BRPM | TEMPERATURE: 99 F

## 2024-12-12 DIAGNOSIS — M54.17 LUMBOSACRAL RADICULOPATHY: Primary | ICD-10-CM

## 2024-12-12 DIAGNOSIS — M51.362 DEGENERATION OF INTERVERTEBRAL DISC OF LUMBAR REGION WITH DISCOGENIC BACK PAIN AND LOWER EXTREMITY PAIN: ICD-10-CM

## 2024-12-12 PROCEDURE — 3080F DIAST BP >= 90 MM HG: CPT | Mod: CPTII,S$GLB,, | Performed by: ANESTHESIOLOGY

## 2024-12-12 PROCEDURE — 3008F BODY MASS INDEX DOCD: CPT | Mod: CPTII,S$GLB,, | Performed by: ANESTHESIOLOGY

## 2024-12-12 PROCEDURE — 4010F ACE/ARB THERAPY RXD/TAKEN: CPT | Mod: CPTII,S$GLB,, | Performed by: ANESTHESIOLOGY

## 2024-12-12 PROCEDURE — 1159F MED LIST DOCD IN RCRD: CPT | Mod: CPTII,S$GLB,, | Performed by: ANESTHESIOLOGY

## 2024-12-12 PROCEDURE — 99204 OFFICE O/P NEW MOD 45 MIN: CPT | Mod: S$GLB,,, | Performed by: ANESTHESIOLOGY

## 2024-12-12 PROCEDURE — 3075F SYST BP GE 130 - 139MM HG: CPT | Mod: CPTII,S$GLB,, | Performed by: ANESTHESIOLOGY

## 2024-12-12 PROCEDURE — 3044F HG A1C LEVEL LT 7.0%: CPT | Mod: CPTII,S$GLB,, | Performed by: ANESTHESIOLOGY

## 2024-12-12 PROCEDURE — 99999 PR PBB SHADOW E&M-EST. PATIENT-LVL IV: CPT | Mod: PBBFAC,,, | Performed by: ANESTHESIOLOGY

## 2024-12-12 RX ORDER — PREGABALIN 100 MG/1
100 CAPSULE ORAL 3 TIMES DAILY
Qty: 90 CAPSULE | Refills: 2 | Status: SHIPPED | OUTPATIENT
Start: 2024-12-12
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
## 2024-12-13 ENCOUNTER — HOSPITAL ENCOUNTER (EMERGENCY)
Facility: OTHER | Age: 49
Discharge: HOME | End: 2024-12-13
Attending: EMERGENCY MEDICINE
Payer: MEDICARE

## 2024-12-13 ENCOUNTER — PATIENT MESSAGE (OUTPATIENT)
Dept: PAIN MEDICINE | Facility: OTHER | Age: 49
End: 2024-12-13
Payer: MEDICARE

## 2024-12-13 VITALS
HEART RATE: 62 BPM | SYSTOLIC BLOOD PRESSURE: 190 MMHG | TEMPERATURE: 98 F | DIASTOLIC BLOOD PRESSURE: 104 MMHG | BODY MASS INDEX: 32.62 KG/M2 | RESPIRATION RATE: 18 BRPM | WEIGHT: 203 LBS | HEIGHT: 66 IN | OXYGEN SATURATION: 100 %
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
## 2024-12-13 DIAGNOSIS — S06.0X0A CONCUSSION WITHOUT LOSS OF CONSCIOUSNESS, INITIAL ENCOUNTER: ICD-10-CM

## 2024-12-13 PROCEDURE — 99284 EMERGENCY DEPT VISIT MOD MDM: CPT | Mod: 25

## 2024-12-13 PROCEDURE — 25000003 PHARM REV CODE 250: Performed by: NURSE PRACTITIONER

## 2024-12-13 RX ORDER — IBUPROFEN 600 MG/1
600 TABLET ORAL
Status: COMPLETED | OUTPATIENT
Start: 2024-12-13 | End: 2024-12-13

## 2024-12-13 RX ORDER — IBUPROFEN 600 MG/1
600 TABLET ORAL EVERY 6 HOURS PRN
Qty: 20 TABLET | Refills: 0 | Status: SHIPPED | OUTPATIENT
Start: 2024-12-13 | End: 2024-12-18

## 2024-12-13 RX ORDER — AMLODIPINE BESYLATE 5 MG/1
10 TABLET ORAL
Status: COMPLETED | OUTPATIENT
Start: 2024-12-13 | End: 2024-12-13

## 2024-12-13 RX ORDER — LIDOCAINE 50 MG/G
1 PATCH TOPICAL
Status: DISCONTINUED | OUTPATIENT
Start: 2024-12-13 | End: 2024-12-13 | Stop reason: HOSPADM

## 2024-12-13 RX ORDER — HYDROXYZINE PAMOATE 25 MG/1
50 CAPSULE ORAL
Status: COMPLETED | OUTPATIENT
Start: 2024-12-13 | End: 2024-12-13

## 2024-12-13 RX ORDER — METHOCARBAMOL 500 MG/1
1000 TABLET, FILM COATED ORAL 3 TIMES DAILY
Qty: 30 TABLET | Refills: 0 | Status: SHIPPED | OUTPATIENT
Start: 2024-12-13 | End: 2024-12-18

## 2024-12-13 RX ORDER — LOSARTAN POTASSIUM 25 MG/1
25 TABLET ORAL DAILY
Status: DISCONTINUED | OUTPATIENT
Start: 2024-12-14 | End: 2024-12-13 | Stop reason: HOSPADM

## 2024-12-13 RX ORDER — LIDOCAINE 50 MG/G
1 PATCH TOPICAL DAILY
Qty: 5 PATCH | Refills: 0 | Status: SHIPPED | OUTPATIENT
Start: 2024-12-13

## 2024-12-13 RX ORDER — METHOCARBAMOL 500 MG/1
1000 TABLET, FILM COATED ORAL
Status: COMPLETED | OUTPATIENT
Start: 2024-12-13 | End: 2024-12-13

## 2024-12-13 RX ADMIN — AMLODIPINE BESYLATE 10 MG: 5 TABLET ORAL at 05:12

## 2024-12-13 RX ADMIN — HYDROXYZINE PAMOATE 50 MG: 25 CAPSULE ORAL at 05:12

## 2024-12-13 RX ADMIN — IBUPROFEN 600 MG: 600 TABLET, FILM COATED ORAL at 04:12

## 2024-12-13 RX ADMIN — METHOCARBAMOL 1000 MG: 500 TABLET ORAL at 04:12

## 2024-12-13 RX ADMIN — LIDOCAINE 1 PATCH: 50 PATCH CUTANEOUS at 04:12
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
## 2024-12-13 NOTE — DISCHARGE INSTRUCTIONS
You were seen and evaluated in the ER today.  Your imaging is negative for any acute abnormalities.  Your pain is likely due to a mild concussion.  We have prescribed you medications to help with your pain.  Warm compresses or hot bath soaks may help with your pain.  Please follow-up with your PCP as needed.  Please return to the ED for any worsening symptoms such as chest pain, shortness of breath, fever not controlled with Tylenol or ibuprofen or uncontrolled pain.      Our goal in the emergency department is to always give you outstanding care and exceptional service. You may receive a survey by mail or e-mail in the next week regarding your experience in our ED. We would greatly appreciate your completing and returning the survey. Your feedback provides us with a way to recognize our staff who give very good care and it helps us learn how to improve when your experience was below our aspiration of excellence.
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
## 2024-12-13 NOTE — ED NOTES
LOC: The patient is awake, alert, and oriented to self, place, time, and situation. Pt is calm and cooperative. Affect is appropriate.  Speech is appropriate and clear.     APPEARANCE: Patient resting on stretcher; appears uncomfortable but in no acute distress.  Patient is clean and well groomed.    SKIN: The skin is warm and dry; color consistent with ethnicity.  Patient has normal skin turgor and moist mucus membranes.  Skin intact; no breakdown or bruising noted.     MUSCULOSKELETAL: Patient moving upper and lower extremities without difficulty; denies pain in the extremities but reports pain running down the middle of her back.  Denies weakness.     RESPIRATORY: Airway is open and patent. Respirations spontaneous, even, easy, and non-labored.  Patient has a normal effort and rate.  No accessory muscle use noted. Denies cough.     CARDIAC:  Normal heart rate noted.  No peripheral edema noted. No complaints of chest pain.     ABDOMEN: Soft and non tender to palpation.  No distention noted. Pt denies abdominal pain; denies nausea, vomiting, diarrhea, or constipation.    NEUROLOGIC: Eyes open spontaneously.  Behavior appropriate to situation.  Follows commands; facial expression symmetrical.  Purposeful motor response noted; normal sensation in all extremities. Pt reports pain to the back and right side of the head; denies lightheadedness or dizziness; denies visual disturbances currently; denies loss of balance; denies unilateral weakness.
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
## 2024-12-13 NOTE — Clinical Note
"Juan Carlos "Juan Carlosjaymie Turner was seen and treated in our emergency department on 12/13/2024.  She may return to work on 12/17/2024.       If you have any questions or concerns, please don't hesitate to call.      Christy Brooks, NP"
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
## 2024-12-16 ENCOUNTER — OFFICE VISIT (OUTPATIENT)
Dept: ORTHOPEDICS | Facility: CLINIC | Age: 49
End: 2024-12-16
Payer: MEDICARE

## 2024-12-16 ENCOUNTER — TELEPHONE (OUTPATIENT)
Dept: INTERNAL MEDICINE | Facility: CLINIC | Age: 49
End: 2024-12-16
Payer: MEDICARE

## 2024-12-16 ENCOUNTER — HOSPITAL ENCOUNTER (OUTPATIENT)
Dept: RADIOLOGY | Facility: HOSPITAL | Age: 49
Discharge: HOME | End: 2024-12-16
Attending: ORTHOPAEDIC SURGERY
Payer: MEDICARE

## 2024-12-16 VITALS — BODY MASS INDEX: 33.55 KG/M2 | WEIGHT: 208.75 LBS | HEIGHT: 66 IN

## 2024-12-16 DIAGNOSIS — M54.2 NECK PAIN: ICD-10-CM

## 2024-12-16 DIAGNOSIS — M50.30 DDD (DEGENERATIVE DISC DISEASE), CERVICAL: ICD-10-CM

## 2024-12-16 DIAGNOSIS — M48.02 SPINAL STENOSIS, CERVICAL REGION: ICD-10-CM

## 2024-12-16 DIAGNOSIS — M54.2 CERVICAL PAIN (NECK): ICD-10-CM

## 2024-12-16 DIAGNOSIS — M54.12 CERVICAL RADICULOPATHY: Primary | ICD-10-CM

## 2024-12-16 PROCEDURE — 3008F BODY MASS INDEX DOCD: CPT | Mod: CPTII,S$GLB,, | Performed by: ORTHOPAEDIC SURGERY

## 2024-12-16 PROCEDURE — 72040 X-RAY EXAM NECK SPINE 2-3 VW: CPT | Mod: TC

## 2024-12-16 PROCEDURE — 1159F MED LIST DOCD IN RCRD: CPT | Mod: CPTII,S$GLB,, | Performed by: ORTHOPAEDIC SURGERY

## 2024-12-16 PROCEDURE — 1160F RVW MEDS BY RX/DR IN RCRD: CPT | Mod: CPTII,S$GLB,, | Performed by: ORTHOPAEDIC SURGERY

## 2024-12-16 PROCEDURE — 99204 OFFICE O/P NEW MOD 45 MIN: CPT | Mod: S$GLB,,, | Performed by: ORTHOPAEDIC SURGERY

## 2024-12-16 PROCEDURE — 72040 X-RAY EXAM NECK SPINE 2-3 VW: CPT | Mod: 26,,, | Performed by: INTERNAL MEDICINE

## 2024-12-16 PROCEDURE — 3044F HG A1C LEVEL LT 7.0%: CPT | Mod: CPTII,S$GLB,, | Performed by: ORTHOPAEDIC SURGERY

## 2024-12-16 PROCEDURE — 99999 PR PBB SHADOW E&M-EST. PATIENT-LVL IV: CPT | Mod: PBBFAC,,, | Performed by: ORTHOPAEDIC SURGERY

## 2024-12-16 PROCEDURE — 4010F ACE/ARB THERAPY RXD/TAKEN: CPT | Mod: CPTII,S$GLB,, | Performed by: ORTHOPAEDIC SURGERY

## 2024-12-16 NOTE — TELEPHONE ENCOUNTER
----- Message from Del sent at 12/16/2024  9:44 AM CST -----  Regarding: Referral  Name of Who is Calling:  Patient          What is the request in detail:  Please contact patient she need a referral for an appointment she missed on 12/12/2024            Can the clinic reply by [facility]: No            [facility]: [phone]
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
## 2024-12-16 NOTE — TELEPHONE ENCOUNTER
Returned telephone call and spoke to patient. Patient was requesting another referral because she believed she missed her appointment on 12/12/24 with pain management. Informed patient that the notes from that encounter stated she was present at the appointment. Patient stated she does not remember attending the appointment but that she did know she had some type of appointment on 12/31/24 with pain management. Informed patient that it does seem like she has an upcoming procedure but I was unsure of what it was for. Patient stated it was for shots. Informed patient I would forward a message to Steff Thrasher MD. Staff to see if they could contact patient with more details. Patient verbaized gratitude and understanding.     Routing to Steff Thrasher MD staff. Please contact patient to inform more about upcoming procedure. Let me know if theres anything else I can do.

## 2024-12-21 ENCOUNTER — HOSPITAL ENCOUNTER (OUTPATIENT)
Dept: RADIOLOGY | Facility: OTHER | Age: 49
Discharge: HOME | End: 2024-12-21
Attending: ORTHOPAEDIC SURGERY
Payer: MEDICARE

## 2024-12-21 DIAGNOSIS — M48.02 SPINAL STENOSIS, CERVICAL REGION: ICD-10-CM

## 2024-12-21 PROCEDURE — 72141 MRI NECK SPINE W/O DYE: CPT | Mod: TC

## 2024-12-21 PROCEDURE — 72141 MRI NECK SPINE W/O DYE: CPT | Mod: 26,,, | Performed by: RADIOLOGY
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
## 2024-12-24 ENCOUNTER — OFFICE VISIT (OUTPATIENT)
Dept: ORTHOPEDICS | Facility: CLINIC | Age: 49
End: 2024-12-24
Attending: ORTHOPAEDIC SURGERY
Payer: MEDICARE

## 2024-12-24 DIAGNOSIS — M54.12 CERVICAL RADICULOPATHY: Primary | ICD-10-CM

## 2024-12-24 PROCEDURE — 99441 PR PHYSICIAN TELEPHONE EVALUATION 5-10 MIN: CPT | Mod: 95,,, | Performed by: ORTHOPAEDIC SURGERY

## 2024-12-24 PROCEDURE — 4010F ACE/ARB THERAPY RXD/TAKEN: CPT | Mod: CPTII,95,, | Performed by: ORTHOPAEDIC SURGERY

## 2024-12-24 PROCEDURE — 3044F HG A1C LEVEL LT 7.0%: CPT | Mod: CPTII,95,, | Performed by: ORTHOPAEDIC SURGERY

## 2024-12-31 ENCOUNTER — HOSPITAL ENCOUNTER (OUTPATIENT)
Facility: OTHER | Age: 49
Discharge: HOME | End: 2024-12-31
Attending: ANESTHESIOLOGY | Admitting: ANESTHESIOLOGY
Payer: MEDICARE

## 2024-12-31 VITALS
RESPIRATION RATE: 18 BRPM | TEMPERATURE: 98 F | OXYGEN SATURATION: 96 % | WEIGHT: 203 LBS | DIASTOLIC BLOOD PRESSURE: 80 MMHG | HEIGHT: 66 IN | SYSTOLIC BLOOD PRESSURE: 125 MMHG | BODY MASS INDEX: 32.62 KG/M2 | HEART RATE: 64 BPM

## 2024-12-31 DIAGNOSIS — G89.29 CHRONIC PAIN: ICD-10-CM

## 2024-12-31 DIAGNOSIS — M54.16 LUMBAR RADICULOPATHY: Primary | ICD-10-CM

## 2024-12-31 LAB — POCT GLUCOSE: 119 MG/DL (ref 70–110)

## 2024-12-31 PROCEDURE — 64484 NJX AA&/STRD TFRM EPI L/S EA: CPT | Mod: RT,,, | Performed by: ANESTHESIOLOGY

## 2024-12-31 PROCEDURE — 99152 MOD SED SAME PHYS/QHP 5/>YRS: CPT | Mod: ,,, | Performed by: ANESTHESIOLOGY

## 2024-12-31 PROCEDURE — 64483 NJX AA&/STRD TFRM EPI L/S 1: CPT | Mod: RT | Performed by: ANESTHESIOLOGY

## 2024-12-31 PROCEDURE — 25500020 PHARM REV CODE 255: Performed by: ANESTHESIOLOGY

## 2024-12-31 PROCEDURE — 82962 GLUCOSE BLOOD TEST: CPT | Performed by: ANESTHESIOLOGY

## 2024-12-31 PROCEDURE — 64484 NJX AA&/STRD TFRM EPI L/S EA: CPT | Mod: RT | Performed by: ANESTHESIOLOGY

## 2024-12-31 PROCEDURE — 64483 NJX AA&/STRD TFRM EPI L/S 1: CPT | Mod: RT,,, | Performed by: ANESTHESIOLOGY

## 2024-12-31 PROCEDURE — 63600175 PHARM REV CODE 636 W HCPCS: Performed by: ANESTHESIOLOGY

## 2024-12-31 RX ORDER — DEXAMETHASONE SODIUM PHOSPHATE 10 MG/ML
INJECTION INTRAMUSCULAR; INTRAVENOUS
Status: DISCONTINUED | OUTPATIENT
Start: 2024-12-31 | End: 2024-12-31 | Stop reason: HOSPADM

## 2024-12-31 RX ORDER — LIDOCAINE HYDROCHLORIDE 10 MG/ML
INJECTION, SOLUTION EPIDURAL; INFILTRATION; INTRACAUDAL; PERINEURAL
Status: DISCONTINUED | OUTPATIENT
Start: 2024-12-31 | End: 2024-12-31 | Stop reason: HOSPADM

## 2024-12-31 RX ORDER — SODIUM CHLORIDE 9 MG/ML
INJECTION, SOLUTION INTRAVENOUS CONTINUOUS
Status: DISCONTINUED | OUTPATIENT
Start: 2024-12-31 | End: 2024-12-31 | Stop reason: HOSPADM

## 2024-12-31 RX ORDER — LIDOCAINE HYDROCHLORIDE 20 MG/ML
INJECTION, SOLUTION INFILTRATION; PERINEURAL
Status: DISCONTINUED | OUTPATIENT
Start: 2024-12-31 | End: 2024-12-31 | Stop reason: HOSPADM

## 2024-12-31 RX ORDER — MIDAZOLAM HYDROCHLORIDE 1 MG/ML
INJECTION INTRAMUSCULAR; INTRAVENOUS
Status: DISCONTINUED | OUTPATIENT
Start: 2024-12-31 | End: 2024-12-31 | Stop reason: HOSPADM

## 2024-12-31 RX ORDER — FENTANYL CITRATE 50 UG/ML
INJECTION, SOLUTION INTRAMUSCULAR; INTRAVENOUS
Status: DISCONTINUED | OUTPATIENT
Start: 2024-12-31 | End: 2024-12-31 | Stop reason: HOSPADM

## 2024-12-31 NOTE — DISCHARGE INSTRUCTIONS

## 2024-12-31 NOTE — OP NOTE
Lumbar Transforaminal Epidural Steroid Injection under Fluoroscopic Guidance    The procedure, risks, benefits, and options were discussed with the patient. There are no contraindications to the procedure. The patent expressed understanding and agreed to the procedure. Informed written consent was obtained prior to the start of the procedure and can be found in the patient's chart.    PATIENT NAME: Juan Carlos Turner   MRN: [id number]     DATE OF PROCEDURE: 12/31/2024    PROCEDURE:  Right  L3/4 and L4/5 Lumbar Transforaminal Epidural Steroid Injection under Fluoroscopic Guidance    PRE-OP DIAGNOSIS: Lumbosacral radiculopathy [M54.17] Lumbar radiculopathy [M54.16]    POST-OP DIAGNOSIS: Same    PHYSICIAN: Steff Thrasher MD    ASSISTANTS: Dr. Keller     MEDICATIONS INJECTED: Preservative-free Decadron 10mg with 5cc of Lidocaine 1% MPF     LOCAL ANESTHETIC INJECTED: Xylocaine 2%     SEDATION: Versed 1.5 and Fentanyl 50mcg                                                                                                                                                                                     Conscious sedation ordered by M.D. Patient re-evaluation prior to administration of conscious sedation. No changes noted in patient's status from initial evaluation. The patient's vital signs were monitored by RN and patient remained hemodynamically stable throughout the procedure.    Event Time In   Sedation Start 0954   Sedation End 1001       ESTIMATED BLOOD LOSS: None    COMPLICATIONS: None    TECHNIQUE: Time-out was performed to identify the patient and procedure to be performed. With the patient laying in a prone position, the surgical area was prepped and draped in the usual sterile fashion using ChloraPrep and a fenestrated drape.The levels were determined under fluoroscopy guidance. Skin anesthesia was achieved by injecting Lidocaine 2% over the injection sites. The transforaminal spaces were then approached with a 22  gauge, 5 inch spinal quinke needle that was introduced under fluoroscopic guidance in the AP and Lateral views. Once the needle tip was in the area of the transforaminal space, and there was no blood, CSF or paraesthesias, contrast dye Omnipaque (300mg/mL) was injected to confirm placement and there was no vascular runoff. Fluoroscopic imaging in the AP and lateral views revealed a clear outline of the spinal nerve with proximal spread of agent through the neural foramen into the epidural space. 3 mL of the medication mixture listed above was injected slowly at each site. Displacement of the radio opaque contrast after injection of the medication confirmed that the medication went into the area of the transforaminal spaces. The needles were removed and bleeding was nil. A sterile dressing was applied. No specimens collected. The patient tolerated the procedure well.     The patient was monitored after the procedure in the recovery area. They were given post-procedure and discharge instructions to follow at home. The patient was discharged in a stable condition.    I reviewed and edited the fellow's note. I conducted my own interview and physical examination. I agree with the findings. I was present and supervising all critical portions of the procedure.    KARMEN Keller MD

## 2024-12-31 NOTE — DISCHARGE SUMMARY
Discharge Note  Short Stay      SUMMARY     Admit Date: 12/31/2024    Attending Physician: Steff Thrasher MD    Discharge Physician: Steff Thrasher MD      Discharge Date: 12/31/2024 10:01 AM    Procedure(s) (LRB):  LUMBAR TRANSFORAMINAL RIGHT L3/4 AND L4/5 (Right)    Final Diagnosis: Lumbosacral radiculopathy [M54.17]    Disposition: Home or self care    Patient Instructions:   Current Discharge Medication List        CONTINUE these medications which have NOT CHANGED    Details   amitriptyline (ELAVIL) 50 MG tablet Take 50 mg by mouth nightly.      amlodipine (NORVASC) 10 MG tablet Take 10 mg by mouth once daily.      atorvastatin (LIPITOR) 40 MG tablet Take 40 mg by mouth once daily.      DULoxetine 40 mg CpDR Take 40 mg by mouth once daily.  Qty: 90 capsule, Refills: 0    Associated Diagnoses: Chronic pain of right lower extremity; Moderate episode of recurrent major depressive disorder      hydrOXYzine HCL (ATARAX) 50 MG tablet Take 1 tablet (50 mg total) by mouth every 8 (eight) hours as needed for Anxiety (Insomnia/panic attacks).  Qty: 40 each, Refills: 0    Associated Diagnoses: PTSD (post-traumatic stress disorder)      ketotifen (ZADITOR) 0.025 % (0.035 %) ophthalmic solution INSTILL ONE DROP IN AFFECTED EYE ONCE A DAY AS NEEDED FOR ITCHY EYES      LIDOcaine (LIDODERM) 5 % Place 1 patch onto the skin once daily.  Qty: 5 patch, Refills: 0      losartan (COZAAR) 25 MG tablet Take 25 mg by mouth once daily.      metFORMIN (GLUCOPHAGE) 1000 MG tablet Take 1,000 mg by mouth daily with breakfast.      omeprazole (PRILOSEC) 20 MG capsule Take 1 capsule by mouth once daily.      ONETOUCH DELICA LANCETS 33 gauge Misc TEST ONCE D  Refills: 3      ONETOUCH ULTRA BLUE TEST STRIP Strp TEST ONCE D BEFORE BREAKFAST  Refills: 3      ONETOUCH ULTRAMINI kit UTD  Refills: 0      pregabalin (LYRICA) 100 MG capsule Take 1 capsule (100 mg total) by mouth 3 (three) times daily.  Qty: 90 capsule, Refills: 2    Associated  Diagnoses: Degeneration of intervertebral disc of lumbar region with discogenic back pain and lower extremity pain; Lumbosacral radiculopathy      tobramycin sulfate 0.3% (TOBREX) 0.3 % ophthalmic solution 1-2 drops topically twice daily to affected toe(s).  Qty: 5 mL, Refills: 3                 Discharge Diagnosis: Lumbosacral radiculopathy [M54.17]  Condition on Discharge: Stable with no complications to procedure   Diet on Discharge: Same as before.  Activity: as per instruction sheet.  Discharge to: Home with a responsible adult.  Follow up: 2-4 weeks       Please call my office or pager at [phone] if experienced any weakness or loss of sensation, fever > 101.5, pain uncontrolled with oral medications, persistent nausea/vomiting/or diarrhea, redness or drainage from the incisions, or any other worrisome concerns. If physician on call was not reached or could not communicate with our office for any reason please go to the nearest emergency department     TONI Keller,

## 2025-01-16 ENCOUNTER — OFFICE VISIT (OUTPATIENT)
Dept: PAIN MEDICINE | Facility: CLINIC | Age: 50
End: 2025-01-16
Payer: MEDICARE

## 2025-01-16 VITALS
RESPIRATION RATE: 12 BRPM | HEART RATE: 71 BPM | BODY MASS INDEX: 33.31 KG/M2 | DIASTOLIC BLOOD PRESSURE: 87 MMHG | SYSTOLIC BLOOD PRESSURE: 134 MMHG | OXYGEN SATURATION: 100 % | HEIGHT: 66 IN | WEIGHT: 207.25 LBS

## 2025-01-16 DIAGNOSIS — M51.362 DEGENERATION OF INTERVERTEBRAL DISC OF LUMBAR REGION WITH DISCOGENIC BACK PAIN AND LOWER EXTREMITY PAIN: ICD-10-CM

## 2025-01-16 DIAGNOSIS — M54.17 LUMBOSACRAL RADICULOPATHY: ICD-10-CM

## 2025-01-16 DIAGNOSIS — M47.812 CERVICAL SPONDYLOSIS: ICD-10-CM

## 2025-01-16 DIAGNOSIS — M54.12 CERVICAL RADICULOPATHY: Primary | ICD-10-CM

## 2025-01-16 DIAGNOSIS — M79.18 MYOFASCIAL PAIN: ICD-10-CM

## 2025-01-16 DIAGNOSIS — M54.16 LUMBAR RADICULOPATHY: ICD-10-CM

## 2025-01-16 PROCEDURE — 99214 OFFICE O/P EST MOD 30 MIN: CPT | Mod: S$GLB,,, | Performed by: NURSE PRACTITIONER

## 2025-01-16 PROCEDURE — 3075F SYST BP GE 130 - 139MM HG: CPT | Mod: CPTII,S$GLB,, | Performed by: NURSE PRACTITIONER

## 2025-01-16 PROCEDURE — 1159F MED LIST DOCD IN RCRD: CPT | Mod: CPTII,S$GLB,, | Performed by: NURSE PRACTITIONER

## 2025-01-16 PROCEDURE — 1160F RVW MEDS BY RX/DR IN RCRD: CPT | Mod: CPTII,S$GLB,, | Performed by: NURSE PRACTITIONER

## 2025-01-16 PROCEDURE — 3079F DIAST BP 80-89 MM HG: CPT | Mod: CPTII,S$GLB,, | Performed by: NURSE PRACTITIONER

## 2025-01-16 PROCEDURE — 99999 PR PBB SHADOW E&M-EST. PATIENT-LVL IV: CPT | Mod: PBBFAC,,, | Performed by: NURSE PRACTITIONER

## 2025-01-16 PROCEDURE — 3008F BODY MASS INDEX DOCD: CPT | Mod: CPTII,S$GLB,, | Performed by: NURSE PRACTITIONER

## 2025-01-16 RX ORDER — TIZANIDINE 4 MG/1
4 TABLET ORAL 3 TIMES DAILY PRN
Qty: 90 TABLET | Refills: 0 | Status: SHIPPED | OUTPATIENT
Start: 2025-01-16 | End: 2025-02-15
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
## 2025-01-17 ENCOUNTER — PATIENT MESSAGE (OUTPATIENT)
Dept: PAIN MEDICINE | Facility: OTHER | Age: 50
End: 2025-01-17
Payer: MEDICARE

## 2025-01-17 DIAGNOSIS — M54.12 CERVICAL RADICULOPATHY: Primary | ICD-10-CM

## 2025-01-20 ENCOUNTER — PATIENT MESSAGE (OUTPATIENT)
Dept: PAIN MEDICINE | Facility: OTHER | Age: 50
End: 2025-01-20
Payer: MEDICARE

## 2025-02-06 ENCOUNTER — HOSPITAL ENCOUNTER (OUTPATIENT)
Facility: OTHER | Age: 50
Discharge: HOME | End: 2025-02-06
Attending: STUDENT IN AN ORGANIZED HEALTH CARE EDUCATION/TRAINING PROGRAM | Admitting: STUDENT IN AN ORGANIZED HEALTH CARE EDUCATION/TRAINING PROGRAM
Payer: MEDICARE

## 2025-02-06 VITALS
HEART RATE: 56 BPM | TEMPERATURE: 98 F | SYSTOLIC BLOOD PRESSURE: 160 MMHG | RESPIRATION RATE: 16 BRPM | DIASTOLIC BLOOD PRESSURE: 97 MMHG | BODY MASS INDEX: 33.8 KG/M2 | HEIGHT: 64 IN | WEIGHT: 198 LBS | OXYGEN SATURATION: 100 %

## 2025-02-06 DIAGNOSIS — G89.29 CHRONIC PAIN: ICD-10-CM

## 2025-02-06 DIAGNOSIS — M54.12 CERVICAL RADICULOPATHY: Primary | ICD-10-CM

## 2025-02-06 LAB — POCT GLUCOSE: 87 MG/DL (ref 70–110)

## 2025-02-06 PROCEDURE — 64480 NJX AA&/STRD TFRM EPI C/T EA: CPT | Mod: RT | Performed by: STUDENT IN AN ORGANIZED HEALTH CARE EDUCATION/TRAINING PROGRAM

## 2025-02-06 PROCEDURE — 99152 MOD SED SAME PHYS/QHP 5/>YRS: CPT | Mod: ,,, | Performed by: STUDENT IN AN ORGANIZED HEALTH CARE EDUCATION/TRAINING PROGRAM

## 2025-02-06 PROCEDURE — 25500020 PHARM REV CODE 255: Performed by: STUDENT IN AN ORGANIZED HEALTH CARE EDUCATION/TRAINING PROGRAM

## 2025-02-06 PROCEDURE — 64479 NJX AA&/STRD TFRM EPI C/T 1: CPT | Mod: RT,,, | Performed by: STUDENT IN AN ORGANIZED HEALTH CARE EDUCATION/TRAINING PROGRAM

## 2025-02-06 PROCEDURE — 64480 NJX AA&/STRD TFRM EPI C/T EA: CPT | Mod: RT,,, | Performed by: STUDENT IN AN ORGANIZED HEALTH CARE EDUCATION/TRAINING PROGRAM

## 2025-02-06 PROCEDURE — 99152 MOD SED SAME PHYS/QHP 5/>YRS: CPT | Performed by: STUDENT IN AN ORGANIZED HEALTH CARE EDUCATION/TRAINING PROGRAM

## 2025-02-06 PROCEDURE — 82962 GLUCOSE BLOOD TEST: CPT | Performed by: STUDENT IN AN ORGANIZED HEALTH CARE EDUCATION/TRAINING PROGRAM

## 2025-02-06 PROCEDURE — 64479 NJX AA&/STRD TFRM EPI C/T 1: CPT | Mod: RT | Performed by: STUDENT IN AN ORGANIZED HEALTH CARE EDUCATION/TRAINING PROGRAM

## 2025-02-06 PROCEDURE — 63600175 PHARM REV CODE 636 W HCPCS: Performed by: STUDENT IN AN ORGANIZED HEALTH CARE EDUCATION/TRAINING PROGRAM

## 2025-02-06 RX ORDER — LIDOCAINE HYDROCHLORIDE 10 MG/ML
INJECTION, SOLUTION EPIDURAL; INFILTRATION; INTRACAUDAL; PERINEURAL
Status: DISCONTINUED | OUTPATIENT
Start: 2025-02-06 | End: 2025-02-06 | Stop reason: HOSPADM

## 2025-02-06 RX ORDER — MIDAZOLAM HYDROCHLORIDE 1 MG/ML
INJECTION INTRAMUSCULAR; INTRAVENOUS
Status: DISCONTINUED | OUTPATIENT
Start: 2025-02-06 | End: 2025-02-06 | Stop reason: HOSPADM

## 2025-02-06 RX ORDER — DEXAMETHASONE SODIUM PHOSPHATE 10 MG/ML
INJECTION, SOLUTION INTRA-ARTICULAR; INTRALESIONAL; INTRAMUSCULAR; INTRAVENOUS; SOFT TISSUE
Status: DISCONTINUED | OUTPATIENT
Start: 2025-02-06 | End: 2025-02-06 | Stop reason: HOSPADM

## 2025-02-06 RX ORDER — SODIUM CHLORIDE 9 MG/ML
INJECTION, SOLUTION INTRAVENOUS CONTINUOUS
Status: DISCONTINUED | OUTPATIENT
Start: 2025-02-06 | End: 2025-02-06 | Stop reason: HOSPADM

## 2025-02-06 RX ORDER — LIDOCAINE HYDROCHLORIDE 20 MG/ML
INJECTION, SOLUTION INFILTRATION; PERINEURAL
Status: DISCONTINUED | OUTPATIENT
Start: 2025-02-06 | End: 2025-02-06 | Stop reason: HOSPADM

## 2025-02-06 RX ORDER — FENTANYL CITRATE 50 UG/ML
INJECTION, SOLUTION INTRAMUSCULAR; INTRAVENOUS
Status: DISCONTINUED | OUTPATIENT
Start: 2025-02-06 | End: 2025-02-06 | Stop reason: HOSPADM

## 2025-02-06 NOTE — DISCHARGE INSTRUCTIONS

## 2025-02-06 NOTE — DISCHARGE SUMMARY
Discharge Note  Short Stay      SUMMARY     Admit Date: 2/6/2025    Attending Physician: Sherman Rahman MD    Discharge Physician: Sherman Rahman MD      Discharge Date: 2/6/2025 8:32 AM    Procedure(s) (LRB):  CERVICAL TRANSFORAMINAL RIGHT C6/7 AND C7/T1 *ALE PT* (Right)    Final Diagnosis: Cervical radiculopathy [M54.12]    Disposition: Home or self care    Patient Instructions:   Current Discharge Medication List        CONTINUE these medications which have NOT CHANGED    Details   amitriptyline (ELAVIL) 50 MG tablet Take 50 mg by mouth nightly.      amlodipine (NORVASC) 10 MG tablet Take 10 mg by mouth once daily.      atorvastatin (LIPITOR) 40 MG tablet Take 40 mg by mouth once daily.      DULoxetine 40 mg CpDR Take 40 mg by mouth once daily.  Qty: 90 capsule, Refills: 0    Associated Diagnoses: Chronic pain of right lower extremity; Moderate episode of recurrent major depressive disorder      hydrOXYzine HCL (ATARAX) 50 MG tablet Take 1 tablet (50 mg total) by mouth every 8 (eight) hours as needed for Anxiety (Insomnia/panic attacks).  Qty: 40 each, Refills: 0    Associated Diagnoses: PTSD (post-traumatic stress disorder)      ketotifen (ZADITOR) 0.025 % (0.035 %) ophthalmic solution INSTILL ONE DROP IN AFFECTED EYE ONCE A DAY AS NEEDED FOR ITCHY EYES      LIDOcaine (LIDODERM) 5 % Place 1 patch onto the skin once daily.  Qty: 5 patch, Refills: 0      losartan (COZAAR) 25 MG tablet Take 25 mg by mouth once daily.      metFORMIN (GLUCOPHAGE) 1000 MG tablet Take 1,000 mg by mouth daily with breakfast.      omeprazole (PRILOSEC) 20 MG capsule Take 1 capsule by mouth once daily.      ONETOUCH DELICA LANCETS 33 gauge Misc TEST ONCE D  Refills: 3      ONETOUCH ULTRA BLUE TEST STRIP Strp TEST ONCE D BEFORE BREAKFAST  Refills: 3      ONETOUCH ULTRAMINI kit UTD  Refills: 0      pregabalin (LYRICA) 100 MG capsule Take 1 capsule (100 mg total) by mouth 3 (three) times daily.  Qty: 90 capsule, Refills: 2    Associated  Diagnoses: Degeneration of intervertebral disc of lumbar region with discogenic back pain and lower extremity pain; Lumbosacral radiculopathy      tiZANidine (ZANAFLEX) 4 MG tablet Take 1 tablet (4 mg total) by mouth 3 (three) times daily as needed (muscle pain).  Qty: 90 tablet, Refills: 0    Associated Diagnoses: Myofascial pain      tobramycin sulfate 0.3% (TOBREX) 0.3 % ophthalmic solution 1-2 drops topically twice daily to affected toe(s).  Qty: 5 mL, Refills: 3                 Discharge Diagnosis: Cervical radiculopathy [M54.12]  Condition on Discharge: Stable with no complications to procedure   Diet on Discharge: Same as before.  Activity: as per instruction sheet.  Discharge to: Home with a responsible adult.  Follow up: 2-4 weeks       Please call my office or pager at [phone] if experienced any weakness or loss of sensation, fever > 101.5, pain uncontrolled with oral medications, persistent nausea/vomiting/or diarrhea, redness or drainage from the incisions, or any other worrisome concerns. If physician on call was not reached or could not communicate with our office for any reason please go to the nearest emergency department     Agustin Dumont MD

## 2025-02-06 NOTE — DISCHARGE SUMMARY
Discharge Note  Short Stay      SUMMARY     Admit Date: 2/6/2025    Attending Physician: Sherman Rahman MD PhD    Discharge Physician: Sherman Rahman      Discharge Date: 2/6/2025 9:51 AM    Procedure(s) (LRB):  CERVICAL TRANSFORAMINAL RIGHT C6/7 AND C7/T1 *ALE PT* (Right)    Final Diagnosis: Cervical radiculopathy [M54.12]    Disposition: Home or self care    Patient Instructions:   Discharge Medication List as of 2/6/2025  8:37 AM        CONTINUE these medications which have NOT CHANGED    Details   amitriptyline (ELAVIL) 50 MG tablet Take 50 mg by mouth nightly., Starting Mon 1/10/2022, Historical Med      amlodipine (NORVASC) 10 MG tablet Take 10 mg by mouth once daily., Historical Med      atorvastatin (LIPITOR) 40 MG tablet Take 40 mg by mouth once daily., Starting Thu 5/14/2020, Historical Med      DULoxetine 40 mg CpDR Take 40 mg by mouth once daily., Starting Mon 12/2/2024, Until Tue 12/2/2025, Normal      hydrOXYzine HCL (ATARAX) 50 MG tablet Take 1 tablet (50 mg total) by mouth every 8 (eight) hours as needed for Anxiety (Insomnia/panic attacks)., Starting Mon 12/2/2024, Normal      ketotifen (ZADITOR) 0.025 % (0.035 %) ophthalmic solution INSTILL ONE DROP IN AFFECTED EYE ONCE A DAY AS NEEDED FOR ITCHY EYES, Historical Med      LIDOcaine (LIDODERM) 5 % Place 1 patch onto the skin once daily., Starting Fri 12/13/2024, Normal      losartan (COZAAR) 25 MG tablet Take 25 mg by mouth once daily., Starting Mon 1/10/2022, Historical Med      metFORMIN (GLUCOPHAGE) 1000 MG tablet Take 1,000 mg by mouth daily with breakfast., Starting Wed 1/30/2019, Historical Med      omeprazole (PRILOSEC) 20 MG capsule Take 1 capsule by mouth once daily., Starting Thu 11/16/2023, Historical Med      ONETOUCH DELICA LANCETS 33 gauge Misc TEST ONCE D, Historical Med      ONETOUCH ULTRA BLUE TEST STRIP Strp TEST ONCE D BEFORE BREAKFAST, Historical Med      ONETOUCH ULTRAMINI kit UTD, Historical Med      pregabalin (LYRICA) 100 MG  capsule Take 1 capsule (100 mg total) by mouth 3 (three) times daily., Starting Thu 12/12/2024, Normal      tiZANidine (ZANAFLEX) 4 MG tablet Take 1 tablet (4 mg total) by mouth 3 (three) times daily as needed (muscle pain)., Starting Thu 1/16/2025, Until Sat 2/15/2025 at 2359, Normal      tobramycin sulfate 0.3% (TOBREX) 0.3 % ophthalmic solution 1-2 drops topically twice daily to affected toe(s)., Normal                 Discharge Diagnosis: Cervical radiculopathy [M54.12]  Condition on Discharge: Stable with no complications to procedure   Diet on Discharge: Same as before.  Activity: as per instruction sheet.  Discharge to: Home with a responsible adult.  Follow up: 2-4 weeks       Please call my office or pager at [phone] if experienced any weakness or loss of sensation, fever > 101.5, pain uncontrolled with oral medications, persistent nausea/vomiting/or diarrhea, redness or drainage from the incisions, or any other worrisome concerns. If physician on call was not reached or could not communicate with our office for any reason please go to the nearest emergency department      Sherman Rahman MD PhD

## 2025-02-06 NOTE — OP NOTE
Cervical Transforaminal Epidural Steroid Injection under Fluoroscopic Guidance    The procedure, risks, benefits, and options were discussed with the patient. There are no contraindications to the procedure. The patent expressed understanding and agreed to the procedure. Informed written consent was obtained prior to the start of the procedure and can be found in the patient's chart.    PATIENT NAME: Juan Carlos Turner   MRN: [id number]     DATE OF PROCEDURE: 02/06/2025    PROCEDURE: Right  C6/7 and C7/T1 Cervical Transforaminal Epidural Steroid Injection under Fluoroscopic Guidance    PRE-OP DIAGNOSIS: Cervical radiculopathy [M54.12] Cervical radiculopathy [M54.12]    POST-OP DIAGNOSIS: Same    PHYSICIAN: Sherman Rahman MD    ASSISTANTS: Agustin Dumont MD  [facility] pain fellow     MEDICATIONS INJECTED: Preservative-free Decadron 10mg with 1cc of Lidocaine 1% MPF     LOCAL ANESTHETIC INJECTED: Xylocaine 2%     SEDATION: Versed 2mg and Fentanyl 50mcg                                                                                                                                                                                     Conscious sedation ordered by M.D. Patient re-evaluation prior to administration of conscious sedation. No changes noted in patient's status from initial evaluation. The patient's vital signs were monitored by RN and patient remained hemodynamically stable throughout the procedure.    Event Time In   Sedation Start 0811   Sedation End 0830       ESTIMATED BLOOD LOSS: None    COMPLICATIONS: None    TECHNIQUE: Time-out was performed to identify the patient and procedure to be performed. With the patient laying in the oblique position, the surgical area was prepped and draped in the usual sterile fashion using ChloraPrep and a fenestrated drape. The levels were determined under fluoroscopy guidance. Skin anesthesia was achieved by injecting Lidocaine 2% over the injection sites. The transforaminal spaces  were then approached with a 26 gauge, 3.5 inch spinal quinke needle that was introduced under fluoroscopic guidance with AP, lateral and/or contralateral oblique imaging. Once the needle tip was in the area of the transforaminal space, and there was no blood, CSF or paraesthesias, contrast dye Omnipaque (300mg/mL) was injected to confirm placement and there was no vascular runoff. Fluoroscopic imaging in the AP and lateral views revealed a clear outline of the spinal nerve with proximal spread of agent through the neural foramen into the epidural space. Digital Subtraction was used to verify no significant vascular uptake. Then 1.5 mL of the medication mixture listed above was injected slowly at each site. Displacement of the radio opaque contrast after injection of the medication confirmed that the medication went into the area of the transforaminal spaces. The needles were removed and bleeding was nil. A sterile dressing was applied. No specimens collected. The patient tolerated the procedure well.     PRE-PROCEDURE PAIN SCORE: 10/10    POST-PROCEDURE PAIN SCORE: 6/10    The patient was monitored after the procedure in the recovery area. They were given post-procedure and discharge instructions to follow at home. The patient was discharged in a stable condition.    Agustin Dumont MD     I reviewed and edited the fellow's note. I conducted my own interview and physical examination. I agree with the findings. I was present and supervising all critical portions of the procedure.    Sherman Rahman MD PhD

## 2025-02-25 ENCOUNTER — TELEPHONE (OUTPATIENT)
Dept: PAIN MEDICINE | Facility: CLINIC | Age: 50
End: 2025-02-25
Payer: MEDICARE

## 2025-02-25 NOTE — TELEPHONE ENCOUNTER
----- Message from Noris sent at 2/25/2025  2:20 PM CST -----  Type: Patient CallWho Called: Patient Does the patient know what this is regarding? Pt is requesting a call back to reschedule her appt due to the roads being blocked off for parades. Please advise Does the patient rather a call back or a response via [facility]? callBest Call Back Number: [phone] Additional Information:

## 2025-03-06 ENCOUNTER — OFFICE VISIT (OUTPATIENT)
Dept: PAIN MEDICINE | Facility: CLINIC | Age: 50
End: 2025-03-06
Payer: MEDICARE

## 2025-03-06 VITALS
DIASTOLIC BLOOD PRESSURE: 99 MMHG | TEMPERATURE: 98 F | HEART RATE: 74 BPM | BODY MASS INDEX: 33.98 KG/M2 | WEIGHT: 198 LBS | SYSTOLIC BLOOD PRESSURE: 146 MMHG

## 2025-03-06 DIAGNOSIS — M51.362 DEGENERATION OF INTERVERTEBRAL DISC OF LUMBAR REGION WITH DISCOGENIC BACK PAIN AND LOWER EXTREMITY PAIN: ICD-10-CM

## 2025-03-06 DIAGNOSIS — M47.812 CERVICAL SPONDYLOSIS: ICD-10-CM

## 2025-03-06 DIAGNOSIS — M54.17 LUMBOSACRAL RADICULOPATHY: Primary | ICD-10-CM

## 2025-03-06 DIAGNOSIS — M79.18 MYOFASCIAL PAIN: ICD-10-CM

## 2025-03-06 DIAGNOSIS — Z87.828 HISTORY OF GUNSHOT WOUND: ICD-10-CM

## 2025-03-06 DIAGNOSIS — M54.12 CERVICAL RADICULOPATHY: ICD-10-CM

## 2025-03-06 PROCEDURE — 1159F MED LIST DOCD IN RCRD: CPT | Mod: CPTII,S$GLB,, | Performed by: NURSE PRACTITIONER

## 2025-03-06 PROCEDURE — 99214 OFFICE O/P EST MOD 30 MIN: CPT | Mod: S$GLB,,, | Performed by: NURSE PRACTITIONER

## 2025-03-06 PROCEDURE — 3077F SYST BP >= 140 MM HG: CPT | Mod: CPTII,S$GLB,, | Performed by: NURSE PRACTITIONER

## 2025-03-06 PROCEDURE — 99999 PR PBB SHADOW E&M-EST. PATIENT-LVL III: CPT | Mod: PBBFAC,,, | Performed by: NURSE PRACTITIONER

## 2025-03-06 PROCEDURE — 3008F BODY MASS INDEX DOCD: CPT | Mod: CPTII,S$GLB,, | Performed by: NURSE PRACTITIONER

## 2025-03-06 PROCEDURE — 3080F DIAST BP >= 90 MM HG: CPT | Mod: CPTII,S$GLB,, | Performed by: NURSE PRACTITIONER

## 2025-03-06 PROCEDURE — 1160F RVW MEDS BY RX/DR IN RCRD: CPT | Mod: CPTII,S$GLB,, | Performed by: NURSE PRACTITIONER

## 2025-03-06 RX ORDER — PREGABALIN 150 MG/1
150 CAPSULE ORAL 3 TIMES DAILY
Qty: 90 CAPSULE | Refills: 3 | Status: SHIPPED | OUTPATIENT
Start: 2025-03-06 | End: 2025-09-04

## 2025-03-06 RX ORDER — LIDOCAINE 50 MG/G
1 PATCH TOPICAL DAILY
Qty: 30 PATCH | Refills: 1 | Status: SHIPPED | OUTPATIENT
Start: 2025-03-06

## 2025-03-06 RX ORDER — TIZANIDINE 4 MG/1
4 TABLET ORAL 3 TIMES DAILY PRN
Qty: 90 TABLET | Refills: 1 | Status: SHIPPED | OUTPATIENT
Start: 2025-03-06 | End: 2025-05-05
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
## 2025-04-01 ENCOUNTER — OFFICE VISIT (OUTPATIENT)
Dept: INTERNAL MEDICINE | Facility: CLINIC | Age: 50
End: 2025-04-01
Payer: MEDICARE

## 2025-04-01 VITALS
DIASTOLIC BLOOD PRESSURE: 86 MMHG | SYSTOLIC BLOOD PRESSURE: 136 MMHG | HEART RATE: 63 BPM | OXYGEN SATURATION: 99 % | HEIGHT: 64 IN | WEIGHT: 214.06 LBS | BODY MASS INDEX: 36.55 KG/M2

## 2025-04-01 DIAGNOSIS — K21.9 GASTROESOPHAGEAL REFLUX DISEASE, UNSPECIFIED WHETHER ESOPHAGITIS PRESENT: Primary | ICD-10-CM

## 2025-04-01 PROCEDURE — 3075F SYST BP GE 130 - 139MM HG: CPT | Mod: CPTII,S$GLB,,

## 2025-04-01 PROCEDURE — 99999 PR PBB SHADOW E&M-EST. PATIENT-LVL IV: CPT | Mod: PBBFAC,,,

## 2025-04-01 PROCEDURE — 1160F RVW MEDS BY RX/DR IN RCRD: CPT | Mod: CPTII,S$GLB,,

## 2025-04-01 PROCEDURE — 1159F MED LIST DOCD IN RCRD: CPT | Mod: CPTII,S$GLB,,

## 2025-04-01 PROCEDURE — 3008F BODY MASS INDEX DOCD: CPT | Mod: CPTII,S$GLB,,

## 2025-04-01 PROCEDURE — 3079F DIAST BP 80-89 MM HG: CPT | Mod: CPTII,S$GLB,,

## 2025-04-01 PROCEDURE — 99213 OFFICE O/P EST LOW 20 MIN: CPT | Mod: S$GLB,,,

## 2025-04-01 RX ORDER — PANTOPRAZOLE SODIUM 40 MG/1
40 TABLET, DELAYED RELEASE ORAL DAILY
Qty: 90 TABLET | Refills: 3 | Status: SHIPPED | OUTPATIENT
Start: 2025-04-01 | End: 2026-03-27

## 2025-04-01 RX ORDER — CLONAZEPAM 0.5 MG/1
TABLET ORAL
COMMUNITY
Start: 2024-04-22

## 2025-04-01 NOTE — PROGRESS NOTES
"[facility]  Progress Note    SUBJECTIVE:     Chief Complaint:   Chief Complaint   Patient presents with    Gastroesophageal Reflux     Gotten worse; Was taking med 30 mins she eats but med didn't help and pt is now out of med       History of Present Illness:  49 y.o. female who  has a past medical history of Anxiety, Asthma, DDD (degenerative disc disease), cervical, Diabetes mellitus, Hypertension, and JOSUE on CPAP. presents to clinic today for evaluation of GERD symptoms    She reports daily acid reflux symptoms including chest tightening, burning sensation described as feeling "on fire," difficulty breathing, nausea, and difficulty swallowing. Symptoms occur even without food intake and are constant while sitting. Cold water provides temporary symptomatic relief. Omeprazole taken before meals is not providing adequate symptom control. Patient stating she has struggled with GERD and reflux with many years and would like GI evaluation as well.      Allergies:  Review of patient's allergies indicates:  No Known Allergies    Home Medications:  Current Outpatient Medications on File Prior to Visit   Medication Sig    amitriptyline (ELAVIL) 50 MG tablet Take 50 mg by mouth nightly.    amlodipine (NORVASC) 10 MG tablet Take 10 mg by mouth once daily.    atorvastatin (LIPITOR) 40 MG tablet Take 40 mg by mouth once daily.    clonazePAM (KLONOPIN) 0.5 MG tablet     DULoxetine 40 mg CpDR Take 40 mg by mouth once daily.    hydrOXYzine HCL (ATARAX) 50 MG tablet Take 1 tablet (50 mg total) by mouth every 8 (eight) hours as needed for Anxiety (Insomnia/panic attacks).    ketotifen (ZADITOR) 0.025 % (0.035 %) ophthalmic solution INSTILL ONE DROP IN AFFECTED EYE ONCE A DAY AS NEEDED FOR ITCHY EYES    LIDOcaine (LIDODERM) 5 % Place 1 patch onto the skin once daily.    losartan (COZAAR) 25 MG tablet Take 25 mg by mouth once daily.    metFORMIN (GLUCOPHAGE) 1000 MG tablet Take 1,000 mg by mouth daily with " breakfast.    ONETOUCH DELICA LANCETS 33 gauge Misc TEST ONCE D    ONETOUCH ULTRA BLUE TEST STRIP Strp TEST ONCE D BEFORE BREAKFAST    ONETOUCH ULTRAMINI kit UTD    pregabalin (LYRICA) 150 MG capsule Take 1 capsule (150 mg total) by mouth 3 (three) times daily.    tiZANidine (ZANAFLEX) 4 MG tablet Take 1 tablet (4 mg total) by mouth 3 (three) times daily as needed (muscle pain).    tobramycin sulfate 0.3% (TOBREX) 0.3 % ophthalmic solution 1-2 drops topically twice daily to affected toe(s).     No current facility-administered medications on file prior to visit.       Past Medical History:   Diagnosis Date    Anxiety     Asthma     DDD (degenerative disc disease), cervical     Diabetes mellitus     Hypertension     JOSUE on CPAP      Past Surgical History:   Procedure Laterality Date    ARTHROSCOPY OF KNEE Right 4/15/2021    Procedure: ARTHROSCOPY, KNEE LYSIS OF ADHESIONS;  Surgeon: Che Ruby MD;  Location: [facility] OR;  Service: Orthopedics;  Laterality: Right;    CHONDROPLASTY OF KNEE Right 4/15/2021    Procedure: CHONDROPLASTY, KNEE;  Surgeon: Che Ruby MD;  Location: [facility] OR;  Service: Orthopedics;  Laterality: Right;    EPIDURAL STEROID INJECTION N/A 10/23/2018    Procedure: Injection, Steroid, Epidural LUMBAR L4/5 TESS;  Surgeon: Ed Pina MD;  [facility];  Service: Pain Management;  Laterality: N/A;    HYSTERECTOMY      laparoscopic    KNEE ARTHROSCOPY W/ MENISCECTOMY Right 4/15/2021    Procedure: ARTHROSCOPY, KNEE, WITH MENISCECTOMY LATERAL, PLICA  EXCISION;  Surgeon: Che Ruby MD;  Location: [facility] OR;  Service: Orthopedics;  Laterality: Right;    LAPAROSCOPIC SLEEVE GASTRECTOMY N/A 5/13/2019    Procedure: GASTRECTOMY, SLEEVE, LAPAROSCOPIC;  Surgeon: Jie Montanez MD;  Location: [facility] OR;  Service: General;  Laterality: N/A;    right knee surgery      SYNOVECTOMY OF KNEE Right 4/15/2021    Procedure: SYNOVECTOMY, KNEE;  Surgeon: Che Ruby MD;  Location: [facility] OR;  Service:  Orthopedics;  Laterality: Right;    TRANSFORAMINAL EPIDURAL INJECTION OF STEROID Right 12/31/2024    Procedure: LUMBAR TRANSFORAMINAL RIGHT L3/4 AND L4/5;  Surgeon: Steff Thrasher MD;  [facility];  Service: Pain Management;  Laterality: Right;  2 WK F/U EMILY  Contact Information  [phone]    TRANSFORAMINAL EPIDURAL INJECTION OF STEROID Right 2/6/2025    Procedure: CERVICAL TRANSFORAMINAL RIGHT C6/7 AND C7/T1 *ALE PT*;  Surgeon: Sherman Rahman MD;  Location: [facility]IN [facility]GT;  Service: Pain Management;  Laterality: Right;  2 WK F/U SU     Family History   Problem Relation Name Age of Onset    Diabetes Father       Social History[1]       OBJECTIVE:     Vital Signs:  Pulse: 63 (04/01/25 1526)  BP: 136/86 (04/01/25 1526)  SpO2: 99 % (04/01/25 1526)    Physical Exam  Constitutional:       General: She is not in acute distress.     Appearance: Normal appearance. She is not toxic-appearing.   HENT:      Head: Normocephalic and atraumatic.      Right Ear: External ear normal.      Left Ear: External ear normal.      Nose: Nose normal.      Mouth/Throat:      Mouth: Mucous membranes are moist.   Eyes:      Extraocular Movements: Extraocular movements intact.   Cardiovascular:      Rate and Rhythm: Normal rate and regular rhythm.      Pulses: Normal pulses.      Heart sounds: Normal heart sounds.   Pulmonary:      Effort: Pulmonary effort is normal. No respiratory distress.   Abdominal:      General: Abdomen is flat.      Palpations: Abdomen is soft.      Tenderness: There is no abdominal tenderness.   Musculoskeletal:      Cervical back: Normal range of motion and neck supple.   Skin:     General: Skin is warm.      Findings: No bruising or erythema.   Neurological:      General: No focal deficit present.      Mental Status: She is alert.   Psychiatric:         Mood and Affect: Mood normal.         Laboratory:  Hemoglobin A1C   Date Value Ref Range Status   12/02/2024 6.2 (H) 4.0 - 5.6 % Final     Comment:      ADA Screening Guidelines:  5.7-6.4%  Consistent with prediabetes  >or=6.5%  Consistent with diabetes    High levels of fetal hemoglobin interfere with the HbA1C  assay. Heterozygous hemoglobin variants (HbS, HgC, etc)do  not significantly interfere with this assay.   However, presence of multiple variants may affect accuracy.     01/20/2022 6.1 (H) 4.0 - 5.6 % Final     Comment:     ADA Screening Guidelines:  5.7-6.4%  Consistent with prediabetes  >or=6.5%  Consistent with diabetes    High levels of fetal hemoglobin interfere with the HbA1C  assay. Heterozygous hemoglobin variants (HbS, HgC, etc)do  not significantly interfere with this assay.   However, presence of multiple variants may affect accuracy.     11/07/2019 6.4 (H) 4.0 - 5.6 % Final     Comment:     ADA Screening Guidelines:  5.7-6.4%  Consistent with prediabetes  >or=6.5%  Consistent with diabetes  High levels of fetal hemoglobin interfere with the HbA1C  assay. Heterozygous hemoglobin variants (HbS, HgC, etc)do  not significantly interfere with this assay.   However, presence of multiple variants may affect accuracy.         A/P:    Juan Carlos was seen today for gastroesophageal reflux.    Diagnoses and all orders for this visit:    Gastroesophageal reflux disease, unspecified whether esophagitis present  -     pantoprazole (PROTONIX) 40 MG tablet; Take 1 tablet (40 mg total) by mouth once daily.  -     Ambulatory referral/consult to Gastroenterology; Future    Presenting with worsening acid reflux symptoms for approximately 1 year, including chest tightness and significant burning sensation.  Previous treatment with low-dose Omeprazole (20 mg) ineffective, now discontinued, will trial protonix 40mg  Considered possibility of Workman's esophagus or other underlying conditions causing chronic acid reflux  Discussed possibility of esophageal polyps or strictures as potential causes of symptoms, will refer to [facility] to see it patient may need EGD or  other eval    Follow up in 1-2 months for follow up    This note was generated with the assistance of ambient listening technology. Verbal consent was obtained by the patient and accompanying visitor(s) for the recording of patient appointment to facilitate this note. I attest to having reviewed and edited the generated note for accuracy, though some syntax or spelling errors may persist. Please contact the author of this note for any clarification.      Josiah Valdes MD   Family Medicine   [facility]           [1]   Social History  Tobacco Use    Smoking status: Never    Smokeless tobacco: Never   Substance Use Topics    Alcohol use: Yes     Comment: socially    Drug use: No

## 2025-04-03 ENCOUNTER — TELEPHONE (OUTPATIENT)
Dept: PAIN MEDICINE | Facility: CLINIC | Age: 50
End: 2025-04-03
Payer: MEDICARE

## 2025-04-03 NOTE — TELEPHONE ENCOUNTER
Spoke with patient. Confirmed appointment location & time on 04/07/25  with Dr. Thrasher.  Patient expressed understanding & gratitude. Call ended.

## 2025-04-17 ENCOUNTER — OFFICE VISIT (OUTPATIENT)
Dept: PAIN MEDICINE | Facility: CLINIC | Age: 50
End: 2025-04-17
Payer: MEDICARE

## 2025-04-17 VITALS
SYSTOLIC BLOOD PRESSURE: 154 MMHG | TEMPERATURE: 98 F | HEART RATE: 73 BPM | DIASTOLIC BLOOD PRESSURE: 98 MMHG | OXYGEN SATURATION: 99 % | WEIGHT: 214.06 LBS | BODY MASS INDEX: 36.74 KG/M2

## 2025-04-17 DIAGNOSIS — M54.12 CERVICAL RADICULOPATHY: ICD-10-CM

## 2025-04-17 DIAGNOSIS — M54.17 LUMBOSACRAL RADICULOPATHY: ICD-10-CM

## 2025-04-17 DIAGNOSIS — S71.131S GUN SHOT WOUND OF THIGH/FEMUR, RIGHT, SEQUELA: ICD-10-CM

## 2025-04-17 DIAGNOSIS — R52 PERSISTENT WOUND PAIN: Primary | ICD-10-CM

## 2025-04-17 PROCEDURE — 99999 PR PBB SHADOW E&M-EST. PATIENT-LVL III: CPT | Mod: PBBFAC,,, | Performed by: ANESTHESIOLOGY

## 2025-04-17 RX ORDER — NORTRIPTYLINE HYDROCHLORIDE 25 MG/1
25 CAPSULE ORAL NIGHTLY
Qty: 30 CAPSULE | Refills: 6 | Status: SHIPPED | OUTPATIENT
Start: 2025-04-17
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
## 2025-05-30 NOTE — OR NURSING
Instructed patient to report to [facility] for 6 am on 6/6. Reviewed current medications and allergies to best of patient knowledge. No questions regarding prep for Egd. Ride to accompany patient morning of procedure.

## 2025-06-05 ENCOUNTER — ANESTHESIA EVENT (OUTPATIENT)
Dept: ENDOSCOPY | Facility: OTHER | Age: 50
End: 2025-06-05
Payer: MEDICARE

## 2025-06-06 ENCOUNTER — ANESTHESIA (OUTPATIENT)
Dept: ENDOSCOPY | Facility: OTHER | Age: 50
End: 2025-06-06
Payer: MEDICARE

## 2025-06-06 ENCOUNTER — HOSPITAL ENCOUNTER (OUTPATIENT)
Facility: OTHER | Age: 50
Discharge: HOME | End: 2025-06-06
Attending: INTERNAL MEDICINE | Admitting: INTERNAL MEDICINE
Payer: MEDICARE

## 2025-06-06 VITALS
WEIGHT: 214 LBS | OXYGEN SATURATION: 100 % | TEMPERATURE: 98 F | BODY MASS INDEX: 36.54 KG/M2 | SYSTOLIC BLOOD PRESSURE: 152 MMHG | RESPIRATION RATE: 16 BRPM | HEART RATE: 60 BPM | DIASTOLIC BLOOD PRESSURE: 94 MMHG | HEIGHT: 64 IN

## 2025-06-06 DIAGNOSIS — Z13.9 SCREENING DUE: ICD-10-CM

## 2025-06-06 DIAGNOSIS — K21.00 GASTROESOPHAGEAL REFLUX DISEASE WITH ESOPHAGITIS WITHOUT HEMORRHAGE: Primary | ICD-10-CM

## 2025-06-06 LAB — POCT GLUCOSE: 107 MG/DL (ref 70–110)

## 2025-06-06 PROCEDURE — 63600175 PHARM REV CODE 636 W HCPCS: Performed by: ANESTHESIOLOGY

## 2025-06-06 PROCEDURE — C1769 GUIDE WIRE: HCPCS | Performed by: INTERNAL MEDICINE

## 2025-06-06 PROCEDURE — 37000009 HC ANESTHESIA EA ADD 15 MINS: Performed by: INTERNAL MEDICINE

## 2025-06-06 PROCEDURE — 63600175 PHARM REV CODE 636 W HCPCS: Performed by: NURSE ANESTHETIST, CERTIFIED REGISTERED

## 2025-06-06 PROCEDURE — 43248 EGD GUIDE WIRE INSERTION: CPT | Performed by: INTERNAL MEDICINE

## 2025-06-06 PROCEDURE — 37000008 HC ANESTHESIA 1ST 15 MINUTES: Performed by: INTERNAL MEDICINE

## 2025-06-06 RX ORDER — GLUCAGON 1 MG
1 KIT INJECTION
Status: DISCONTINUED | OUTPATIENT
Start: 2025-06-06 | End: 2025-06-06 | Stop reason: HOSPADM

## 2025-06-06 RX ORDER — FENTANYL CITRATE 50 UG/ML
INJECTION, SOLUTION INTRAMUSCULAR; INTRAVENOUS
Status: DISCONTINUED | OUTPATIENT
Start: 2025-06-06 | End: 2025-06-06

## 2025-06-06 RX ORDER — LIDOCAINE HYDROCHLORIDE 20 MG/ML
INJECTION INTRAVENOUS
Status: DISCONTINUED | OUTPATIENT
Start: 2025-06-06 | End: 2025-06-06

## 2025-06-06 RX ORDER — HYDROMORPHONE HYDROCHLORIDE 2 MG/ML
0.4 INJECTION, SOLUTION INTRAMUSCULAR; INTRAVENOUS; SUBCUTANEOUS EVERY 5 MIN PRN
Status: DISCONTINUED | OUTPATIENT
Start: 2025-06-06 | End: 2025-06-06 | Stop reason: HOSPADM

## 2025-06-06 RX ORDER — OXYCODONE HYDROCHLORIDE 5 MG/1
5 TABLET ORAL
Status: DISCONTINUED | OUTPATIENT
Start: 2025-06-06 | End: 2025-06-06 | Stop reason: HOSPADM

## 2025-06-06 RX ORDER — PROCHLORPERAZINE EDISYLATE 5 MG/ML
5 INJECTION INTRAMUSCULAR; INTRAVENOUS EVERY 30 MIN PRN
Status: DISCONTINUED | OUTPATIENT
Start: 2025-06-06 | End: 2025-06-06 | Stop reason: HOSPADM

## 2025-06-06 RX ORDER — PROPOFOL 10 MG/ML
VIAL (ML) INTRAVENOUS
Status: DISCONTINUED | OUTPATIENT
Start: 2025-06-06 | End: 2025-06-06

## 2025-06-06 RX ORDER — SODIUM CHLORIDE 0.9 % (FLUSH) 0.9 %
3 SYRINGE (ML) INJECTION
Status: DISCONTINUED | OUTPATIENT
Start: 2025-06-06 | End: 2025-06-06 | Stop reason: HOSPADM

## 2025-06-06 RX ADMIN — PROPOFOL 30 MG: 10 INJECTION, EMULSION INTRAVENOUS at 07:06

## 2025-06-06 RX ADMIN — LIDOCAINE HYDROCHLORIDE 50 MG: 20 INJECTION, SOLUTION INTRAVENOUS at 07:06

## 2025-06-06 RX ADMIN — FENTANYL CITRATE 100 MCG: 50 INJECTION, SOLUTION INTRAMUSCULAR; INTRAVENOUS at 07:06

## 2025-06-06 RX ADMIN — PROPOFOL 100 MG: 10 INJECTION, EMULSION INTRAVENOUS at 07:06

## 2025-06-06 RX ADMIN — SODIUM CHLORIDE, SODIUM LACTATE, POTASSIUM CHLORIDE, AND CALCIUM CHLORIDE: .6; .31; .03; .02 INJECTION, SOLUTION INTRAVENOUS at 06:06

## 2025-06-06 RX ADMIN — PROPOFOL 80 MG: 10 INJECTION, EMULSION INTRAVENOUS at 07:06

## 2025-06-06 NOTE — [employer] PATIENT INSTRUCTIONS
Discharge Summary/Instructions after an Endoscopic Procedure  Patient Name: Juan Carlos Turner  Patient MRN: [id number]  Patient YOB: 1975  Friday, June 6, 2025  Sera Duarte MD  RESTRICTIONS:  During your procedure today, you received medications for sedation.  These   medications may affect your judgment, balance and coordination.  Therefore,   for 24 hours, you have the following restrictions:   - DO NOT drive a car, operate machinery, make legal/financial decisions,   sign important papers or drink alcohol.    ACTIVITY:  Today: no heavy lifting, straining or running due to procedural   sedation/anesthesia.  The following day: return to full activity including work.  DIET:  Eat and drink normally unless instructed otherwise.     TREATMENT FOR COMMON SIDE EFFECTS:  - Mild abdominal pain, nausea, belching, bloating or excessive gas:  rest,   eat lightly and use a heating pad.  - Sore Throat: treat with throat lozenges and/or gargle with warm salt   water.  - Because air was used during the procedure, expelling large amounts of air   from your rectum or belching is normal.  - If a bowel prep was taken, you may not have a bowel movement for 1-3 days.    This is normal.  SYMPTOMS TO WATCH FOR AND REPORT TO YOUR PHYSICIAN:  1. Abdominal pain or bloating, other than gas cramps.  2. Chest pain.  3. Back pain.  4. Signs of infection such as: chills or fever occurring within 24 hours   after the procedure.  5. Rectal bleeding, which would show as bright red, maroon, or black stools.   (A tablespoon of blood from the rectum is not serious, especially if   hemorrhoids are present.)  6. Vomiting.  7. Weakness or dizziness.  GO DIRECTLY TO THE NEAREST EMERGENCY ROOM IF YOU HAVE ANY OF THE FOLLOWING:      Difficulty breathing              Chills and/or fever over 101 F   Persistent vomiting and/or vomiting blood   Severe abdominal pain   Severe chest pain   Black, tarry stools   Bleeding- more than one tablespoon   Any  other symptom or condition that you feel may need urgent attention  Your doctor recommends these additional instructions:  If any biopsies were taken, your doctors clinic will contact you in 1 to 2   weeks with any results.  - Patient has a contact number available for emergencies.  The signs and   symptoms of potential delayed complications were discussed with the   patient.  Return to normal activities tomorrow.  Written discharge   instructions were provided to the patient.   - Discharge patient to home.   - Resume previous diet today.   - Follow an antireflux regimen.   - Continue present medications.   - Use Protonix (pantoprazole) 40 mg PO daily indefinitely.   - Return to GI clinic PRN.   - The findings and recommendations were discussed with the patient.  For questions, problems or results please call your physician - Sera Duarte MD at Work:  [phone].  [facility], EMERGENCY ROOM PHONE NUMBER: [phone], [facility]   [phone].  IF A COMPLICATION OR EMERGENCY SITUATION ARISES AND YOU ARE UNABLE TO REACH   YOUR PHYSICIAN - GO DIRECTLY TO THE EMERGENCY ROOM.  Sera Duarte MD  6/6/2025 7:34:48 AM  This report has been verified and signed electronically.  Dear patient,  As a result of recent federal legislation (The Federal Cures Act), you may   receive lab or pathology results from your procedure in your [facility]   account before your physician is able to contact you. Your physician or   their representative will relay the results to you with their   recommendations at their soonest availability.  Thank you,  [employer]

## 2025-06-06 NOTE — OR NURSING
States she cannot wait any longer to speak with dr. Duarte, as her ride needs to go to work. Dr. Duarte notified and will call pt's cell.

## 2025-06-06 NOTE — PLAN OF CARE
Juan Carlos Turner has met all discharge criteria from Phase II. Vital Signs are stable, ambulating  without difficulty. Discharge instructions given, patient verbalized understanding. Discharged from facility via wheelchair in stable condition.

## 2025-06-06 NOTE — H&P
Endoscopy Pre-Procedure H&P    Reason for Procedure: dysphagia, GERD    HPI:  Pt is a 49 y.o. female who presents for EGD to evaluate dysphagia, GERD.  No other GI symptoms.    Past Medical History:   Diagnosis Date    Anxiety     Asthma     DDD (degenerative disc disease), cervical     Diabetes mellitus     Hypertension     JOSUE on CPAP        Past Surgical History:   Procedure Laterality Date    ARTHROSCOPY OF KNEE Right 4/15/2021    Procedure: ARTHROSCOPY, KNEE LYSIS OF ADHESIONS;  Surgeon: Che Ruby MD;  Location: [facility] OR;  Service: Orthopedics;  Laterality: Right;    CHONDROPLASTY OF KNEE Right 4/15/2021    Procedure: CHONDROPLASTY, KNEE;  Surgeon: Che Ruby MD;  Location: [facility] OR;  Service: Orthopedics;  Laterality: Right;    EPIDURAL STEROID INJECTION N/A 10/23/2018    Procedure: Injection, Steroid, Epidural LUMBAR L4/5 TESS;  Surgeon: Ed Pina MD;  Location: [facility] PAIN MGT;  Service: Pain Management;  Laterality: N/A;    HYSTERECTOMY      laparoscopic    KNEE ARTHROSCOPY W/ MENISCECTOMY Right 4/15/2021    Procedure: ARTHROSCOPY, KNEE, WITH MENISCECTOMY LATERAL, PLICA  EXCISION;  Surgeon: Che Ruby MD;  Location: [facility] OR;  Service: Orthopedics;  Laterality: Right;    LAPAROSCOPIC SLEEVE GASTRECTOMY N/A 5/13/2019    Procedure: GASTRECTOMY, SLEEVE, LAPAROSCOPIC;  Surgeon: Jie Montanez MD;  Location: [facility] OR;  Service: General;  Laterality: N/A;    right knee surgery      SYNOVECTOMY OF KNEE Right 4/15/2021    Procedure: SYNOVECTOMY, KNEE;  Surgeon: Che Ruby MD;  Location: [facility] OR;  Service: Orthopedics;  Laterality: Right;    TRANSFORAMINAL EPIDURAL INJECTION OF STEROID Right 12/31/2024    Procedure: LUMBAR TRANSFORAMINAL RIGHT L3/4 AND L4/5;  Surgeon: Steff Thrasher MD;  Location: [facility] PAIN MGT;  Service: Pain Management;  Laterality: Right;  2 WK F/U EMILY  Contact Information  [phone]    TRANSFORAMINAL EPIDURAL INJECTION OF STEROID Right 2/6/2025    Procedure: CERVICAL  "TRANSFORAMINAL RIGHT C6/7 AND C7/T1 *ALE PT*;  Surgeon: Sherman Rahman MD;  Location: [facility];  Service: Pain Management;  Laterality: Right;  2 WK F/U SU       Family History   Problem Relation Name Age of Onset    Diabetes Father         Social History[1]    Review of patient's allergies indicates:  No Known Allergies    Medications Ordered Prior to Encounter[2]    ROS: Negative x 10    Patient Vitals for the past 24 hrs:   BP Temp Temp src Pulse Resp SpO2 Height Weight   06/06/25 0605 (!) 138/97 -- -- 64 -- -- -- --   06/06/25 0604 (!) 138/97 98.1 °F (36.7 °C) Oral 64 18 96 % 5' 4" (1.626 m) 97.1 kg (214 lb)     Gen: Well developed, well nourished, no apparent distress  HEENT: Anicteric, PERRL, MMM  CV: Regular rate and rhythm, no murmurs   Lungs: CTAB, no increased work of breathing  Abd: Soft, NT, ND, normal BS, no HSM  Ext: No C/C/E  Neuro: Alert and oriented to person, place, time; no weakness  Skin: No rashes/lesions  Psych: Normal mood and affect    Assessment:  Juan Carlos Turner is a 49 y.o. female who presents for EGD to evaluate dysphagia and GERD.    Recommendations:  1. EGD  2. F/U with PCP     Sera Duarte MD         [1]   Social History  Tobacco Use    Smoking status: Never    Smokeless tobacco: Never   Substance Use Topics    Alcohol use: Yes     Comment: socially    Drug use: No   [2]   No current facility-administered medications on file prior to encounter.     Current Outpatient Medications on File Prior to Encounter   Medication Sig Dispense Refill    amlodipine (NORVASC) 10 MG tablet Take 10 mg by mouth once daily.      atorvastatin (LIPITOR) 40 MG tablet Take 40 mg by mouth once daily.      clonazePAM (KLONOPIN) 0.5 MG tablet       DULoxetine 40 mg CpDR Take 40 mg by mouth once daily. 90 capsule 0    hydrOXYzine HCL (ATARAX) 50 MG tablet Take 1 tablet (50 mg total) by mouth every 8 (eight) hours as needed for Anxiety (Insomnia/panic attacks). 40 each 0    ketotifen (ZADITOR) 0.025 % " (0.035 %) ophthalmic solution INSTILL ONE DROP IN AFFECTED EYE ONCE A DAY AS NEEDED FOR ITCHY EYES      LIDOcaine (LIDODERM) 5 % Place 1 patch onto the skin once daily. 30 patch 1    losartan (COZAAR) 25 MG tablet Take 25 mg by mouth once daily.      metFORMIN (GLUCOPHAGE) 1000 MG tablet Take 1,000 mg by mouth daily with breakfast.      nortriptyline (PAMELOR) 25 MG capsule Take 1 capsule (25 mg total) by mouth every evening. 30 capsule 6    ONETOUCH DELICA LANCETS 33 gauge Misc TEST ONCE D  3    ONETOUCH ULTRA BLUE TEST STRIP Strp TEST ONCE D BEFORE BREAKFAST  3    ONETOUCH ULTRAMINI kit UTD  0    pantoprazole (PROTONIX) 40 MG tablet Take 1 tablet (40 mg total) by mouth once daily. 90 tablet 3    pregabalin (LYRICA) 150 MG capsule Take 1 capsule (150 mg total) by mouth 3 (three) times daily. 90 capsule 3    tobramycin sulfate 0.3% (TOBREX) 0.3 % ophthalmic solution 1-2 drops topically twice daily to affected toe(s). 5 mL 3

## 2025-06-09 ENCOUNTER — OFFICE VISIT (OUTPATIENT)
Dept: INTERNAL MEDICINE | Facility: CLINIC | Age: 50
End: 2025-06-09
Payer: MEDICARE

## 2025-06-09 VITALS
HEIGHT: 66 IN | BODY MASS INDEX: 33.8 KG/M2 | WEIGHT: 210.31 LBS | HEART RATE: 77 BPM | DIASTOLIC BLOOD PRESSURE: 90 MMHG | OXYGEN SATURATION: 98 % | SYSTOLIC BLOOD PRESSURE: 130 MMHG

## 2025-06-09 DIAGNOSIS — F43.10 PTSD (POST-TRAUMATIC STRESS DISORDER): Primary | ICD-10-CM

## 2025-06-09 PROCEDURE — 99999 PR PBB SHADOW E&M-EST. PATIENT-LVL IV: CPT | Mod: PBBFAC,,,

## 2025-06-09 PROCEDURE — 1159F MED LIST DOCD IN RCRD: CPT | Mod: CPTII,S$GLB,,

## 2025-06-09 PROCEDURE — 1160F RVW MEDS BY RX/DR IN RCRD: CPT | Mod: CPTII,S$GLB,,

## 2025-06-09 PROCEDURE — 3008F BODY MASS INDEX DOCD: CPT | Mod: CPTII,S$GLB,,

## 2025-06-09 PROCEDURE — 3075F SYST BP GE 130 - 139MM HG: CPT | Mod: CPTII,S$GLB,,

## 2025-06-09 PROCEDURE — 99213 OFFICE O/P EST LOW 20 MIN: CPT | Mod: S$GLB,,,

## 2025-06-09 PROCEDURE — 3080F DIAST BP >= 90 MM HG: CPT | Mod: CPTII,S$GLB,,

## 2025-06-09 RX ORDER — ESOMEPRAZOLE STRONTIUM 49.3 MG/1
60 CAPSULE, DELAYED RELEASE ORAL
COMMUNITY
Start: 2025-05-12

## 2025-06-09 RX ORDER — CITALOPRAM 20 MG/1
TABLET ORAL
COMMUNITY
End: 2025-06-09 | Stop reason: SDUPTHER

## 2025-06-09 RX ORDER — ESOMEPRAZOLE MAGNESIUM 40 MG/1
40 CAPSULE, DELAYED RELEASE ORAL 2 TIMES DAILY
COMMUNITY
Start: 2025-05-22

## 2025-06-09 RX ORDER — CITALOPRAM 40 MG/1
40 TABLET ORAL DAILY
Qty: 90 TABLET | Refills: 3 | Status: SHIPPED | OUTPATIENT
Start: 2025-06-09

## 2025-06-09 RX ORDER — FERROUS SULFATE 325(65) MG
TABLET ORAL
COMMUNITY

## 2025-06-09 NOTE — PROGRESS NOTES
[facility]  Progress Note    SUBJECTIVE:     Chief Complaint:   Chief Complaint   Patient presents with    Depression     6m F/u       History of Present Illness:  49 y.o. female who  has a past medical history of Anxiety, Asthma, DDD (degenerative disc disease), cervical, Diabetes mellitus, Hypertension, and JOSUE on CPAP. presents to clinic today for follow up of MDD    #MDD  #MDD/PTSD/SIDRA  She experiences racing thoughts and daydreaming, particularly during periods of inactivity when thoughts and memories become more intrusive. She has previously worked with two different therapists, noting that while therapy can be helpful, it does not always alleviate her symptoms. She has trialed citalopram, escitalopram, and duloxetine in the past. Currently taking citalopram but continues to experience intrusive and racing thoughts despite medication. Denies SI/HI      Allergies:  Review of patient's allergies indicates:  No Known Allergies    Home Medications:  Current Outpatient Medications on File Prior to Visit   Medication Sig    amlodipine (NORVASC) 10 MG tablet Take 10 mg by mouth once daily.    atorvastatin (LIPITOR) 40 MG tablet Take 40 mg by mouth once daily.    clonazePAM (KLONOPIN) 0.5 MG tablet     esomeprazole (NEXIUM) 40 MG capsule Take 40 mg by mouth 2 (two) times daily.    esomeprazole strontium 49.3 mg CpDR 60 capsules.    ferrous sulfate (FEOSOL) 325 mg (65 mg iron) Tab tablet 15    hydrOXYzine HCL (ATARAX) 50 MG tablet Take 1 tablet (50 mg total) by mouth every 8 (eight) hours as needed for Anxiety (Insomnia/panic attacks).    LIDOcaine (LIDODERM) 5 % Place 1 patch onto the skin once daily.    losartan (COZAAR) 25 MG tablet Take 25 mg by mouth once daily.    metFORMIN (GLUCOPHAGE) 1000 MG tablet Take 1,000 mg by mouth daily with breakfast.    nortriptyline (PAMELOR) 25 MG capsule Take 1 capsule (25 mg total) by mouth every evening.    ONETOUCH DELICA LANCETS 33 gauge Misc TEST ONCE  D    ONETOUCH ULTRA BLUE TEST STRIP Strp TEST ONCE D BEFORE BREAKFAST    ONETOUCH ULTRAMINI kit UTD    pantoprazole (PROTONIX) 40 MG tablet Take 1 tablet (40 mg total) by mouth once daily.    pregabalin (LYRICA) 150 MG capsule Take 1 capsule (150 mg total) by mouth 3 (three) times daily.    tobramycin sulfate 0.3% (TOBREX) 0.3 % ophthalmic solution 1-2 drops topically twice daily to affected toe(s).    ketotifen (ZADITOR) 0.025 % (0.035 %) ophthalmic solution INSTILL ONE DROP IN AFFECTED EYE ONCE A DAY AS NEEDED FOR ITCHY EYES (Patient not taking: Reported on 6/9/2025)     No current facility-administered medications on file prior to visit.       Past Medical History:   Diagnosis Date    Anxiety     Asthma     DDD (degenerative disc disease), cervical     Diabetes mellitus     Hypertension     JOSUE on CPAP      Past Surgical History:   Procedure Laterality Date    ARTHROSCOPY OF KNEE Right 4/15/2021    Procedure: ARTHROSCOPY, KNEE LYSIS OF ADHESIONS;  Surgeon: Che Ruby MD;  Location: [facility] OR;  Service: Orthopedics;  Laterality: Right;    CHONDROPLASTY OF KNEE Right 4/15/2021    Procedure: CHONDROPLASTY, KNEE;  Surgeon: Che Ruby MD;  Location: [facility] OR;  Service: Orthopedics;  Laterality: Right;    EPIDURAL STEROID INJECTION N/A 10/23/2018    Procedure: Injection, Steroid, Epidural LUMBAR L4/5 TESS;  Surgeon: Ed Pina MD;  Location: [facility] PAIN T;  Service: Pain Management;  Laterality: N/A;    ESOPHAGOGASTRODUODENOSCOPY N/A 6/6/2025    Procedure: EGD (ESOPHAGOGASTRODUODENOSCOPY);  Surgeon: Sera Duarte MD;  Location: [facility] ENDO;  Service: Endoscopy;  Laterality: N/A;    HYSTERECTOMY      laparoscopic    KNEE ARTHROSCOPY W/ MENISCECTOMY Right 4/15/2021    Procedure: ARTHROSCOPY, KNEE, WITH MENISCECTOMY LATERAL, PLICA  EXCISION;  Surgeon: Che Ruby MD;  Location: [facility] OR;  Service: Orthopedics;  Laterality: Right;    LAPAROSCOPIC SLEEVE GASTRECTOMY N/A 5/13/2019    Procedure: GASTRECTOMY, SLEEVE,  LAPAROSCOPIC;  Surgeon: Jie Montanez MD;  Location: [facility] OR;  Service: General;  Laterality: N/A;    right knee surgery      SYNOVECTOMY OF KNEE Right 4/15/2021    Procedure: SYNOVECTOMY, KNEE;  Surgeon: Che Ruby MD;  Location: [facility] OR;  Service: Orthopedics;  Laterality: Right;    TRANSFORAMINAL EPIDURAL INJECTION OF STEROID Right 12/31/2024    Procedure: LUMBAR TRANSFORAMINAL RIGHT L3/4 AND L4/5;  Surgeon: Steff Thrasher MD;  Location: [facility] PAIN MGT;  Service: Pain Management;  Laterality: Right;  2 WK F/U EMILY  Contact Information  [phone]    TRANSFORAMINAL EPIDURAL INJECTION OF STEROID Right 2/6/2025    Procedure: CERVICAL TRANSFORAMINAL RIGHT C6/7 AND C7/T1 *ALE PT*;  Surgeon: Sherman Rahman MD;  Location: [facility] PAIN MGT;  Service: Pain Management;  Laterality: Right;  2 WK F/U SU     Family History   Problem Relation Name Age of Onset    Diabetes Father       Social History[1]       OBJECTIVE:     Vital Signs:  Pulse: 77 (06/09/25 1425)  BP: (!) 130/90 (06/09/25 1425)  SpO2: 98 % (06/09/25 1425)    Physical Exam  Constitutional:       General: She is not in acute distress.     Appearance: Normal appearance. She is not toxic-appearing.   HENT:      Head: Normocephalic and atraumatic.      Right Ear: External ear normal.      Left Ear: External ear normal.      Nose: Nose normal.      Mouth/Throat:      Mouth: Mucous membranes are moist.   Eyes:      Extraocular Movements: Extraocular movements intact.   Cardiovascular:      Rate and Rhythm: Normal rate and regular rhythm.      Pulses: Normal pulses.      Heart sounds: Normal heart sounds.   Pulmonary:      Effort: Pulmonary effort is normal. No respiratory distress.   Abdominal:      General: Abdomen is flat.      Palpations: Abdomen is soft.      Tenderness: There is no abdominal tenderness.   Musculoskeletal:      Cervical back: Normal range of motion and neck supple.   Skin:     General: Skin is warm.      Findings: No bruising or erythema.    Neurological:      General: No focal deficit present.      Mental Status: She is alert.   Psychiatric:         Mood and Affect: Mood normal.         Laboratory:  Hemoglobin A1C   Date Value Ref Range Status   12/02/2024 6.2 (H) 4.0 - 5.6 % Final     Comment:     ADA Screening Guidelines:  5.7-6.4%  Consistent with prediabetes  >or=6.5%  Consistent with diabetes    High levels of fetal hemoglobin interfere with the HbA1C  assay. Heterozygous hemoglobin variants (HbS, HgC, etc)do  not significantly interfere with this assay.   However, presence of multiple variants may affect accuracy.     01/20/2022 6.1 (H) 4.0 - 5.6 % Final     Comment:     ADA Screening Guidelines:  5.7-6.4%  Consistent with prediabetes  >or=6.5%  Consistent with diabetes    High levels of fetal hemoglobin interfere with the HbA1C  assay. Heterozygous hemoglobin variants (HbS, HgC, etc)do  not significantly interfere with this assay.   However, presence of multiple variants may affect accuracy.     11/07/2019 6.4 (H) 4.0 - 5.6 % Final     Comment:     ADA Screening Guidelines:  5.7-6.4%  Consistent with prediabetes  >or=6.5%  Consistent with diabetes  High levels of fetal hemoglobin interfere with the HbA1C  assay. Heterozygous hemoglobin variants (HbS, HgC, etc)do  not significantly interfere with this assay.   However, presence of multiple variants may affect accuracy.         A/P:    Juan Carlos was seen today for depression.    Diagnoses and all orders for this visit:    PTSD (post-traumatic stress disorder)  -     citalopram (CELEXA) 40 MG tablet; Take 1 tablet (40 mg total) by mouth once daily.    Assessed depression symptoms and response to current medication regimen.  Determined citalopram dosage increase may be beneficial to address persistent intrusive thoughts.  Recognized importance of combined pharmacological and therapeutic approach for managing depression.    Follow up in 4-6 weeks for mood check    This note was generated with the  assistance of ambient listening technology. Verbal consent was obtained by the patient and accompanying visitor(s) for the recording of patient appointment to facilitate this note. I attest to having reviewed and edited the generated note for accuracy, though some syntax or spelling errors may persist. Please contact the author of this note for any clarification.      Josiah Valdes MD   Family Medicine   [facility]           [1]   Social History  Tobacco Use    Smoking status: Never    Smokeless tobacco: Never   Substance Use Topics    Alcohol use: Yes     Comment: socially    Drug use: No

## 2025-07-17 ENCOUNTER — TELEPHONE (OUTPATIENT)
Dept: PAIN MEDICINE | Facility: CLINIC | Age: 50
End: 2025-07-17
Payer: MEDICARE

## 2025-07-17 ENCOUNTER — OFFICE VISIT (OUTPATIENT)
Dept: PAIN MEDICINE | Facility: CLINIC | Age: 50
End: 2025-07-17
Payer: MEDICARE

## 2025-07-17 VITALS
SYSTOLIC BLOOD PRESSURE: 125 MMHG | TEMPERATURE: 98 F | WEIGHT: 218.06 LBS | OXYGEN SATURATION: 100 % | HEIGHT: 66 IN | DIASTOLIC BLOOD PRESSURE: 88 MMHG | RESPIRATION RATE: 19 BRPM | HEART RATE: 67 BPM | BODY MASS INDEX: 35.04 KG/M2

## 2025-07-17 DIAGNOSIS — M50.30 DDD (DEGENERATIVE DISC DISEASE), CERVICAL: ICD-10-CM

## 2025-07-17 DIAGNOSIS — M54.12 CERVICAL RADICULOPATHY: Primary | ICD-10-CM

## 2025-07-17 DIAGNOSIS — M79.18 MYOFASCIAL PAIN: ICD-10-CM

## 2025-07-17 DIAGNOSIS — S71.131S GUN SHOT WOUND OF THIGH/FEMUR, RIGHT, SEQUELA: ICD-10-CM

## 2025-07-17 DIAGNOSIS — M47.812 CERVICAL SPONDYLOSIS: ICD-10-CM

## 2025-07-17 DIAGNOSIS — M79.2 NEUROPATHIC PAIN: ICD-10-CM

## 2025-07-17 PROCEDURE — 1159F MED LIST DOCD IN RCRD: CPT | Mod: CPTII,S$GLB,, | Performed by: NURSE PRACTITIONER

## 2025-07-17 PROCEDURE — 3079F DIAST BP 80-89 MM HG: CPT | Mod: CPTII,S$GLB,, | Performed by: NURSE PRACTITIONER

## 2025-07-17 PROCEDURE — 3008F BODY MASS INDEX DOCD: CPT | Mod: CPTII,S$GLB,, | Performed by: NURSE PRACTITIONER

## 2025-07-17 PROCEDURE — 1160F RVW MEDS BY RX/DR IN RCRD: CPT | Mod: CPTII,S$GLB,, | Performed by: NURSE PRACTITIONER

## 2025-07-17 PROCEDURE — 99214 OFFICE O/P EST MOD 30 MIN: CPT | Mod: S$GLB,,, | Performed by: NURSE PRACTITIONER

## 2025-07-17 PROCEDURE — 99999 PR PBB SHADOW E&M-EST. PATIENT-LVL V: CPT | Mod: PBBFAC,,, | Performed by: NURSE PRACTITIONER

## 2025-07-17 PROCEDURE — 3074F SYST BP LT 130 MM HG: CPT | Mod: CPTII,S$GLB,, | Performed by: NURSE PRACTITIONER

## 2025-07-17 RX ORDER — TIZANIDINE 4 MG/1
4 TABLET ORAL 3 TIMES DAILY PRN
Qty: 90 TABLET | Refills: 2 | Status: SHIPPED | OUTPATIENT
Start: 2025-07-17

## 2025-07-17 NOTE — TELEPHONE ENCOUNTER
Copied from CRM #[id number]. Topic: Appointments - Appointment Access  >> Jul 17, 2025  8:38 AM Summer wrote:  ype: Patient Call Back    Who called:pt     What is the request in detail:pt is running a little late please assist.    Can the clinic reply by [facility]?no    Would the patient rather a call back or a response via [facility]? Call     Best call back number:[phone]     Additional Information:

## 2025-07-17 NOTE — TELEPHONE ENCOUNTER
Staff attempted to contact patient to get ETA due to patient running late but phone repeatedly went to voicemail. Staff moved patient appointment time to 10:20am with Marla
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
## 2025-07-24 ENCOUNTER — OCCUPATIONAL HEALTH (OUTPATIENT)
Dept: URGENT CARE | Facility: CLINIC | Age: 50
End: 2025-07-24

## 2025-07-24 DIAGNOSIS — Z13.9 ENCOUNTER FOR SCREENING: Primary | ICD-10-CM

## 2025-07-24 LAB
CTP QC/QA: YES
POC 10 PANEL DRUG SCREEN: NEGATIVE

## 2025-07-28 ENCOUNTER — OFFICE VISIT (OUTPATIENT)
Dept: INTERNAL MEDICINE | Facility: CLINIC | Age: 50
End: 2025-07-28
Payer: MEDICARE

## 2025-07-28 VITALS
DIASTOLIC BLOOD PRESSURE: 88 MMHG | HEIGHT: 66 IN | WEIGHT: 218.06 LBS | BODY MASS INDEX: 35.04 KG/M2 | SYSTOLIC BLOOD PRESSURE: 125 MMHG

## 2025-07-28 DIAGNOSIS — R00.2 PALPITATIONS: Primary | ICD-10-CM

## 2025-07-28 DIAGNOSIS — K21.9 GASTROESOPHAGEAL REFLUX DISEASE, UNSPECIFIED WHETHER ESOPHAGITIS PRESENT: ICD-10-CM

## 2025-07-28 DIAGNOSIS — F43.10 PTSD (POST-TRAUMATIC STRESS DISORDER): ICD-10-CM

## 2025-07-28 PROCEDURE — 98005 SYNCH AUDIO-VIDEO EST LOW 20: CPT | Mod: 95,,,

## 2025-07-28 PROCEDURE — 3079F DIAST BP 80-89 MM HG: CPT | Mod: CPTII,95,,

## 2025-07-28 PROCEDURE — 1159F MED LIST DOCD IN RCRD: CPT | Mod: CPTII,95,,

## 2025-07-28 PROCEDURE — 3074F SYST BP LT 130 MM HG: CPT | Mod: CPTII,95,,

## 2025-07-28 PROCEDURE — 3008F BODY MASS INDEX DOCD: CPT | Mod: CPTII,95,,

## 2025-07-28 PROCEDURE — 1160F RVW MEDS BY RX/DR IN RCRD: CPT | Mod: CPTII,95,,

## 2025-07-28 RX ORDER — CITALOPRAM 40 MG/1
40 TABLET ORAL DAILY
Qty: 90 TABLET | Refills: 3 | Status: SHIPPED | OUTPATIENT
Start: 2025-07-28

## 2025-07-28 RX ORDER — PANTOPRAZOLE SODIUM 40 MG/1
40 TABLET, DELAYED RELEASE ORAL DAILY
Qty: 90 TABLET | Refills: 3 | Status: SHIPPED | OUTPATIENT
Start: 2025-07-28 | End: 2026-07-23

## 2025-07-28 NOTE — PROGRESS NOTES
Telehealth Visit  [facility]      The patient location is: parked car in [place]   The chief complaint leading to consultation is: med check/refill  Visit type: audiovisual  Face to Face time with patient: 15 minutes      HPI: Juan Carlos Turner is a 49 y.o. year old female who presents for med check    #MDD  #MDD/PTSD/SIDRA  She experiences racing thoughts and daydreaming, particularly during periods of inactivity when thoughts and memories become more intrusive. She has previously worked with two different therapists, noting that while therapy can be helpful, it does not always alleviate her symptoms. She has trialed citalopram, escitalopram, and duloxetine in the past. Currently taking citalopram but continues to experience intrusive and racing thoughts despite medication. Denies SI/HI. She was increased on her citalopram last visit. She would like refill of celexa today as it is helping her symptoms overall.     #chest tightness/pain  Patient states she is feeling intermittent symptoms of chest tightness and chest pain. She has history of GERD but is unsure if these are her prior GERD symptoms. She is interested in further testing today. She denies new SOB, palpitations, or pre-syncope, syncope.     Physical Exam:   Gen- NAD, appears stated age  Resp- Breathing comfortably on RA  Neuro- Alert, spontaneous movements  Psych- Calm, cooperative, logical thought process     Assessment and plan:      Juan Carlos was seen today for mdd.    Diagnoses and all orders for this visit:    Palpitations  -     EKG 12-lead; Future  -     Holter monitor - 48 hour; Future    Gastroesophageal reflux disease, unspecified whether esophagitis present  -     pantoprazole (PROTONIX) 40 MG tablet; Take 1 tablet (40 mg total) by mouth once daily.    PTSD (post-traumatic stress disorder)  -     citalopram (CELEXA) 40 MG tablet; Take 1 tablet (40 mg total) by mouth once daily.    Potentially manifestation of GERD but will  order EKG/holter to assess for other cardiac etiologies. Celexa and protonix refilled today       Follow-up: 1-2 months for follow up    30 minutes of total time spent on the encounter, which includes face to face time and non-face to face time preparing to see the patient (eg, review of tests), Obtaining and/or reviewing separately obtained history, Documenting clinical information in the electronic or other health record, Independently interpreting results (not separately reported) and communicating results to the patient/family/caregiver, or Care coordination (not separately reported).         This service was not originating from a related E/M service provided within the previous 7 days nor will  to an E/M service or procedure within the next 24 hours or my soonest available appointment.  Prevailing standard of care was able to be met in this audio-only visit.      Josiah Valdes MD  [facility]

## 2025-07-30 ENCOUNTER — TELEPHONE (OUTPATIENT)
Dept: URGENT CARE | Facility: CLINIC | Age: 50
End: 2025-07-30
Payer: MEDICARE

## 2025-07-31 ENCOUNTER — OCCUPATIONAL HEALTH (OUTPATIENT)
Dept: URGENT CARE | Facility: CLINIC | Age: 50
End: 2025-07-31

## 2025-07-31 DIAGNOSIS — Z23 ENCOUNTER FOR IMMUNIZATION: Primary | ICD-10-CM

## 2025-08-05 ENCOUNTER — TELEPHONE (OUTPATIENT)
Dept: INTERNAL MEDICINE | Facility: CLINIC | Age: 50
End: 2025-08-05
Payer: MEDICARE

## 2025-08-05 NOTE — TELEPHONE ENCOUNTER
Spoke to Cardiology dept and was told that she will give the patient a call to schedule holter monitor.

## 2025-08-05 NOTE — TELEPHONE ENCOUNTER
Copied from CRM #[id number]. Topic: General Inquiry - Patient Advice  >> Aug 5, 2025  3:29 PM Noris wrote:  Type: Patient Call Back    Who called: Patient     What is the request in detail: Pt is requesting a call back to discuss she was suppose to receive a call back to get set up to wear a heart monitor for 2 days. Please advise       Would the patient rather a call back or a response via [facility]?  Call     Best call back number: [phone]     Additional Information:

## 2025-08-07 ENCOUNTER — HOSPITAL ENCOUNTER (OUTPATIENT)
Facility: OTHER | Age: 50
Discharge: HOME | End: 2025-08-07
Attending: STUDENT IN AN ORGANIZED HEALTH CARE EDUCATION/TRAINING PROGRAM | Admitting: STUDENT IN AN ORGANIZED HEALTH CARE EDUCATION/TRAINING PROGRAM
Payer: MEDICARE

## 2025-08-07 VITALS
OXYGEN SATURATION: 98 % | TEMPERATURE: 99 F | DIASTOLIC BLOOD PRESSURE: 96 MMHG | BODY MASS INDEX: 33.75 KG/M2 | SYSTOLIC BLOOD PRESSURE: 132 MMHG | RESPIRATION RATE: 18 BRPM | HEIGHT: 66 IN | HEART RATE: 65 BPM | WEIGHT: 210 LBS

## 2025-08-07 DIAGNOSIS — M54.16 LUMBAR RADICULOPATHY: ICD-10-CM

## 2025-08-07 DIAGNOSIS — M54.12 CERVICAL RADICULOPATHY: Primary | ICD-10-CM

## 2025-08-07 LAB — POCT GLUCOSE: 124 MG/DL (ref 70–110)

## 2025-08-07 PROCEDURE — 99153 MOD SED SAME PHYS/QHP EA: CPT | Performed by: STUDENT IN AN ORGANIZED HEALTH CARE EDUCATION/TRAINING PROGRAM

## 2025-08-07 PROCEDURE — 64480 NJX AA&/STRD TFRM EPI C/T EA: CPT | Mod: RT | Performed by: STUDENT IN AN ORGANIZED HEALTH CARE EDUCATION/TRAINING PROGRAM

## 2025-08-07 PROCEDURE — 64480 NJX AA&/STRD TFRM EPI C/T EA: CPT | Mod: RT,,, | Performed by: STUDENT IN AN ORGANIZED HEALTH CARE EDUCATION/TRAINING PROGRAM

## 2025-08-07 PROCEDURE — 64479 NJX AA&/STRD TFRM EPI C/T 1: CPT | Mod: RT,,, | Performed by: STUDENT IN AN ORGANIZED HEALTH CARE EDUCATION/TRAINING PROGRAM

## 2025-08-07 PROCEDURE — 82962 GLUCOSE BLOOD TEST: CPT | Performed by: STUDENT IN AN ORGANIZED HEALTH CARE EDUCATION/TRAINING PROGRAM

## 2025-08-07 PROCEDURE — 99152 MOD SED SAME PHYS/QHP 5/>YRS: CPT | Mod: ,,, | Performed by: STUDENT IN AN ORGANIZED HEALTH CARE EDUCATION/TRAINING PROGRAM

## 2025-08-07 PROCEDURE — 64479 NJX AA&/STRD TFRM EPI C/T 1: CPT | Mod: RT | Performed by: STUDENT IN AN ORGANIZED HEALTH CARE EDUCATION/TRAINING PROGRAM

## 2025-08-07 PROCEDURE — 25500020 PHARM REV CODE 255: Performed by: STUDENT IN AN ORGANIZED HEALTH CARE EDUCATION/TRAINING PROGRAM

## 2025-08-07 PROCEDURE — 63600175 PHARM REV CODE 636 W HCPCS: Performed by: STUDENT IN AN ORGANIZED HEALTH CARE EDUCATION/TRAINING PROGRAM

## 2025-08-07 PROCEDURE — 99152 MOD SED SAME PHYS/QHP 5/>YRS: CPT | Performed by: STUDENT IN AN ORGANIZED HEALTH CARE EDUCATION/TRAINING PROGRAM

## 2025-08-07 RX ORDER — FENTANYL CITRATE 50 UG/ML
INJECTION, SOLUTION INTRAMUSCULAR; INTRAVENOUS
Status: DISCONTINUED | OUTPATIENT
Start: 2025-08-07 | End: 2025-08-07 | Stop reason: HOSPADM

## 2025-08-07 RX ORDER — DEXAMETHASONE SODIUM PHOSPHATE 10 MG/ML
INJECTION, SOLUTION INTRA-ARTICULAR; INTRALESIONAL; INTRAMUSCULAR; INTRAVENOUS; SOFT TISSUE
Status: DISCONTINUED | OUTPATIENT
Start: 2025-08-07 | End: 2025-08-07 | Stop reason: HOSPADM

## 2025-08-07 RX ORDER — LIDOCAINE HYDROCHLORIDE 20 MG/ML
INJECTION, SOLUTION EPIDURAL; INFILTRATION; INTRACAUDAL; PERINEURAL
Status: DISCONTINUED | OUTPATIENT
Start: 2025-08-07 | End: 2025-08-07 | Stop reason: HOSPADM

## 2025-08-07 RX ORDER — LIDOCAINE HYDROCHLORIDE 10 MG/ML
INJECTION, SOLUTION EPIDURAL; INFILTRATION; INTRACAUDAL; PERINEURAL
Status: DISCONTINUED | OUTPATIENT
Start: 2025-08-07 | End: 2025-08-07 | Stop reason: HOSPADM

## 2025-08-07 RX ORDER — MIDAZOLAM HYDROCHLORIDE 1 MG/ML
INJECTION INTRAMUSCULAR; INTRAVENOUS
Status: DISCONTINUED | OUTPATIENT
Start: 2025-08-07 | End: 2025-08-07 | Stop reason: HOSPADM

## 2025-08-07 RX ORDER — SODIUM CHLORIDE 9 MG/ML
INJECTION, SOLUTION INTRAVENOUS CONTINUOUS
Status: DISCONTINUED | OUTPATIENT
Start: 2025-08-07 | End: 2025-08-07 | Stop reason: HOSPADM

## 2025-08-14 DIAGNOSIS — F43.10 PTSD (POST-TRAUMATIC STRESS DISORDER): ICD-10-CM

## 2025-08-14 RX ORDER — HYDROXYZINE HYDROCHLORIDE 50 MG/1
TABLET, FILM COATED ORAL
Qty: 40 TABLET | Refills: 0 | Status: SHIPPED | OUTPATIENT
Start: 2025-08-14

## 2025-08-19 DIAGNOSIS — F43.10 PTSD (POST-TRAUMATIC STRESS DISORDER): ICD-10-CM

## 2025-08-19 RX ORDER — HYDROXYZINE HYDROCHLORIDE 50 MG/1
50 TABLET, FILM COATED ORAL 4 TIMES DAILY PRN
Qty: 40 TABLET | Refills: 0 | OUTPATIENT
Start: 2025-08-19

## 2025-08-22 ENCOUNTER — OFFICE VISIT (OUTPATIENT)
Dept: PAIN MEDICINE | Facility: CLINIC | Age: 50
End: 2025-08-22
Payer: MEDICARE

## 2025-08-22 VITALS
HEART RATE: 71 BPM | RESPIRATION RATE: 19 BRPM | BODY MASS INDEX: 34.72 KG/M2 | SYSTOLIC BLOOD PRESSURE: 106 MMHG | TEMPERATURE: 99 F | HEIGHT: 66 IN | OXYGEN SATURATION: 98 % | WEIGHT: 216.06 LBS | DIASTOLIC BLOOD PRESSURE: 72 MMHG

## 2025-08-22 DIAGNOSIS — M79.2 NEUROPATHIC PAIN: ICD-10-CM

## 2025-08-22 DIAGNOSIS — M51.362 DEGENERATION OF INTERVERTEBRAL DISC OF LUMBAR REGION WITH DISCOGENIC BACK PAIN AND LOWER EXTREMITY PAIN: ICD-10-CM

## 2025-08-22 DIAGNOSIS — M54.16 LUMBAR RADICULOPATHY: ICD-10-CM

## 2025-08-22 DIAGNOSIS — M54.12 CERVICAL RADICULOPATHY: ICD-10-CM

## 2025-08-22 DIAGNOSIS — M47.812 CERVICAL SPONDYLOSIS: ICD-10-CM

## 2025-08-22 DIAGNOSIS — S71.131S GUN SHOT WOUND OF THIGH/FEMUR, RIGHT, SEQUELA: ICD-10-CM

## 2025-08-22 DIAGNOSIS — M79.18 MYOFASCIAL PAIN: ICD-10-CM

## 2025-08-22 DIAGNOSIS — G89.4 CHRONIC PAIN DISORDER: Primary | ICD-10-CM

## 2025-08-22 PROCEDURE — 99999 PR PBB SHADOW E&M-EST. PATIENT-LVL IV: CPT | Mod: PBBFAC,,, | Performed by: NURSE PRACTITIONER

## 2025-09-02 ENCOUNTER — OCCUPATIONAL HEALTH (OUTPATIENT)
Dept: URGENT CARE | Facility: CLINIC | Age: 50
End: 2025-09-02

## 2025-09-02 DIAGNOSIS — Z13.9 ENCOUNTER FOR SCREENING: Primary | ICD-10-CM

## (undated) DEVICE — GOWN SMART IMP BREATHABLE XXLG

## (undated) DEVICE — APPLIER CLIP EPIX UNIV 5X34

## (undated) DEVICE — IRRIGATOR SUCTION W/TIP

## (undated) DEVICE — UNDERGLOVES BIOGEL PI SIZE 7.5

## (undated) DEVICE — DRAPE ABDOMINAL TIBURON 14X11

## (undated) DEVICE — SOL IRR NACL .9% 3000ML

## (undated) DEVICE — GLOVE SURGEON SYN PF SZ 9

## (undated) DEVICE — SUT MCRYL PLUS 4-0 PS2 27IN

## (undated) DEVICE — SYR B-D DISP CONTROL 10CC100/C

## (undated) DEVICE — SOL 9P NACL IRR PIC IL

## (undated) DEVICE — KIT PROCEDURE STER INLET CLOSU

## (undated) DEVICE — SEE MEDLINE ITEM 146292

## (undated) DEVICE — GLOVE BIOGEL SKINSENSE PI 7.0

## (undated) DEVICE — PAD COLD THERAPY KNEE WRAP ON

## (undated) DEVICE — SEE MEDLINE ITEM 157150

## (undated) DEVICE — SEE MEDLINE ITEM 157117

## (undated) DEVICE — ADHESIVE MASTISOL VIAL 48/BX

## (undated) DEVICE — NDL SPINAL 18GX3.5 SPINOCAN

## (undated) DEVICE — SYR 50CC LL

## (undated) DEVICE — BLADE SURG CARBON STEEL SZ11

## (undated) DEVICE — TUBING PNEUMO

## (undated) DEVICE — SEE MEDLINE ITEM 146313

## (undated) DEVICE — BANDAGE ADHESIVE

## (undated) DEVICE — TROCAR KII FIOS 12MM X 100MM

## (undated) DEVICE — SEE MEDLINE ITEM 152680

## (undated) DEVICE — RELOAD ECHELON FLEX BLU 60MM

## (undated) DEVICE — TROCAR KII FIOS 5MM X 100MM

## (undated) DEVICE — SUT MONOCRYL 4-0 SH UND MON

## (undated) DEVICE — BLADE 4.2MM PREBENT ULTRACUT

## (undated) DEVICE — GLOVE BIOGEL SKINSENSE PI 6.5

## (undated) DEVICE — ELECTRODE REM PLYHSV RETURN 9

## (undated) DEVICE — PACK CUSTOM UNIV BASIN SLI

## (undated) DEVICE — PROBE ARTHO ENERGY 90 DEG

## (undated) DEVICE — PUMP COLD THERAPY

## (undated) DEVICE — TRANSFER MATTRES LATERAL 34

## (undated) DEVICE — SHEARS HARMONIC 36CM HD 1000I

## (undated) DEVICE — STAPLER ECHELON FLEX 60MM 44CM

## (undated) DEVICE — SCISSOR 5MMX35CM DIRECT DRIVE

## (undated) DEVICE — APPLICATOR CHLORAPREP ORN 26ML

## (undated) DEVICE — SOL CLEARIFY VISUALIZATION LAP

## (undated) DEVICE — SEE MEDLINE ITEM 152622

## (undated) DEVICE — SUT 0 VICRYL / UR6 (J603)

## (undated) DEVICE — NDL HYPO REG 25G X 1 1/2

## (undated) DEVICE — Device

## (undated) DEVICE — PAD CAST SPECIALIST STRL 6

## (undated) DEVICE — CLOSURE SKIN STERI STRIP 1/2X4

## (undated) DEVICE — GLOVE ORTHO PF SZ 8.5

## (undated) DEVICE — LINER SUCTION 3000CC

## (undated) DEVICE — SEE MEDLINE ITEM 157169

## (undated) DEVICE — PAD ELECTRODE STER 1.5X3

## (undated) DEVICE — GAUZE SPONGE 4X4 12PLY

## (undated) DEVICE — PAD ABD 8X10 STERILE

## (undated) DEVICE — RELOAD ECHELON FLEX GRN 60MM

## (undated) DEVICE — TUBE SET INFLOW/OUTFLOW

## (undated) DEVICE — SYR SLIP TIP 10ML SHIELD

## (undated) DEVICE — SLEEVE SCD EXPRESS CALF MEDIUM

## (undated) DEVICE — GLOVE PROTEXIS PI CLASSIC 7.5

## (undated) DEVICE — DRESSING XEROFORM FOIL PK 1X8

## (undated) DEVICE — COVER MAYO STAND REINFRCD 30

## (undated) DEVICE — STRAP OR TABLE 5IN X 72IN

## (undated) DEVICE — GOWN SMARTGOWN LVL4 X-LONG XL